# Patient Record
Sex: FEMALE | NOT HISPANIC OR LATINO | URBAN - METROPOLITAN AREA
[De-identification: names, ages, dates, MRNs, and addresses within clinical notes are randomized per-mention and may not be internally consistent; named-entity substitution may affect disease eponyms.]

---

## 2017-05-20 ENCOUNTER — INPATIENT (INPATIENT)
Facility: HOSPITAL | Age: 82
LOS: 14 days | Discharge: NURSING, REHAB OR LONG-TERM CARE | DRG: 871 | End: 2017-06-04
Attending: STUDENT IN AN ORGANIZED HEALTH CARE EDUCATION/TRAINING PROGRAM | Admitting: STUDENT IN AN ORGANIZED HEALTH CARE EDUCATION/TRAINING PROGRAM
Payer: MEDICARE

## 2017-05-20 VITALS
HEIGHT: 60 IN | TEMPERATURE: 98 F | SYSTOLIC BLOOD PRESSURE: 121 MMHG | OXYGEN SATURATION: 97 % | HEART RATE: 66 BPM | DIASTOLIC BLOOD PRESSURE: 57 MMHG | RESPIRATION RATE: 18 BRPM | WEIGHT: 111.99 LBS

## 2017-05-20 DIAGNOSIS — E78.00 PURE HYPERCHOLESTEROLEMIA, UNSPECIFIED: ICD-10-CM

## 2017-05-20 DIAGNOSIS — I10 ESSENTIAL (PRIMARY) HYPERTENSION: ICD-10-CM

## 2017-05-20 DIAGNOSIS — Z95.0 PRESENCE OF CARDIAC PACEMAKER: ICD-10-CM

## 2017-05-20 DIAGNOSIS — K85.90 ACUTE PANCREATITIS WITHOUT NECROSIS OR INFECTION, UNSPECIFIED: ICD-10-CM

## 2017-05-20 LAB
ALBUMIN SERPL ELPH-MCNC: 2.9 G/DL — LOW (ref 3.3–5.2)
ALBUMIN SERPL ELPH-MCNC: 3.8 G/DL — SIGNIFICANT CHANGE UP (ref 3.3–5.2)
ALP SERPL-CCNC: 144 U/L — HIGH (ref 40–120)
ALP SERPL-CCNC: 156 U/L — HIGH (ref 40–120)
ALT FLD-CCNC: 36 U/L — HIGH
ALT FLD-CCNC: 36 U/L — HIGH
ANION GAP SERPL CALC-SCNC: 15 MMOL/L — SIGNIFICANT CHANGE UP (ref 5–17)
ANION GAP SERPL CALC-SCNC: 17 MMOL/L — SIGNIFICANT CHANGE UP (ref 5–17)
APPEARANCE UR: ABNORMAL
APTT BLD: 35.5 SEC — SIGNIFICANT CHANGE UP (ref 27.5–37.4)
AST SERPL-CCNC: 110 U/L — HIGH
AST SERPL-CCNC: 113 U/L — HIGH
BACTERIA # UR AUTO: ABNORMAL
BASE EXCESS BLDV CALC-SCNC: -3 MMOL/L — SIGNIFICANT CHANGE UP (ref -3–3)
BASOPHILS # BLD AUTO: 0 K/UL — SIGNIFICANT CHANGE UP (ref 0–0.2)
BASOPHILS NFR BLD AUTO: 0.2 % — SIGNIFICANT CHANGE UP (ref 0–2)
BILIRUB DIRECT SERPL-MCNC: 3.3 MG/DL — HIGH (ref 0–0.3)
BILIRUB INDIRECT FLD-MCNC: 0.4 MG/DL — SIGNIFICANT CHANGE UP (ref 0.2–1)
BILIRUB SERPL-MCNC: 2.8 MG/DL — HIGH (ref 0.4–2)
BILIRUB SERPL-MCNC: 3.7 MG/DL — HIGH (ref 0.4–2)
BILIRUB UR-MCNC: NEGATIVE — SIGNIFICANT CHANGE UP
BUN SERPL-MCNC: 38 MG/DL — HIGH (ref 8–20)
BUN SERPL-MCNC: 41 MG/DL — HIGH (ref 8–20)
CALCIUM SERPL-MCNC: 7.7 MG/DL — LOW (ref 8.6–10.2)
CALCIUM SERPL-MCNC: 9.7 MG/DL — SIGNIFICANT CHANGE UP (ref 8.6–10.2)
CHLORIDE SERPL-SCNC: 100 MMOL/L — SIGNIFICANT CHANGE UP (ref 98–107)
CHLORIDE SERPL-SCNC: 102 MMOL/L — SIGNIFICANT CHANGE UP (ref 98–107)
CK SERPL-CCNC: 27 U/L — SIGNIFICANT CHANGE UP (ref 25–170)
CO2 SERPL-SCNC: 23 MMOL/L — SIGNIFICANT CHANGE UP (ref 22–29)
CO2 SERPL-SCNC: 27 MMOL/L — SIGNIFICANT CHANGE UP (ref 22–29)
COLOR SPEC: ABNORMAL
CREAT SERPL-MCNC: 1.09 MG/DL — SIGNIFICANT CHANGE UP (ref 0.5–1.3)
CREAT SERPL-MCNC: 1.47 MG/DL — HIGH (ref 0.5–1.3)
DIFF PNL FLD: ABNORMAL
EOSINOPHIL # BLD AUTO: 0.2 K/UL — SIGNIFICANT CHANGE UP (ref 0–0.5)
EOSINOPHIL NFR BLD AUTO: 1.2 % — SIGNIFICANT CHANGE UP (ref 0–6)
EPI CELLS # UR: SIGNIFICANT CHANGE UP
GAS PNL BLDA: SIGNIFICANT CHANGE UP
GAS PNL BLDV: SIGNIFICANT CHANGE UP
GLUCOSE SERPL-MCNC: 155 MG/DL — HIGH (ref 70–115)
GLUCOSE SERPL-MCNC: 72 MG/DL — SIGNIFICANT CHANGE UP (ref 70–115)
GLUCOSE UR QL: NEGATIVE MG/DL — SIGNIFICANT CHANGE UP
HCO3 BLDV-SCNC: 21 MMOL/L — SIGNIFICANT CHANGE UP (ref 20–26)
HCT VFR BLD CALC: 28 % — LOW (ref 37–47)
HCT VFR BLD CALC: 35.7 % — LOW (ref 37–47)
HGB BLD-MCNC: 11.9 G/DL — LOW (ref 12–16)
HGB BLD-MCNC: 9.4 G/DL — LOW (ref 12–16)
INR BLD: 1.41 RATIO — HIGH (ref 0.88–1.16)
KETONES UR-MCNC: NEGATIVE — SIGNIFICANT CHANGE UP
LACTATE BLDV-MCNC: 2.1 MMOL/L — HIGH (ref 0.7–2)
LACTATE BLDV-MCNC: 5.9 MMOL/L — CRITICAL HIGH (ref 0.7–2)
LEUKOCYTE ESTERASE UR-ACNC: ABNORMAL
LIDOCAIN IGE QN: >3000 U/L — HIGH (ref 22–51)
LYMPHOCYTES # BLD AUTO: 1.9 K/UL — SIGNIFICANT CHANGE UP (ref 1–4.8)
LYMPHOCYTES # BLD AUTO: 11.7 % — LOW (ref 20–55)
MAGNESIUM SERPL-MCNC: 1.6 MG/DL — SIGNIFICANT CHANGE UP (ref 1.6–2.6)
MCHC RBC-ENTMCNC: 33.1 PG — HIGH (ref 27–31)
MCHC RBC-ENTMCNC: 33.2 PG — HIGH (ref 27–31)
MCHC RBC-ENTMCNC: 33.3 G/DL — SIGNIFICANT CHANGE UP (ref 32–36)
MCHC RBC-ENTMCNC: 33.6 G/DL — SIGNIFICANT CHANGE UP (ref 32–36)
MCV RBC AUTO: 98.9 FL — SIGNIFICANT CHANGE UP (ref 81–99)
MCV RBC AUTO: 99.2 FL — HIGH (ref 81–99)
MONOCYTES # BLD AUTO: 0.4 K/UL — SIGNIFICANT CHANGE UP (ref 0–0.8)
MONOCYTES NFR BLD AUTO: 2.8 % — LOW (ref 3–10)
NEUTROPHILS # BLD AUTO: 13.7 K/UL — HIGH (ref 1.8–8)
NEUTROPHILS NFR BLD AUTO: 83.6 % — HIGH (ref 37–73)
NITRITE UR-MCNC: NEGATIVE — SIGNIFICANT CHANGE UP
PCO2 BLDV: 46 MMHG — SIGNIFICANT CHANGE UP (ref 35–50)
PH BLDV: 7.31 — LOW (ref 7.35–7.45)
PH UR: 7 — SIGNIFICANT CHANGE UP (ref 5–8)
PHOSPHATE SERPL-MCNC: 2.4 MG/DL — SIGNIFICANT CHANGE UP (ref 2.4–4.7)
PLATELET # BLD AUTO: 288 K/UL — SIGNIFICANT CHANGE UP (ref 150–400)
PLATELET # BLD AUTO: 437 K/UL — HIGH (ref 150–400)
PO2 BLDV: 36 MMHG — SIGNIFICANT CHANGE UP (ref 25–45)
POTASSIUM SERPL-MCNC: 4 MMOL/L — SIGNIFICANT CHANGE UP (ref 3.5–5.3)
POTASSIUM SERPL-MCNC: 4.6 MMOL/L — SIGNIFICANT CHANGE UP (ref 3.5–5.3)
POTASSIUM SERPL-SCNC: 4 MMOL/L — SIGNIFICANT CHANGE UP (ref 3.5–5.3)
POTASSIUM SERPL-SCNC: 4.6 MMOL/L — SIGNIFICANT CHANGE UP (ref 3.5–5.3)
PROT SERPL-MCNC: 5.4 G/DL — LOW (ref 6.6–8.7)
PROT SERPL-MCNC: 7.1 G/DL — SIGNIFICANT CHANGE UP (ref 6.6–8.7)
PROT UR-MCNC: 30 MG/DL
PROTHROM AB SERPL-ACNC: 15.6 SEC — HIGH (ref 9.8–12.7)
RBC # BLD: 2.83 M/UL — LOW (ref 4.4–5.2)
RBC # BLD: 3.6 M/UL — LOW (ref 4.4–5.2)
RBC # FLD: 16.3 % — HIGH (ref 11–15.6)
RBC # FLD: 16.5 % — HIGH (ref 11–15.6)
SAO2 % BLDV: 64 % — LOW (ref 67–88)
SODIUM SERPL-SCNC: 140 MMOL/L — SIGNIFICANT CHANGE UP (ref 135–145)
SODIUM SERPL-SCNC: 144 MMOL/L — SIGNIFICANT CHANGE UP (ref 135–145)
SP GR SPEC: 1 — LOW (ref 1.01–1.02)
TROPONIN T SERPL-MCNC: 0.03 NG/ML — SIGNIFICANT CHANGE UP (ref 0–0.06)
UROBILINOGEN FLD QL: 12 MG/DL
WBC # BLD: 16.3 K/UL — HIGH (ref 4.8–10.8)
WBC # BLD: 39.5 K/UL — HIGH (ref 4.8–10.8)
WBC # FLD AUTO: 16.3 K/UL — HIGH (ref 4.8–10.8)
WBC # FLD AUTO: 39.5 K/UL — HIGH (ref 4.8–10.8)
WBC UR QL: ABNORMAL

## 2017-05-20 PROCEDURE — 36556 INSERT NON-TUNNEL CV CATH: CPT

## 2017-05-20 PROCEDURE — 71010: CPT | Mod: 26

## 2017-05-20 PROCEDURE — 74177 CT ABD & PELVIS W/CONTRAST: CPT | Mod: 26

## 2017-05-20 PROCEDURE — 76937 US GUIDE VASCULAR ACCESS: CPT | Mod: 26

## 2017-05-20 PROCEDURE — 99291 CRITICAL CARE FIRST HOUR: CPT

## 2017-05-20 PROCEDURE — 70450 CT HEAD/BRAIN W/O DYE: CPT | Mod: 26

## 2017-05-20 PROCEDURE — 93010 ELECTROCARDIOGRAM REPORT: CPT

## 2017-05-20 PROCEDURE — 36620 INSERTION CATHETER ARTERY: CPT

## 2017-05-20 PROCEDURE — 72125 CT NECK SPINE W/O DYE: CPT | Mod: 26

## 2017-05-20 PROCEDURE — 76705 ECHO EXAM OF ABDOMEN: CPT | Mod: 26

## 2017-05-20 RX ORDER — MAGNESIUM SULFATE 500 MG/ML
2 VIAL (ML) INJECTION ONCE
Qty: 0 | Refills: 0 | Status: COMPLETED | OUTPATIENT
Start: 2017-05-20 | End: 2017-05-20

## 2017-05-20 RX ORDER — SODIUM CHLORIDE 9 MG/ML
1000 INJECTION, SOLUTION INTRAVENOUS ONCE
Qty: 0 | Refills: 0 | Status: COMPLETED | OUTPATIENT
Start: 2017-05-20 | End: 2017-05-20

## 2017-05-20 RX ORDER — SODIUM CHLORIDE 9 MG/ML
3 INJECTION INTRAMUSCULAR; INTRAVENOUS; SUBCUTANEOUS ONCE
Qty: 0 | Refills: 0 | Status: COMPLETED | OUTPATIENT
Start: 2017-05-20 | End: 2017-05-20

## 2017-05-20 RX ORDER — MORPHINE SULFATE 50 MG/1
2 CAPSULE, EXTENDED RELEASE ORAL ONCE
Qty: 0 | Refills: 0 | Status: DISCONTINUED | OUTPATIENT
Start: 2017-05-20 | End: 2017-05-20

## 2017-05-20 RX ORDER — ERTAPENEM SODIUM 1 G/1
500 INJECTION, POWDER, LYOPHILIZED, FOR SOLUTION INTRAMUSCULAR; INTRAVENOUS ONCE
Qty: 0 | Refills: 0 | Status: COMPLETED | OUTPATIENT
Start: 2017-05-20 | End: 2017-05-20

## 2017-05-20 RX ORDER — NOREPINEPHRINE BITARTRATE/D5W 8 MG/250ML
0.05 PLASTIC BAG, INJECTION (ML) INTRAVENOUS
Qty: 8 | Refills: 0 | Status: DISCONTINUED | OUTPATIENT
Start: 2017-05-20 | End: 2017-05-21

## 2017-05-20 RX ORDER — SODIUM CHLORIDE 9 MG/ML
250 INJECTION INTRAMUSCULAR; INTRAVENOUS; SUBCUTANEOUS ONCE
Qty: 0 | Refills: 0 | Status: COMPLETED | OUTPATIENT
Start: 2017-05-20 | End: 2017-05-20

## 2017-05-20 RX ORDER — HYDROMORPHONE HYDROCHLORIDE 2 MG/ML
0.5 INJECTION INTRAMUSCULAR; INTRAVENOUS; SUBCUTANEOUS EVERY 4 HOURS
Qty: 0 | Refills: 0 | Status: DISCONTINUED | OUTPATIENT
Start: 2017-05-20 | End: 2017-05-25

## 2017-05-20 RX ORDER — ACETAMINOPHEN 500 MG
1000 TABLET ORAL EVERY 8 HOURS
Qty: 0 | Refills: 0 | Status: COMPLETED | OUTPATIENT
Start: 2017-05-20 | End: 2017-05-20

## 2017-05-20 RX ORDER — SODIUM CHLORIDE 9 MG/ML
500 INJECTION, SOLUTION INTRAVENOUS ONCE
Qty: 0 | Refills: 0 | Status: COMPLETED | OUTPATIENT
Start: 2017-05-20 | End: 2017-05-20

## 2017-05-20 RX ORDER — VASOPRESSIN 20 [USP'U]/ML
0.04 INJECTION INTRAVENOUS
Qty: 100 | Refills: 0 | Status: DISCONTINUED | OUTPATIENT
Start: 2017-05-20 | End: 2017-05-21

## 2017-05-20 RX ORDER — ERTAPENEM SODIUM 1 G/1
INJECTION, POWDER, LYOPHILIZED, FOR SOLUTION INTRAMUSCULAR; INTRAVENOUS
Qty: 0 | Refills: 0 | Status: DISCONTINUED | OUTPATIENT
Start: 2017-05-20 | End: 2017-06-03

## 2017-05-20 RX ORDER — ERTAPENEM SODIUM 1 G/1
500 INJECTION, POWDER, LYOPHILIZED, FOR SOLUTION INTRAMUSCULAR; INTRAVENOUS EVERY 24 HOURS
Qty: 0 | Refills: 0 | Status: DISCONTINUED | OUTPATIENT
Start: 2017-05-21 | End: 2017-06-03

## 2017-05-20 RX ORDER — ONDANSETRON 8 MG/1
4 TABLET, FILM COATED ORAL ONCE
Qty: 0 | Refills: 0 | Status: COMPLETED | OUTPATIENT
Start: 2017-05-20 | End: 2017-05-20

## 2017-05-20 RX ORDER — CIPROFLOXACIN LACTATE 400MG/40ML
400 VIAL (ML) INTRAVENOUS ONCE
Qty: 0 | Refills: 0 | Status: DISCONTINUED | OUTPATIENT
Start: 2017-05-20 | End: 2017-05-20

## 2017-05-20 RX ORDER — ERTAPENEM SODIUM 1 G/1
INJECTION, POWDER, LYOPHILIZED, FOR SOLUTION INTRAMUSCULAR; INTRAVENOUS
Qty: 0 | Refills: 0 | Status: DISCONTINUED | OUTPATIENT
Start: 2017-05-20 | End: 2017-05-20

## 2017-05-20 RX ORDER — SODIUM CHLORIDE 9 MG/ML
1000 INJECTION, SOLUTION INTRAVENOUS
Qty: 0 | Refills: 0 | Status: DISCONTINUED | OUTPATIENT
Start: 2017-05-20 | End: 2017-05-22

## 2017-05-20 RX ORDER — HEPARIN SODIUM 5000 [USP'U]/ML
5000 INJECTION INTRAVENOUS; SUBCUTANEOUS EVERY 8 HOURS
Qty: 0 | Refills: 0 | Status: DISCONTINUED | OUTPATIENT
Start: 2017-05-20 | End: 2017-06-04

## 2017-05-20 RX ORDER — METRONIDAZOLE 500 MG
500 TABLET ORAL ONCE
Qty: 0 | Refills: 0 | Status: DISCONTINUED | OUTPATIENT
Start: 2017-05-20 | End: 2017-05-20

## 2017-05-20 RX ADMIN — SODIUM CHLORIDE 3000 MILLILITER(S): 9 INJECTION, SOLUTION INTRAVENOUS at 19:28

## 2017-05-20 RX ADMIN — SODIUM CHLORIDE 250 MILLILITER(S): 9 INJECTION INTRAMUSCULAR; INTRAVENOUS; SUBCUTANEOUS at 07:14

## 2017-05-20 RX ADMIN — SODIUM CHLORIDE 3 MILLILITER(S): 9 INJECTION INTRAMUSCULAR; INTRAVENOUS; SUBCUTANEOUS at 07:04

## 2017-05-20 RX ADMIN — Medication 50 GRAM(S): at 20:20

## 2017-05-20 RX ADMIN — Medication 4.76 MICROGRAM(S)/KG/MIN: at 17:17

## 2017-05-20 RX ADMIN — HEPARIN SODIUM 5000 UNIT(S): 5000 INJECTION INTRAVENOUS; SUBCUTANEOUS at 14:58

## 2017-05-20 RX ADMIN — SODIUM CHLORIDE 1000 MILLILITER(S): 9 INJECTION, SOLUTION INTRAVENOUS at 11:00

## 2017-05-20 RX ADMIN — Medication 400 MILLIGRAM(S): at 21:00

## 2017-05-20 RX ADMIN — HEPARIN SODIUM 5000 UNIT(S): 5000 INJECTION INTRAVENOUS; SUBCUTANEOUS at 22:09

## 2017-05-20 RX ADMIN — SODIUM CHLORIDE 75 MILLILITER(S): 9 INJECTION, SOLUTION INTRAVENOUS at 14:49

## 2017-05-20 RX ADMIN — MORPHINE SULFATE 2 MILLIGRAM(S): 50 CAPSULE, EXTENDED RELEASE ORAL at 07:14

## 2017-05-20 RX ADMIN — ERTAPENEM SODIUM 100 MILLIGRAM(S): 1 INJECTION, POWDER, LYOPHILIZED, FOR SOLUTION INTRAMUSCULAR; INTRAVENOUS at 18:52

## 2017-05-20 RX ADMIN — SODIUM CHLORIDE 3000 MILLILITER(S): 9 INJECTION, SOLUTION INTRAVENOUS at 20:35

## 2017-05-20 RX ADMIN — ONDANSETRON 4 MILLIGRAM(S): 8 TABLET, FILM COATED ORAL at 07:05

## 2017-05-20 RX ADMIN — Medication 50 GRAM(S): at 17:17

## 2017-05-20 RX ADMIN — Medication 100 MILLIGRAM(S): at 22:08

## 2017-05-20 RX ADMIN — SODIUM CHLORIDE 500 MILLILITER(S): 9 INJECTION, SOLUTION INTRAVENOUS at 17:13

## 2017-05-20 RX ADMIN — MORPHINE SULFATE 2 MILLIGRAM(S): 50 CAPSULE, EXTENDED RELEASE ORAL at 07:29

## 2017-05-20 RX ADMIN — Medication 1000 MILLIGRAM(S): at 21:30

## 2017-05-20 RX ADMIN — VASOPRESSIN 2.4 UNIT(S)/MIN: 20 INJECTION INTRAVENOUS at 21:40

## 2017-05-20 NOTE — ED PROVIDER NOTE - MEDICAL DECISION MAKING DETAILS
abdominal pain after fall unclear when started will tx for poss infection and w/u and surgery called for consult

## 2017-05-20 NOTE — ED PROVIDER NOTE - PROGRESS NOTE DETAILS
charge nurse Magdalena birmingham aware to assign RN to pt Lipase over 3000 surgery called and aware and rn aware o put pt on monitor charge nurse Magdalena birmingham aware to assign RN to pt surgery in ed at 745 and examined pt will follow and reeval post tests Lipase over 3000 surgery called and aware and rn aware on put pt on monitor and give contrast Icu Yifan Figueroa called and case discussedna [facility] see ct and surg eval and if they don't take pt she will eval for icu Dr Garcia in ed and will admit to sicu and direct further care Dr Garcia in ed and will admit to Sicu and direct further care

## 2017-05-20 NOTE — H&P ADULT - HISTORY OF PRESENT ILLNESS
Pt presents to [facility] ER s/p fall at home.  She was unable to get up and utilized her life alert. Daughter came to the patient's aid and brought her to [facility]. She admits to a history of abdominal pain but cannot recall when it began. Offers no other complaints.

## 2017-05-20 NOTE — H&P ADULT - ATTENDING COMMENTS
admit to sicu.   needs central venous access , and arterial line  will need vasopressor support  start Invanz  needs blood cx, urine cx. and will need bilary decompression

## 2017-05-20 NOTE — ED PROVIDER NOTE - CARE PLAN
Principal Discharge DX:	Abdominal pain Principal Discharge DX:	Pancreatitis  Secondary Diagnosis:	Abdominal pain

## 2017-05-20 NOTE — CONSULT NOTE ADULT - ASSESSMENT
89 year old female s/p mechanical fall from standing while using walker, no LOC, no head trauma, complaining of back pain, no external signs of trauma, no evidence of acute abdomen

## 2017-05-20 NOTE — PROCEDURE NOTE - NSPROCDETAILS_GEN_ALL_CORE
sterile dressing applied/lumen(s) aspirated and flushed/sterile technique, catheter placed/guidewire recovered

## 2017-05-20 NOTE — ED ADULT TRIAGE NOTE - CHIEF COMPLAINT QUOTE
pt biba after falling on grass in yard this AM, found sitting by daughter. pt denies injury, denies hitting head. on ASA 81mg. c/o abd pains at present time.

## 2017-05-20 NOTE — CONSULT NOTE ADULT - PROBLEM SELECTOR RECOMMENDATION 9
-no acute surgical intervention indicated at this time  -surgery team will follow up labs, imaging, and any further workup  -call ACS with questions or concerns  -full note to follow - no trauma injuries identified on exam or imaging. subsequent ACS issues noted, biliary - pancreatitis found will admit to sicu

## 2017-05-20 NOTE — CONSULT NOTE ADULT - SUBJECTIVE AND OBJECTIVE BOX
HPI: Patient is a 89 year old female, found down by her daughter at 515am, after pt went outside for her morning walk, pt used her life alert system. Pt found sitting up, in grass. Pt states she remembers the entire event, and did not hit her head. Pt states her walker malfunctioned and she fell onto the grass. Pt complaining of back pain and chills. Pt denies abdominal pain, nausea, vomiting, fever, chest pain or shortness of breath.    PMH:  CVA (cerebral infarction)    High cholesterol    Hypertension    Pacemaker    PSH:  Pacemaker insertion    SH: denies toxic habits, lives with daughter, uses walker    Meds:  Vascepa 2g BID  Synthroid 125mcg daily  Toprol XL 50mg BID  Fenofibrate 200mg daily  Vitamin D  sertraline 50mg daily  bumetanide 1mg daily  dexilant 60mg daily  Mag ox 500mg daily    Allergies: penicillin    Primary survey intact      Vital Signs Last 24 Hrs  T(C): 36.8, Max: 36.8 (05-20 @ 06:20)  T(F): 98.2, Max: 98.2 (05-20 @ 06:20)  HR: 66 (66 - 66)  BP: 121/57 (121/57 - 121/57)  BP(mean): --  RR: 18 (18 - 18)  SpO2: 97% (97% - 97%)    PE  Gen: NAD, shivering   HEENT: EOMI, head atruamatic  Neck: no cervical tenderness  Chest: no chest wall tenderness, no crepitus  Pulm: CTAB  CV: RRR  Abd: soft, nontender, nondistended  Back: no step off, no spinal tenderness  : no blood in perineum  Ext: moving all extremities  Vasc: 2+ peripheral pulses  Neuro: GCS 15, nonfocal      I&O's Detail      LABS:      RADIOLOGY & ADDITIONAL STUDIES:

## 2017-05-20 NOTE — H&P ADULT - PROBLEM SELECTOR PLAN 1
Admit to ACS service, SICU  Triglycerides and RUQ US pending  Scott for strict UO  Baseline ABG and EKG ordered  Fluid bolus ordered, will repeat lactate 4 hours after result

## 2017-05-20 NOTE — H&P ADULT - NSHPPHYSICALEXAM_GEN_ALL_CORE
General: NAD, resting comfortably  HEENT: PERRL, EOMI, no evidence of trauma  Neck: No JVD, trachea midline, FROM without pain  Chest: NTTP, CTA b/l  Abd: Soft, tender to deep palpation in the upper abdomen, non-distended, no skin changes  Extremities: No point tenderness, FROM without pain  Neuro: without gross deficit

## 2017-05-20 NOTE — ED ADULT NURSE REASSESSMENT NOTE - NS ED NURSE REASSESS COMMENT FT1
pt much more awake and alert speaking with family iv infusing well no redness or swelling present. ridley cath patent and draining a small amount of dark marcelo urine. pt denies any pain at this time.

## 2017-05-20 NOTE — PATIENT PROFILE ADULT. - ABILITY TO HEAR (WITH HEARING AID OR HEARING APPLIANCE IF NORMALLY USED):
Mildly to Moderately Impaired: difficulty hearing in some environments or speaker may need to increase volume or speak distinctly/has hearing aids in left ear

## 2017-05-20 NOTE — ED ADULT NURSE NOTE - OBJECTIVE STATEMENT
found outside this am on ground, sitting, patient complaining of back abdominal pain, nausea denies vomiting unknown LOC or hitting head

## 2017-05-20 NOTE — H&P ADULT - ASSESSMENT
89F with acute on chronic pancreatitis, likely biliary in nature 89F with acute on pancreatitis, likely biliary in nature

## 2017-05-20 NOTE — ED PROVIDER NOTE - CRITICAL CARE PROVIDED
direct patient care (not related to procedure)/documentation/consultation with other physicians/interpretation of diagnostic studies

## 2017-05-20 NOTE — H&P ADULT - NSHPLABSRESULTS_GEN_ALL_CORE
11.9   16.3  )-----------( 437      ( 20 May 2017 07:30 )             35.7   05-20    144  |  102  |  41.0<H>  ----------------------------<  155<H>  4.6   |  27.0  |  1.09    Ca    9.7      20 May 2017 07:30    TPro  7.1  /  Alb  3.8  /  TBili  2.8<H>  /  DBili  x   /  AST  113<H>  /  ALT  36<H>  /  AlkPhos  156<H>  05-20    Lactate: 5.9    CT Abd/pel: Inflammation of the tail of the pancreatitis, changes consistent with chronic pancreatitis. +cholelithiasis

## 2017-05-20 NOTE — ED PROVIDER NOTE - CONSTITUTIONAL, MLM
normal... ILL and anxiuos appearing, well nourished, awake, alert, oriented to person,and place very anxious.

## 2017-05-20 NOTE — ED PROVIDER NOTE - OBJECTIVE STATEMENT
89 y/o female with a h/o cva htn and parkinsions states she and her daughter state she got up at about 430-5am she is usually very unstedy and went out in her back yard and fell and pushed a life alert and daughter came and found her sitting up on grass and pt c/o abdominal pain pt says she cant remember when it started whether it was before or after she fell and denies pain in hips or head or neck or chest 89 y/o female with a h/o cva htn and parkinsions states she and her daughter state she got up at about 430-5am she is usually very unsteady and went out in her back yard and fell and pushed a life alert and daughter came and found her sitting up on grass and pt c/o abdominal pain pt says she cant remember when it started whether it was before or after she fell and denies pain in hips or head or neck or chest 89 y/o female with a h/o cva htn and parkinson's states she and her daughter state she got up at about 430-5am she is usually very unsteady and went out in her back yard and fell and pushed a life alert and daughter came and found her sitting up on grass and pt c/o abdominal pain pt says she cant remember when it started whether it was before or after she fell and denies pain in hips or head or neck or chest

## 2017-05-21 DIAGNOSIS — K85.10 BILIARY ACUTE PANCREATITIS WITHOUT NECROSIS OR INFECTION: ICD-10-CM

## 2017-05-21 DIAGNOSIS — K80.31 CALCULUS OF BILE DUCT WITH CHOLANGITIS, UNSPECIFIED, WITH OBSTRUCTION: ICD-10-CM

## 2017-05-21 DIAGNOSIS — N17.9 ACUTE KIDNEY FAILURE, UNSPECIFIED: ICD-10-CM

## 2017-05-21 DIAGNOSIS — A41.9 SEPSIS, UNSPECIFIED ORGANISM: ICD-10-CM

## 2017-05-21 LAB
-  AMIKACIN: SIGNIFICANT CHANGE UP
-  AMIKACIN: SIGNIFICANT CHANGE UP
-  AMPICILLIN/SULBACTAM: SIGNIFICANT CHANGE UP
-  AMPICILLIN/SULBACTAM: SIGNIFICANT CHANGE UP
-  AMPICILLIN: SIGNIFICANT CHANGE UP
-  AMPICILLIN: SIGNIFICANT CHANGE UP
-  AZTREONAM: SIGNIFICANT CHANGE UP
-  AZTREONAM: SIGNIFICANT CHANGE UP
-  CEFAZOLIN: SIGNIFICANT CHANGE UP
-  CEFAZOLIN: SIGNIFICANT CHANGE UP
-  CEFEPIME: SIGNIFICANT CHANGE UP
-  CEFEPIME: SIGNIFICANT CHANGE UP
-  CEFOXITIN: SIGNIFICANT CHANGE UP
-  CEFOXITIN: SIGNIFICANT CHANGE UP
-  CEFTAZIDIME: SIGNIFICANT CHANGE UP
-  CEFTAZIDIME: SIGNIFICANT CHANGE UP
-  CEFTRIAXONE: SIGNIFICANT CHANGE UP
-  CEFTRIAXONE: SIGNIFICANT CHANGE UP
-  CIPROFLOXACIN: SIGNIFICANT CHANGE UP
-  CIPROFLOXACIN: SIGNIFICANT CHANGE UP
-  ERTAPENEM: SIGNIFICANT CHANGE UP
-  ERTAPENEM: SIGNIFICANT CHANGE UP
-  GENTAMICIN: SIGNIFICANT CHANGE UP
-  GENTAMICIN: SIGNIFICANT CHANGE UP
-  IMIPENEM: SIGNIFICANT CHANGE UP
-  IMIPENEM: SIGNIFICANT CHANGE UP
-  LEVOFLOXACIN: SIGNIFICANT CHANGE UP
-  LEVOFLOXACIN: SIGNIFICANT CHANGE UP
-  MEROPENEM: SIGNIFICANT CHANGE UP
-  MEROPENEM: SIGNIFICANT CHANGE UP
-  PIPERACILLIN/TAZOBACTAM: SIGNIFICANT CHANGE UP
-  PIPERACILLIN/TAZOBACTAM: SIGNIFICANT CHANGE UP
-  TOBRAMYCIN: SIGNIFICANT CHANGE UP
-  TOBRAMYCIN: SIGNIFICANT CHANGE UP
-  TRIMETHOPRIM/SULFAMETHOXAZOLE: SIGNIFICANT CHANGE UP
-  TRIMETHOPRIM/SULFAMETHOXAZOLE: SIGNIFICANT CHANGE UP
ALBUMIN SERPL ELPH-MCNC: 3.1 G/DL — LOW (ref 3.3–5.2)
ALP SERPL-CCNC: 160 U/L — HIGH (ref 40–120)
ALT FLD-CCNC: 63 U/L — HIGH
ANION GAP SERPL CALC-SCNC: 19 MMOL/L — HIGH (ref 5–17)
ANION GAP SERPL CALC-SCNC: 21 MMOL/L — HIGH (ref 5–17)
ANISOCYTOSIS BLD QL: SIGNIFICANT CHANGE UP
AST SERPL-CCNC: 155 U/L — HIGH
BASE EXCESS BLDV CALC-SCNC: -3.8 MMOL/L — LOW (ref -3–3)
BASOPHILS # BLD AUTO: 0 K/UL — SIGNIFICANT CHANGE UP (ref 0–0.2)
BASOPHILS NFR BLD AUTO: 0.1 % — SIGNIFICANT CHANGE UP (ref 0–2)
BILIRUB DIRECT SERPL-MCNC: 4.7 MG/DL — HIGH (ref 0–0.3)
BILIRUB INDIRECT FLD-MCNC: 0.4 MG/DL — SIGNIFICANT CHANGE UP (ref 0.2–1)
BILIRUB SERPL-MCNC: 5.1 MG/DL — HIGH (ref 0.4–2)
BLOOD GAS COMMENTS, VENOUS: SIGNIFICANT CHANGE UP
BUN SERPL-MCNC: 35 MG/DL — HIGH (ref 8–20)
BUN SERPL-MCNC: 36 MG/DL — HIGH (ref 8–20)
CALCIUM SERPL-MCNC: 7.1 MG/DL — LOW (ref 8.6–10.2)
CALCIUM SERPL-MCNC: 7.4 MG/DL — LOW (ref 8.6–10.2)
CHLORIDE SERPL-SCNC: 97 MMOL/L — LOW (ref 98–107)
CHLORIDE SERPL-SCNC: 98 MMOL/L — SIGNIFICANT CHANGE UP (ref 98–107)
CO2 SERPL-SCNC: 21 MMOL/L — LOW (ref 22–29)
CO2 SERPL-SCNC: 24 MMOL/L — SIGNIFICANT CHANGE UP (ref 22–29)
CREAT SERPL-MCNC: 1.3 MG/DL — SIGNIFICANT CHANGE UP (ref 0.5–1.3)
CREAT SERPL-MCNC: 1.34 MG/DL — HIGH (ref 0.5–1.3)
CULTURE RESULTS: SIGNIFICANT CHANGE UP
CULTURE RESULTS: SIGNIFICANT CHANGE UP
EOSINOPHIL # BLD AUTO: 0 K/UL — SIGNIFICANT CHANGE UP (ref 0–0.5)
EOSINOPHIL NFR BLD AUTO: 1 % — SIGNIFICANT CHANGE UP (ref 0–5)
GAS PNL BLDA: SIGNIFICANT CHANGE UP
GAS PNL BLDA: SIGNIFICANT CHANGE UP
GAS PNL BLDV: SIGNIFICANT CHANGE UP
GLUCOSE SERPL-MCNC: 109 MG/DL — SIGNIFICANT CHANGE UP (ref 70–115)
GLUCOSE SERPL-MCNC: 128 MG/DL — HIGH (ref 70–115)
HCO3 BLDV-SCNC: 21 MMOL/L — SIGNIFICANT CHANGE UP (ref 20–26)
HCT VFR BLD CALC: 27.5 % — LOW (ref 37–47)
HCT VFR BLD CALC: 29 % — LOW (ref 37–47)
HGB BLD-MCNC: 9.4 G/DL — LOW (ref 12–16)
HGB BLD-MCNC: 9.7 G/DL — LOW (ref 12–16)
HOROWITZ INDEX BLDV+IHG-RTO: SIGNIFICANT CHANGE UP
LIDOCAIN IGE QN: 543 U/L — HIGH (ref 22–51)
LYMPHOCYTES # BLD AUTO: 0.9 K/UL — LOW (ref 1–4.8)
LYMPHOCYTES # BLD AUTO: 1.9 % — LOW (ref 20–55)
MACROCYTES BLD QL: SIGNIFICANT CHANGE UP
MAGNESIUM SERPL-MCNC: 3.6 MG/DL — HIGH (ref 1.6–2.6)
MAGNESIUM SERPL-MCNC: 3.7 MG/DL — HIGH (ref 1.6–2.6)
MCHC RBC-ENTMCNC: 32.9 PG — HIGH (ref 27–31)
MCHC RBC-ENTMCNC: 33.3 PG — HIGH (ref 27–31)
MCHC RBC-ENTMCNC: 33.4 G/DL — SIGNIFICANT CHANGE UP (ref 32–36)
MCHC RBC-ENTMCNC: 34.2 G/DL — SIGNIFICANT CHANGE UP (ref 32–36)
MCV RBC AUTO: 97.5 FL — SIGNIFICANT CHANGE UP (ref 81–99)
MCV RBC AUTO: 98.3 FL — SIGNIFICANT CHANGE UP (ref 81–99)
METHOD TYPE: SIGNIFICANT CHANGE UP
METHOD TYPE: SIGNIFICANT CHANGE UP
MONOCYTES # BLD AUTO: 3.1 K/UL — HIGH (ref 0–0.8)
MONOCYTES NFR BLD AUTO: 3 % — SIGNIFICANT CHANGE UP (ref 3–10)
MONOCYTES NFR BLD AUTO: 6.6 % — SIGNIFICANT CHANGE UP (ref 3–10)
NEUTROPHILS # BLD AUTO: 41.9 K/UL — HIGH (ref 1.8–8)
NEUTROPHILS NFR BLD AUTO: 89 % — HIGH (ref 37–73)
NEUTROPHILS NFR BLD AUTO: 89.9 % — HIGH (ref 37–73)
NEUTS BAND # BLD: 7 % — SIGNIFICANT CHANGE UP (ref 0–8)
ORGANISM # SPEC MICROSCOPIC CNT: SIGNIFICANT CHANGE UP
PCO2 BLDV: 52 MMHG — HIGH (ref 35–50)
PH BLDV: 7.26 — LOW (ref 7.35–7.45)
PHOSPHATE SERPL-MCNC: 5.2 MG/DL — HIGH (ref 2.4–4.7)
PLAT MORPH BLD: NORMAL — SIGNIFICANT CHANGE UP
PLATELET # BLD AUTO: 310 K/UL — SIGNIFICANT CHANGE UP (ref 150–400)
PLATELET # BLD AUTO: 349 K/UL — SIGNIFICANT CHANGE UP (ref 150–400)
PO2 BLDV: 38 MMHG — SIGNIFICANT CHANGE UP (ref 25–45)
POTASSIUM SERPL-MCNC: 3.7 MMOL/L — SIGNIFICANT CHANGE UP (ref 3.5–5.3)
POTASSIUM SERPL-MCNC: 3.9 MMOL/L — SIGNIFICANT CHANGE UP (ref 3.5–5.3)
POTASSIUM SERPL-SCNC: 3.7 MMOL/L — SIGNIFICANT CHANGE UP (ref 3.5–5.3)
POTASSIUM SERPL-SCNC: 3.9 MMOL/L — SIGNIFICANT CHANGE UP (ref 3.5–5.3)
PROT SERPL-MCNC: 5.9 G/DL — LOW (ref 6.6–8.7)
RBC # BLD: 2.82 M/UL — LOW (ref 4.4–5.2)
RBC # BLD: 2.95 M/UL — LOW (ref 4.4–5.2)
RBC # FLD: 16.7 % — HIGH (ref 11–15.6)
RBC # FLD: 17 % — HIGH (ref 11–15.6)
RBC BLD AUTO: ABNORMAL
SAO2 % BLDV: 62 % — LOW (ref 67–88)
SODIUM SERPL-SCNC: 140 MMOL/L — SIGNIFICANT CHANGE UP (ref 135–145)
SODIUM SERPL-SCNC: 140 MMOL/L — SIGNIFICANT CHANGE UP (ref 135–145)
SPECIMEN SOURCE: SIGNIFICANT CHANGE UP
SPECIMEN SOURCE: SIGNIFICANT CHANGE UP
TOXIC GRANULES BLD QL SMEAR: PRESENT — SIGNIFICANT CHANGE UP
WBC # BLD: 46.6 K/UL — CRITICAL HIGH (ref 4.8–10.8)
WBC # BLD: 46.7 K/UL — CRITICAL HIGH (ref 4.8–10.8)
WBC # FLD AUTO: 46.6 K/UL — CRITICAL HIGH (ref 4.8–10.8)
WBC # FLD AUTO: 46.7 K/UL — CRITICAL HIGH (ref 4.8–10.8)

## 2017-05-21 PROCEDURE — 99233 SBSQ HOSP IP/OBS HIGH 50: CPT

## 2017-05-21 PROCEDURE — 71010: CPT | Mod: 26

## 2017-05-21 RX ORDER — DEXTROSE 50 % IN WATER 50 %
25 SYRINGE (ML) INTRAVENOUS ONCE
Qty: 0 | Refills: 0 | Status: COMPLETED | OUTPATIENT
Start: 2017-05-21 | End: 2017-05-21

## 2017-05-21 RX ADMIN — HEPARIN SODIUM 5000 UNIT(S): 5000 INJECTION INTRAVENOUS; SUBCUTANEOUS at 22:30

## 2017-05-21 RX ADMIN — SODIUM CHLORIDE 100 MILLILITER(S): 9 INJECTION, SOLUTION INTRAVENOUS at 23:41

## 2017-05-21 RX ADMIN — HEPARIN SODIUM 5000 UNIT(S): 5000 INJECTION INTRAVENOUS; SUBCUTANEOUS at 15:10

## 2017-05-21 RX ADMIN — SODIUM CHLORIDE 100 MILLILITER(S): 9 INJECTION, SOLUTION INTRAVENOUS at 20:30

## 2017-05-21 RX ADMIN — ERTAPENEM SODIUM 100 MILLIGRAM(S): 1 INJECTION, POWDER, LYOPHILIZED, FOR SOLUTION INTRAMUSCULAR; INTRAVENOUS at 17:48

## 2017-05-21 RX ADMIN — Medication 50 MILLIGRAM(S): at 12:28

## 2017-05-21 RX ADMIN — Medication 50 MILLIGRAM(S): at 17:48

## 2017-05-21 RX ADMIN — SODIUM CHLORIDE 100 MILLILITER(S): 9 INJECTION, SOLUTION INTRAVENOUS at 02:00

## 2017-05-21 RX ADMIN — Medication 50 MILLIGRAM(S): at 23:41

## 2017-05-21 RX ADMIN — Medication 25 MILLILITER(S): at 01:00

## 2017-05-21 RX ADMIN — Medication 50 MILLIGRAM(S): at 06:07

## 2017-05-21 RX ADMIN — HEPARIN SODIUM 5000 UNIT(S): 5000 INJECTION INTRAVENOUS; SUBCUTANEOUS at 06:08

## 2017-05-22 DIAGNOSIS — R53.81 OTHER MALAISE: ICD-10-CM

## 2017-05-22 DIAGNOSIS — R78.81 BACTEREMIA: ICD-10-CM

## 2017-05-22 DIAGNOSIS — K85.10 BILIARY ACUTE PANCREATITIS WITHOUT NECROSIS OR INFECTION: ICD-10-CM

## 2017-05-22 DIAGNOSIS — Z51.5 ENCOUNTER FOR PALLIATIVE CARE: ICD-10-CM

## 2017-05-22 PROCEDURE — 99223 1ST HOSP IP/OBS HIGH 75: CPT

## 2017-05-22 PROCEDURE — 71010: CPT | Mod: 26

## 2017-05-22 RX ORDER — FUROSEMIDE 40 MG
20 TABLET ORAL ONCE
Qty: 0 | Refills: 0 | Status: COMPLETED | OUTPATIENT
Start: 2017-05-22 | End: 2017-05-22

## 2017-05-22 RX ADMIN — Medication 50 MILLIGRAM(S): at 22:15

## 2017-05-22 RX ADMIN — HEPARIN SODIUM 5000 UNIT(S): 5000 INJECTION INTRAVENOUS; SUBCUTANEOUS at 13:11

## 2017-05-22 RX ADMIN — Medication 50 MILLIGRAM(S): at 11:36

## 2017-05-22 RX ADMIN — SODIUM CHLORIDE 100 MILLILITER(S): 9 INJECTION, SOLUTION INTRAVENOUS at 11:36

## 2017-05-22 RX ADMIN — Medication 50 MILLIGRAM(S): at 05:17

## 2017-05-22 RX ADMIN — HEPARIN SODIUM 5000 UNIT(S): 5000 INJECTION INTRAVENOUS; SUBCUTANEOUS at 05:17

## 2017-05-22 RX ADMIN — Medication 50 MILLIGRAM(S): at 17:19

## 2017-05-22 RX ADMIN — Medication 20 MILLIGRAM(S): at 19:16

## 2017-05-22 RX ADMIN — HEPARIN SODIUM 5000 UNIT(S): 5000 INJECTION INTRAVENOUS; SUBCUTANEOUS at 22:14

## 2017-05-22 RX ADMIN — ERTAPENEM SODIUM 100 MILLIGRAM(S): 1 INJECTION, POWDER, LYOPHILIZED, FOR SOLUTION INTRAMUSCULAR; INTRAVENOUS at 17:20

## 2017-05-22 NOTE — CONSULT NOTE ADULT - SUBJECTIVE AND OBJECTIVE BOX
HPI:    PERTINENT PMH REVIEWED: Yes No    PAST MEDICAL & SURGICAL HISTORY:  Pacemaker  High cholesterol  CVA (cerebral infarction)  Hypertension  No significant past surgical history      SOCIAL HISTORY:                                     Admitted from:  home  SNF  [facility]     Surrogate/HCP/Guardian: Phone#:    FAMILY HISTORY:      Baseline ADLs (prior to admission):  Independent/ Dependent      Allergies    penicillins (Unknown)    Intolerances        Present Symptoms:     Dyspnea: 0 1 2 3   Nausea/Vomiting: Yes No  Anxiety:  Yes No  Depression: Yes No  Fatigue: Yes No  Loss of appetite: Yes No    Pain:             Character-            Duration-            Effect-            Factors-            Frequency-            Location-            Severity-    Review of Systems: Reviewed                     Negative:                     Positive:  Unable to obtain due to poor mentation   All others negative    MEDICATIONS  (STANDING):  multiple electrolytes Injection Type 1 1000milliLiter(s) IV Continuous <Continuous>  heparin  Injectable 5000Unit(s) SubCutaneous every 8 hours  ertapenem  IVPB  IV Intermittent   ertapenem  IVPB 500milliGRAM(s) IV Intermittent every 24 hours  methylPREDNISolone sodium succinate Injectable 50milliGRAM(s) IV Push every 6 hours    MEDICATIONS  (PRN):  HYDROmorphone  Injectable 0.5milliGRAM(s) IV Push every 4 hours PRN Severe Pain (7 - 10)    PHYSICAL EXAM:    Vital Signs Last 24 Hrs  T(C): 36.3, Max: 36.7 (05-21 @ 23:05)  T(F): 97.4, Max: 98 (05-21 @ 23:05)  HR: 104 (69 - 104)  BP: 140/80 (102/58 - 140/80)  BP(mean): 83 (83 - 83)  RR: 26 (18 - 32)  SpO2: 93% (91% - 96%)    General: alert  oriented x ____ lethargic agitated                  cachexia  nonverbal  coma    Karnofsky:  %    HEENT: normal  dry mouth  ET tube/trach    Lungs: comfortable tachypnea/labored breathing  excessive secretions    CV: normal  tachycardia    GI: normal  distended  tender  no BS               PEG/NG/OG tube  constipation  last BM:     : normal  incontinent  oliguria/anuria  ridley    MSK: normal  weakness  edema             ambulatory  bedbound/wheelchair bound    Skin: normal  pressure ulcers- Stage_____  no rash    LABS:                        9.7    36.7  )-----------( 292      ( 22 May 2017 07:33 )             28.5     05-22    143  |  103  |  42.0<H>  ----------------------------<  147<H>  3.8   |  28.0  |  1.09    Ca    7.8<L>      22 May 2017 07:33  Phos  5.2     05-21  Mg     3.7     05-21    TPro  6.1<L>  /  Alb  2.9<L>  /  TBili  3.4<H>  /  DBili  2.7<H>  /  AST  67<H>  /  ALT  54<H>  /  AlkPhos  190<H>  05-22    PT/INR - ( 20 May 2017 19:35 )   PT: 15.6 sec;   INR: 1.41 ratio         PTT - ( 20 May 2017 19:35 )  PTT:35.5 sec    I&O's Summary  I & Os for 24h ending 22 May 2017 07:00  =============================================  IN: 2408 ml / OUT: 1390 ml / NET: 1018 ml    I & Os for current day (as of 22 May 2017 11:55)  =============================================  IN: 400 ml / OUT: 0 ml / NET: 400 ml      RADIOLOGY & ADDITIONAL STUDIES:    ADVANCE DIRECTIVES:   DNR YES NO  Completed on:                     MOLST  YES NO   Completed on:  Living Will  YES NO   Completed on: HPI: 89F with PMH as listed admitted 5/20 with fall, abdominal pain found to have gallstone pancreatitis, cholangitis, on IV antibiotics, doing better.     PERTINENT PMH REVIEWED: Yes     PAST MEDICAL & SURGICAL HISTORY:  Pacemaker  High cholesterol  CVA (cerebral infarction)  Hypertension  No significant past surgical history    SOCIAL HISTORY:  nonsmoker                                   Admitted from:  home      Surrogate - Katiana     FAMILY HISTORY:    Baseline ADLs (prior to admission):  Independent - but had been declining prior to admission for about 1 week requiring more assist, and also within the past couple of weeks, had a few falls.     Allergies    penicillins (Unknown)    Present Symptoms:     Dyspnea: 0   Nausea/Vomiting: No  Anxiety:  No  Depression: No  Fatigue: Yes   Loss of appetite: No    Pain: none            Character-            Duration-            Effect-            Factors-            Frequency-            Location-            Severity-    Review of Systems: Reviewed                     Negative:                     Positive:  Unable to obtain due to poor mentation   All others negative    MEDICATIONS  (STANDING):  multiple electrolytes Injection Type 1 1000milliLiter(s) IV Continuous <Continuous>  heparin  Injectable 5000Unit(s) SubCutaneous every 8 hours  ertapenem  IVPB  IV Intermittent   ertapenem  IVPB 500milliGRAM(s) IV Intermittent every 24 hours  methylPREDNISolone sodium succinate Injectable 50milliGRAM(s) IV Push every 6 hours    MEDICATIONS  (PRN):  HYDROmorphone  Injectable 0.5milliGRAM(s) IV Push every 4 hours PRN Severe Pain (7 - 10)    PHYSICAL EXAM:    Vital Signs Last 24 Hrs  T(C): 36.3, Max: 36.7 (05-21 @ 23:05)  T(F): 97.4, Max: 98 (05-21 @ 23:05)  HR: 104 (69 - 104)  BP: 140/80 (102/58 - 140/80)  BP(mean): 83 (83 - 83)  RR: 26 (18 - 32)  SpO2: 93% (91% - 96%)    General: alert and oriented     Karnofsky: 30%    HEENT: normal     Lungs: comfortable    CV: normal     GI: abdomen soft     : normal    MSK: weakness    Skin: no rash    LABS:                        9.7    36.7  )-----------( 292      ( 22 May 2017 07:33 )             28.5     05-22    143  |  103  |  42.0<H>  ----------------------------<  147<H>  3.8   |  28.0  |  1.09    Ca    7.8<L>      22 May 2017 07:33  Phos  5.2     05-21  Mg     3.7     05-21    TPro  6.1<L>  /  Alb  2.9<L>  /  TBili  3.4<H>  /  DBili  2.7<H>  /  AST  67<H>  /  ALT  54<H>  /  AlkPhos  190<H>  05-22    PT/INR - ( 20 May 2017 19:35 )   PT: 15.6 sec;   INR: 1.41 ratio       PTT - ( 20 May 2017 19:35 )  PTT:35.5 sec    I&O's Summary  I & Os for 24h ending 22 May 2017 07:00  =============================================  IN: 2408 ml / OUT: 1390 ml / NET: 1018 ml    I & Os for current day (as of 22 May 2017 11:55)  =============================================  IN: 400 ml / OUT: 0 ml / NET: 400 ml    RADIOLOGY & ADDITIONAL STUDIES:    CXR:   Heart: Cardiomegaly  Mediastinum:  Unremarkable  Lungs/Airways: Mildly increased left base pleural effusion/airspace disease, mild pulmonary edema, stable.  Bones/Soft tissues: Unremarkable    RUQ US: Distended gallbladder with gallstones and sludge with pericholecystic fluid as on the CT. No biliary tree dilatation. Unreliable yet negative sonographic Lewis sign.    CT A/P: Likely acute on chronic interstitial pancreatitis. Cholelithiasis and mild gallbladder wall thickening. Choledocholithiasis, mild biliary ductal dilatation.    ADVANCE DIRECTIVES: DNR/I

## 2017-05-22 NOTE — PROGRESS NOTE ADULT - SUBJECTIVE AND OBJECTIVE BOX
INTERVAL HPI/OVERNIGHT EVENTS/SUBJECTIVE:  Patient transferred to floor from ICU. DNR/DNI, no surgical intervention per patient and family. Palliative care consultation pending. Some improvement in exam as well as laboratory indices. Remains NPO on IVF. ABx.     Vital Signs Last 24 Hrs  T(C): 36.3, Max: 36.7 ( @ 23:05)  T(F): 97.4, Max: 98 ( @ 23:05)  HR: 104 (69 - 104)  BP: 140/80 (102/58 - 140/80)  BP(mean): 83 (83 - 83)  ABP: 130/60 (122/44 - 130/60)  ABP(mean): 87 (73 - 87)  RR: 26 (18 - 32)  SpO2: 93% (91% - 96%)      I&O's Detail    I & Os for current day (as of 22 May 2017 09:32)  =============================================  IN:    multiple electrolytes Injection Type 1: 2300 ml    Solution: 100 ml    norepinephrine Infusion: 8 ml    Total IN: 2408 ml  ---------------------------------------------  OUT:    Indwelling Catheter - Urethral: 1390 ml    Total OUT: 1390 ml  ---------------------------------------------  Total NET: 1018 ml        ABG - ( 21 May 2017 05:46 )  pH: 7.31  /  pCO2: 47    /  pO2: 73    / HCO3: 22    / Base Excess: -2.6  /  SaO2: 93                  MEDICATIONS  (STANDING):  multiple electrolytes Injection Type 1 1000milliLiter(s) IV Continuous <Continuous>  heparin  Injectable 5000Unit(s) SubCutaneous every 8 hours  ertapenem  IVPB  IV Intermittent   ertapenem  IVPB 500milliGRAM(s) IV Intermittent every 24 hours  methylPREDNISolone sodium succinate Injectable 50milliGRAM(s) IV Push every 6 hours    MEDICATIONS  (PRN):  HYDROmorphone  Injectable 0.5milliGRAM(s) IV Push every 4 hours PRN Severe Pain (7 - 10)      NUTRITION/IVF: NPO/ LR @ 100    MOONEY:   YES    PHYSICAL EXAM:    Gen: elderly frail female in no apparent distress    Neurological: A&O x 3. no focal deficits    Pulmonary: on Nasal Cannula. Mild resp distress noted (tachypnea)    Cardiovascular: NSR    Gastrointestinal: Epigastric TTP, soft, no guarding or rebound.     Genitourinary: Mooney    Extremities: no c/c/e    Skin: intact        LABS:  CBC Full  -  ( 22 May 2017 07:33 )  WBC Count : 36.7 K/uL  Hemoglobin : 9.7 g/dL  Hematocrit : 28.5 %  Platelet Count - Automated : 292 K/uL  Mean Cell Volume : 97.3 fl  Mean Cell Hemoglobin : 33.1 pg  Mean Cell Hemoglobin Concentration : 34.0 g/dL  Auto Neutrophil # : x  Auto Lymphocyte # : x  Auto Monocyte # : x  Auto Eosinophil # : x  Auto Basophil # : x  Auto Neutrophil % : x  Auto Lymphocyte % : x  Auto Monocyte % : x  Auto Eosinophil % : x  Auto Basophil % : x    -    143  |  103  |  42.0<H>  ----------------------------<  147<H>  3.8   |  28.0  |  1.09    Ca    7.8<L>      22 May 2017 07:33  Phos  5.2     05-21  Mg     3.7     05-21    TPro  6.1<L>  /  Alb  2.9<L>  /  TBili  3.4<H>  /  DBili  2.7<H>  /  AST  67<H>  /  ALT  54<H>  /  AlkPhos  190<H>  05-22    PT/INR - ( 20 May 2017 19:35 )   PT: 15.6 sec;   INR: 1.41 ratio         PTT - ( 20 May 2017 19:35 )  PTT:35.5 sec  Urinalysis Basic - ( 20 May 2017 11:12 )    Color: Annie / Appearance: Slightly Turbid / S.005 / pH: x  Gluc: x / Ketone: Negative  / Bili: Negative / Urobili: 12 mg/dL   Blood: x / Protein: 30 mg/dL / Nitrite: Negative   Leuk Esterase: Small / RBC: x / WBC 11-25   Sq Epi: x / Non Sq Epi: Occasional / Bacteria: TNTC      RECENT CULTURES:   .Urine Catheterized XXXX XXXX   >100,000 CFU/ml Gram Negative Rods Identification and susceptibility to  follow.     .Blood Blood Escherichia coli XXXX   Growth in aerobic and anaerobic bottles: Escherichia coli  Aerobic Bottle: 10:14 Hours to positivity  Anaerobic Bottle: 9:34 Hours to positivity  .    TYPE: (C=Critical, N=Notification, A=Abnormal) C  TESTS:  _ Gram stain  DATE/TIME CALLED: _  .Blood Blood Escherichia coli XXXX   Growth in aerobic and anaerobic bottles: Escherichia coli  Aerobic Bottle: 9:34 Hours to positivity  Anaerobic Bottle: 9:44 Hours to positivity  .  TYPE: (C=Critical, N=Notification, A=Abnormal) C  TESTS:  _ Gram stain  DATE/TIME CALLED: _         LIVER FUNCTIONS - ( 22 May 2017 07:33 )  Alb: 2.9 g/dL / Pro: 6.1 g/dL / ALK PHOS: 190 U/L / ALT: 54 U/L / AST: 67 U/L / GGT: x               CAPILLARY BLOOD GLUCOSE  141 (22 May 2017 05:22)  161 (21 May 2017 23:47)  122 (21 May 2017 17:00)  106 (21 May 2017 12:00)      RADIOLOGY & ADDITIONAL STUDIES:    ASSESSMENT/PLAN:  89yFemale presenting with: gallstone pancreatitis, cholangitis. bacteremia    - deescalate abx coverage based on sensititivies  - palliative care consult  -pain control  -continue NPO status  -monitor resp status  -DVT proph  - OOB today  -f/u GI  -serial exams and laboratory testing

## 2017-05-22 NOTE — CONSULT NOTE ADULT - PROBLEM SELECTOR RECOMMENDATION 4
-met with patient and her family, two daughters, son in law, and granddaughter. They understand that she is doing a bit better now, but that there is a high likelihood that she can decompensate again without definitive treatment (cholecystectomy). Likely to look into short term rehab setting to complete antibiotics and then back home with services (she lives with her daughter and son in law).

## 2017-05-23 LAB
ALBUMIN SERPL ELPH-MCNC: 2.9 G/DL — LOW (ref 3.3–5.2)
ALP SERPL-CCNC: 229 U/L — HIGH (ref 40–120)
ALT FLD-CCNC: 46 U/L — HIGH
ANION GAP SERPL CALC-SCNC: 14 MMOL/L — SIGNIFICANT CHANGE UP (ref 5–17)
ANISOCYTOSIS BLD QL: SLIGHT — SIGNIFICANT CHANGE UP
AST SERPL-CCNC: 36 U/L — HIGH
BILIRUB SERPL-MCNC: 2.2 MG/DL — HIGH (ref 0.4–2)
BUN SERPL-MCNC: 41 MG/DL — HIGH (ref 8–20)
CALCIUM SERPL-MCNC: 8.8 MG/DL — SIGNIFICANT CHANGE UP (ref 8.6–10.2)
CHLORIDE SERPL-SCNC: 102 MMOL/L — SIGNIFICANT CHANGE UP (ref 98–107)
CO2 SERPL-SCNC: 29 MMOL/L — SIGNIFICANT CHANGE UP (ref 22–29)
CREAT SERPL-MCNC: 0.89 MG/DL — SIGNIFICANT CHANGE UP (ref 0.5–1.3)
GLUCOSE SERPL-MCNC: 120 MG/DL — HIGH (ref 70–115)
HCT VFR BLD CALC: 30.6 % — LOW (ref 37–47)
HGB BLD-MCNC: 10.5 G/DL — LOW (ref 12–16)
LIDOCAIN IGE QN: 73 U/L — HIGH (ref 22–51)
LYMPHOCYTES # BLD AUTO: 0.7 K/UL — LOW (ref 1–4.8)
LYMPHOCYTES # BLD AUTO: 3 % — LOW (ref 20–55)
MACROCYTES BLD QL: SLIGHT — SIGNIFICANT CHANGE UP
MAGNESIUM SERPL-MCNC: 2.7 MG/DL — HIGH (ref 1.6–2.6)
MCHC RBC-ENTMCNC: 32.9 PG — HIGH (ref 27–31)
MCHC RBC-ENTMCNC: 34.3 G/DL — SIGNIFICANT CHANGE UP (ref 32–36)
MCV RBC AUTO: 95.9 FL — SIGNIFICANT CHANGE UP (ref 81–99)
MONOCYTES # BLD AUTO: 0.8 K/UL — SIGNIFICANT CHANGE UP (ref 0–0.8)
MONOCYTES NFR BLD AUTO: 3 % — SIGNIFICANT CHANGE UP (ref 3–10)
NEUTROPHILS # BLD AUTO: 23.4 K/UL — HIGH (ref 1.8–8)
NEUTROPHILS NFR BLD AUTO: 90 % — HIGH (ref 37–73)
NEUTS BAND # BLD: 4 % — SIGNIFICANT CHANGE UP (ref 0–8)
PHOSPHATE SERPL-MCNC: 3.7 MG/DL — SIGNIFICANT CHANGE UP (ref 2.4–4.7)
PLAT MORPH BLD: NORMAL — SIGNIFICANT CHANGE UP
PLATELET # BLD AUTO: 323 K/UL — SIGNIFICANT CHANGE UP (ref 150–400)
POIKILOCYTOSIS BLD QL AUTO: SLIGHT — SIGNIFICANT CHANGE UP
POTASSIUM SERPL-MCNC: 3.4 MMOL/L — LOW (ref 3.5–5.3)
POTASSIUM SERPL-SCNC: 3.4 MMOL/L — LOW (ref 3.5–5.3)
PROT SERPL-MCNC: 6.3 G/DL — LOW (ref 6.6–8.7)
RBC # BLD: 3.19 M/UL — LOW (ref 4.4–5.2)
RBC # FLD: 17.6 % — HIGH (ref 11–15.6)
RBC BLD AUTO: ABNORMAL
SODIUM SERPL-SCNC: 145 MMOL/L — SIGNIFICANT CHANGE UP (ref 135–145)
WBC # BLD: 24.9 K/UL — HIGH (ref 4.8–10.8)
WBC # FLD AUTO: 24.9 K/UL — HIGH (ref 4.8–10.8)

## 2017-05-23 PROCEDURE — 71010: CPT | Mod: 26

## 2017-05-23 PROCEDURE — 93010 ELECTROCARDIOGRAM REPORT: CPT

## 2017-05-23 RX ORDER — SERTRALINE 25 MG/1
50 TABLET, FILM COATED ORAL DAILY
Qty: 0 | Refills: 0 | Status: DISCONTINUED | OUTPATIENT
Start: 2017-05-23 | End: 2017-06-04

## 2017-05-23 RX ORDER — METOPROLOL TARTRATE 50 MG
50 TABLET ORAL
Qty: 0 | Refills: 0 | Status: DISCONTINUED | OUTPATIENT
Start: 2017-05-23 | End: 2017-06-04

## 2017-05-23 RX ORDER — POTASSIUM CHLORIDE 20 MEQ
40 PACKET (EA) ORAL ONCE
Qty: 0 | Refills: 0 | Status: DISCONTINUED | OUTPATIENT
Start: 2017-05-23 | End: 2017-05-23

## 2017-05-23 RX ORDER — IPRATROPIUM/ALBUTEROL SULFATE 18-103MCG
3 AEROSOL WITH ADAPTER (GRAM) INHALATION EVERY 6 HOURS
Qty: 0 | Refills: 0 | Status: DISCONTINUED | OUTPATIENT
Start: 2017-05-23 | End: 2017-06-04

## 2017-05-23 RX ORDER — POTASSIUM CHLORIDE 20 MEQ
40 PACKET (EA) ORAL ONCE
Qty: 0 | Refills: 0 | Status: COMPLETED | OUTPATIENT
Start: 2017-05-23 | End: 2017-05-23

## 2017-05-23 RX ORDER — ALLOPURINOL 300 MG
100 TABLET ORAL DAILY
Qty: 0 | Refills: 0 | Status: DISCONTINUED | OUTPATIENT
Start: 2017-05-23 | End: 2017-06-04

## 2017-05-23 RX ORDER — ALBUTEROL 90 UG/1
1 AEROSOL, METERED ORAL EVERY 4 HOURS
Qty: 0 | Refills: 0 | Status: DISCONTINUED | OUTPATIENT
Start: 2017-05-23 | End: 2017-06-04

## 2017-05-23 RX ORDER — FUROSEMIDE 40 MG
20 TABLET ORAL ONCE
Qty: 0 | Refills: 0 | Status: COMPLETED | OUTPATIENT
Start: 2017-05-23 | End: 2017-05-23

## 2017-05-23 RX ORDER — MAGNESIUM SULFATE 500 MG/ML
2 VIAL (ML) INJECTION ONCE
Qty: 0 | Refills: 0 | Status: DISCONTINUED | OUTPATIENT
Start: 2017-05-23 | End: 2017-05-23

## 2017-05-23 RX ADMIN — Medication 50 MILLIGRAM(S): at 05:39

## 2017-05-23 RX ADMIN — HEPARIN SODIUM 5000 UNIT(S): 5000 INJECTION INTRAVENOUS; SUBCUTANEOUS at 05:42

## 2017-05-23 RX ADMIN — Medication 3 MILLILITER(S): at 21:11

## 2017-05-23 RX ADMIN — Medication 40 MILLIEQUIVALENT(S): at 09:55

## 2017-05-23 RX ADMIN — Medication 20 MILLIGRAM(S): at 15:37

## 2017-05-23 RX ADMIN — Medication 24 MILLIGRAM(S): at 21:39

## 2017-05-23 RX ADMIN — Medication 3 MILLILITER(S): at 16:13

## 2017-05-23 RX ADMIN — HEPARIN SODIUM 5000 UNIT(S): 5000 INJECTION INTRAVENOUS; SUBCUTANEOUS at 21:39

## 2017-05-23 RX ADMIN — ERTAPENEM SODIUM 100 MILLIGRAM(S): 1 INJECTION, POWDER, LYOPHILIZED, FOR SOLUTION INTRAMUSCULAR; INTRAVENOUS at 16:49

## 2017-05-23 RX ADMIN — SERTRALINE 50 MILLIGRAM(S): 25 TABLET, FILM COATED ORAL at 16:49

## 2017-05-23 RX ADMIN — Medication 100 MILLIGRAM(S): at 16:49

## 2017-05-23 RX ADMIN — Medication 50 MILLIGRAM(S): at 11:43

## 2017-05-23 RX ADMIN — Medication 50 MILLIGRAM(S): at 12:15

## 2017-05-23 RX ADMIN — HEPARIN SODIUM 5000 UNIT(S): 5000 INJECTION INTRAVENOUS; SUBCUTANEOUS at 16:50

## 2017-05-23 NOTE — PROGRESS NOTE ADULT - SUBJECTIVE AND OBJECTIVE BOX
SUBJECTIVE:  Patient transferred to floor from ICU. DNR/DNI, no surgical intervention per patient and family. [facility] Patient has mild RUQ TTP.    MEDICATIONS  (STANDING):  heparin  Injectable 5000Unit(s) SubCutaneous every 8 hours  ertapenem  IVPB  IV Intermittent   ertapenem  IVPB 500milliGRAM(s) IV Intermittent every 24 hours  methylPREDNISolone sodium succinate Injectable 50milliGRAM(s) IV Push every 6 hours  potassium chloride   Solution 40milliEquivalent(s) Oral once    MEDICATIONS  (PRN):  HYDROmorphone  Injectable 0.5milliGRAM(s) IV Push every 4 hours PRN Severe Pain (7 - 10)      Vital Signs Last 24 Hrs  T(C): 36.4, Max: 36.7 (05-22 @ 17:15)  T(F): 97.5, Max: 98 (05-22 @ 17:15)  HR: 105 (90 - 105)  BP: 140/70 (120/72 - 154/64)  BP(mean): --  RR: 18 (18 - 26)  SpO2: 95% (93% - 95%)    PE  Gen: NAD, alert and oriented x3  Pulm: nonlabored respirations  Abd: soft, TTP RUQ, no rebound, no guarding  Neuro: GCS15      I&O's Detail    I & Os for current day (as of 23 May 2017 09:22)  =============================================  IN:    multiple electrolytes Injection Type 1multiple electrolytes Injection Type 1: 550 ml    Oral Fluid: 340 ml    Total IN: 890 ml  ---------------------------------------------  OUT:    Indwelling Catheter - Urethral: 2100 ml    Total OUT: 2100 ml  ---------------------------------------------  Total NET: -1210 ml      LABS:                        10.5   24.9  )-----------( 323      ( 23 May 2017 07:22 )             30.6     05-23    145  |  102  |  41.0<H>  ----------------------------<  120<H>  3.4<L>   |  29.0  |  0.89    Ca    8.8      23 May 2017 07:22  Phos  3.7     05-23  Mg     2.7     05-23    TPro  6.3<L>  /  Alb  2.9<L>  /  TBili  2.2<H>  /  DBili  x   /  AST  36<H>  /  ALT  46<H>  /  AlkPhos  229<H>  05-23          RADIOLOGY & ADDITIONAL STUDIES:

## 2017-05-24 LAB
ALBUMIN SERPL ELPH-MCNC: 2.9 G/DL — LOW (ref 3.3–5.2)
ALP SERPL-CCNC: 266 U/L — HIGH (ref 40–120)
ALT FLD-CCNC: 41 U/L — HIGH
ANION GAP SERPL CALC-SCNC: 12 MMOL/L — SIGNIFICANT CHANGE UP (ref 5–17)
ANISOCYTOSIS BLD QL: SLIGHT — SIGNIFICANT CHANGE UP
AST SERPL-CCNC: 28 U/L — SIGNIFICANT CHANGE UP
BILIRUB SERPL-MCNC: 1.5 MG/DL — SIGNIFICANT CHANGE UP (ref 0.4–2)
BUN SERPL-MCNC: 55 MG/DL — HIGH (ref 8–20)
CALCIUM SERPL-MCNC: 8.7 MG/DL — SIGNIFICANT CHANGE UP (ref 8.6–10.2)
CHLORIDE SERPL-SCNC: 104 MMOL/L — SIGNIFICANT CHANGE UP (ref 98–107)
CO2 SERPL-SCNC: 31 MMOL/L — HIGH (ref 22–29)
CREAT SERPL-MCNC: 0.9 MG/DL — SIGNIFICANT CHANGE UP (ref 0.5–1.3)
GLUCOSE SERPL-MCNC: 126 MG/DL — HIGH (ref 70–115)
HCT VFR BLD CALC: 29.8 % — LOW (ref 37–47)
HGB BLD-MCNC: 9.9 G/DL — LOW (ref 12–16)
LYMPHOCYTES # BLD AUTO: 7 % — LOW (ref 20–55)
MACROCYTES BLD QL: SLIGHT — SIGNIFICANT CHANGE UP
MCHC RBC-ENTMCNC: 32.2 PG — HIGH (ref 27–31)
MCHC RBC-ENTMCNC: 33.2 G/DL — SIGNIFICANT CHANGE UP (ref 32–36)
MCV RBC AUTO: 97.1 FL — SIGNIFICANT CHANGE UP (ref 81–99)
MONOCYTES NFR BLD AUTO: 1 % — LOW (ref 3–10)
NEUTROPHILS NFR BLD AUTO: 92 % — HIGH (ref 37–73)
OVALOCYTES BLD QL SMEAR: SLIGHT — SIGNIFICANT CHANGE UP
PLAT MORPH BLD: NORMAL — SIGNIFICANT CHANGE UP
PLATELET # BLD AUTO: 320 K/UL — SIGNIFICANT CHANGE UP (ref 150–400)
POIKILOCYTOSIS BLD QL AUTO: SLIGHT — SIGNIFICANT CHANGE UP
POTASSIUM SERPL-MCNC: 4 MMOL/L — SIGNIFICANT CHANGE UP (ref 3.5–5.3)
POTASSIUM SERPL-SCNC: 4 MMOL/L — SIGNIFICANT CHANGE UP (ref 3.5–5.3)
PROT SERPL-MCNC: 6 G/DL — LOW (ref 6.6–8.7)
RBC # BLD: 3.07 M/UL — LOW (ref 4.4–5.2)
RBC # FLD: 16.7 % — HIGH (ref 11–15.6)
RBC BLD AUTO: ABNORMAL
SODIUM SERPL-SCNC: 147 MMOL/L — HIGH (ref 135–145)
WBC # BLD: 15.5 K/UL — HIGH (ref 4.8–10.8)
WBC # FLD AUTO: 15.5 K/UL — HIGH (ref 4.8–10.8)

## 2017-05-24 RX ADMIN — Medication 4 MILLIGRAM(S): at 05:36

## 2017-05-24 RX ADMIN — HEPARIN SODIUM 5000 UNIT(S): 5000 INJECTION INTRAVENOUS; SUBCUTANEOUS at 18:24

## 2017-05-24 RX ADMIN — Medication 3 MILLILITER(S): at 15:29

## 2017-05-24 RX ADMIN — ERTAPENEM SODIUM 100 MILLIGRAM(S): 1 INJECTION, POWDER, LYOPHILIZED, FOR SOLUTION INTRAMUSCULAR; INTRAVENOUS at 18:24

## 2017-05-24 RX ADMIN — HEPARIN SODIUM 5000 UNIT(S): 5000 INJECTION INTRAVENOUS; SUBCUTANEOUS at 05:37

## 2017-05-24 RX ADMIN — Medication 4 MILLIGRAM(S): at 12:19

## 2017-05-24 RX ADMIN — Medication 50 MILLIGRAM(S): at 18:25

## 2017-05-24 RX ADMIN — Medication 3 MILLILITER(S): at 20:10

## 2017-05-24 RX ADMIN — SERTRALINE 50 MILLIGRAM(S): 25 TABLET, FILM COATED ORAL at 12:18

## 2017-05-24 RX ADMIN — Medication 100 MILLIGRAM(S): at 12:20

## 2017-05-24 RX ADMIN — HEPARIN SODIUM 5000 UNIT(S): 5000 INJECTION INTRAVENOUS; SUBCUTANEOUS at 21:19

## 2017-05-24 RX ADMIN — Medication 50 MILLIGRAM(S): at 05:36

## 2017-05-24 RX ADMIN — Medication 3 MILLILITER(S): at 10:06

## 2017-05-24 RX ADMIN — Medication 4 MILLIGRAM(S): at 18:25

## 2017-05-24 RX ADMIN — Medication 8 MILLIGRAM(S): at 21:19

## 2017-05-24 NOTE — PROGRESS NOTE ADULT - SUBJECTIVE AND OBJECTIVE BOX
SUBJECTIVE: Patient seen and evaluated this am with attending, doing well, WBC count trending down. DNR/DNI, no surgical intervention per patient and family. Patient seen by palliative care. Tolerating regular diet. Scott removed, passed TOV.     MEDICATIONS  (STANDING):  heparin  Injectable 5000Unit(s) SubCutaneous every 8 hours  ertapenem  IVPB  IV Intermittent   ertapenem  IVPB 500milliGRAM(s) IV Intermittent every 24 hours  methylPREDNISolone sodium succinate Injectable 50milliGRAM(s) IV Push every 6 hours  potassium chloride   Solution 40milliEquivalent(s) Oral once    MEDICATIONS  (PRN):  HYDROmorphone  Injectable 0.5milliGRAM(s) IV Push every 4 hours PRN Severe Pain (7 - 10)      Vital Signs Last 24 Hrs  T(C): 36.6, Max: 36.6 (05-23 @ 20:09)  T(F): 97.9, Max: 97.9 (05-24 @ 08:36)  HR: 74 (72 - 84)  BP: 124/62 (124/62 - 136/60)  BP(mean): --  RR: 16 (16 - 17)  SpO2: 94% (94% - 95%)    PE  Gen: NAD, alert and oriented x3  Pulm: nonlabored respirations, CTA bilaterally.  Cardiac - Abnormal rhythm, regular rate    Abd: soft, TTP RUQ, no rebound, no guarding  Neuro: GCS15                            9.9    15.5  )-----------( 320      ( 24 May 2017 07:04 )             29.8   05-24    147<H>  |  104  |  55.0<H>  ----------------------------<  126<H>  4.0   |  31.0<H>  |  0.90    Ca    8.7      24 May 2017 07:04  Phos  3.7     05-23  Mg     2.7     05-23    TPro  6.0<L>  /  Alb  2.9<L>  /  TBili  1.5  /  DBili  x   /  AST  28  /  ALT  41<H>  /  AlkPhos  266<H>  05-24

## 2017-05-25 DIAGNOSIS — R06.2 WHEEZING: ICD-10-CM

## 2017-05-25 LAB
ALBUMIN SERPL ELPH-MCNC: 3 G/DL — LOW (ref 3.3–5.2)
ALP SERPL-CCNC: 296 U/L — HIGH (ref 40–120)
ALT FLD-CCNC: 39 U/L — HIGH
ANION GAP SERPL CALC-SCNC: 11 MMOL/L — SIGNIFICANT CHANGE UP (ref 5–17)
ANION GAP SERPL CALC-SCNC: 14 MMOL/L — SIGNIFICANT CHANGE UP (ref 5–17)
AST SERPL-CCNC: 30 U/L — SIGNIFICANT CHANGE UP
BASOPHILS # BLD AUTO: 0 K/UL — SIGNIFICANT CHANGE UP (ref 0–0.2)
BASOPHILS NFR BLD AUTO: 0.2 % — SIGNIFICANT CHANGE UP (ref 0–2)
BILIRUB SERPL-MCNC: 1.7 MG/DL — SIGNIFICANT CHANGE UP (ref 0.4–2)
BUN SERPL-MCNC: 55 MG/DL — HIGH (ref 8–20)
BUN SERPL-MCNC: 58 MG/DL — HIGH (ref 8–20)
CALCIUM SERPL-MCNC: 9.2 MG/DL — SIGNIFICANT CHANGE UP (ref 8.6–10.2)
CALCIUM SERPL-MCNC: 9.7 MG/DL — SIGNIFICANT CHANGE UP (ref 8.6–10.2)
CHLORIDE SERPL-SCNC: 101 MMOL/L — SIGNIFICANT CHANGE UP (ref 98–107)
CHLORIDE SERPL-SCNC: 98 MMOL/L — SIGNIFICANT CHANGE UP (ref 98–107)
CO2 SERPL-SCNC: 32 MMOL/L — HIGH (ref 22–29)
CO2 SERPL-SCNC: 32 MMOL/L — HIGH (ref 22–29)
CREAT SERPL-MCNC: 0.78 MG/DL — SIGNIFICANT CHANGE UP (ref 0.5–1.3)
CREAT SERPL-MCNC: 0.79 MG/DL — SIGNIFICANT CHANGE UP (ref 0.5–1.3)
GLUCOSE SERPL-MCNC: 108 MG/DL — SIGNIFICANT CHANGE UP (ref 70–115)
GLUCOSE SERPL-MCNC: 115 MG/DL — SIGNIFICANT CHANGE UP (ref 70–115)
HCT VFR BLD CALC: 33.8 % — LOW (ref 37–47)
HCT VFR BLD CALC: 34 % — LOW (ref 37–47)
HGB BLD-MCNC: 11.2 G/DL — LOW (ref 12–16)
HGB BLD-MCNC: 11.3 G/DL — LOW (ref 12–16)
LYMPHOCYTES # BLD AUTO: 1.1 K/UL — SIGNIFICANT CHANGE UP (ref 1–4.8)
LYMPHOCYTES # BLD AUTO: 5.9 % — LOW (ref 20–55)
MAGNESIUM SERPL-MCNC: 2 MG/DL — SIGNIFICANT CHANGE UP (ref 1.6–2.6)
MCHC RBC-ENTMCNC: 32.6 PG — HIGH (ref 27–31)
MCHC RBC-ENTMCNC: 33 PG — HIGH (ref 27–31)
MCHC RBC-ENTMCNC: 33.1 G/DL — SIGNIFICANT CHANGE UP (ref 32–36)
MCHC RBC-ENTMCNC: 33.2 G/DL — SIGNIFICANT CHANGE UP (ref 32–36)
MCV RBC AUTO: 98.3 FL — SIGNIFICANT CHANGE UP (ref 81–99)
MCV RBC AUTO: 99.4 FL — HIGH (ref 81–99)
MONOCYTES # BLD AUTO: 1.1 K/UL — HIGH (ref 0–0.8)
MONOCYTES NFR BLD AUTO: 5.5 % — SIGNIFICANT CHANGE UP (ref 3–10)
NEUTROPHILS # BLD AUTO: 16.3 K/UL — HIGH (ref 1.8–8)
NEUTROPHILS NFR BLD AUTO: 84.5 % — HIGH (ref 37–73)
PHOSPHATE SERPL-MCNC: 3.4 MG/DL — SIGNIFICANT CHANGE UP (ref 2.4–4.7)
PLATELET # BLD AUTO: 379 K/UL — SIGNIFICANT CHANGE UP (ref 150–400)
PLATELET # BLD AUTO: 386 K/UL — SIGNIFICANT CHANGE UP (ref 150–400)
POTASSIUM SERPL-MCNC: 4.2 MMOL/L — SIGNIFICANT CHANGE UP (ref 3.5–5.3)
POTASSIUM SERPL-MCNC: 4.5 MMOL/L — SIGNIFICANT CHANGE UP (ref 3.5–5.3)
POTASSIUM SERPL-SCNC: 4.2 MMOL/L — SIGNIFICANT CHANGE UP (ref 3.5–5.3)
POTASSIUM SERPL-SCNC: 4.5 MMOL/L — SIGNIFICANT CHANGE UP (ref 3.5–5.3)
PROT SERPL-MCNC: 6.2 G/DL — LOW (ref 6.6–8.7)
RBC # BLD: 3.42 M/UL — LOW (ref 4.4–5.2)
RBC # BLD: 3.44 M/UL — LOW (ref 4.4–5.2)
RBC # FLD: 16.9 % — HIGH (ref 11–15.6)
RBC # FLD: 17 % — HIGH (ref 11–15.6)
SODIUM SERPL-SCNC: 144 MMOL/L — SIGNIFICANT CHANGE UP (ref 135–145)
SODIUM SERPL-SCNC: 144 MMOL/L — SIGNIFICANT CHANGE UP (ref 135–145)
WBC # BLD: 19.3 K/UL — HIGH (ref 4.8–10.8)
WBC # BLD: 19.3 K/UL — HIGH (ref 4.8–10.8)
WBC # FLD AUTO: 19.3 K/UL — HIGH (ref 4.8–10.8)
WBC # FLD AUTO: 19.3 K/UL — HIGH (ref 4.8–10.8)

## 2017-05-25 PROCEDURE — 71010: CPT | Mod: 26

## 2017-05-25 RX ORDER — IPRATROPIUM/ALBUTEROL SULFATE 18-103MCG
3 AEROSOL WITH ADAPTER (GRAM) INHALATION EVERY 4 HOURS
Qty: 0 | Refills: 0 | Status: DISCONTINUED | OUTPATIENT
Start: 2017-05-25 | End: 2017-06-04

## 2017-05-25 RX ORDER — FUROSEMIDE 40 MG
20 TABLET ORAL ONCE
Qty: 0 | Refills: 0 | Status: COMPLETED | OUTPATIENT
Start: 2017-05-25 | End: 2017-05-25

## 2017-05-25 RX ORDER — ACETAMINOPHEN 500 MG
500 TABLET ORAL ONCE
Qty: 0 | Refills: 0 | Status: COMPLETED | OUTPATIENT
Start: 2017-05-25 | End: 2017-05-25

## 2017-05-25 RX ADMIN — Medication 200 MILLIGRAM(S): at 16:06

## 2017-05-25 RX ADMIN — Medication 50 MILLIGRAM(S): at 17:43

## 2017-05-25 RX ADMIN — HEPARIN SODIUM 5000 UNIT(S): 5000 INJECTION INTRAVENOUS; SUBCUTANEOUS at 05:42

## 2017-05-25 RX ADMIN — Medication 50 MILLIGRAM(S): at 05:42

## 2017-05-25 RX ADMIN — Medication 4 MILLIGRAM(S): at 12:30

## 2017-05-25 RX ADMIN — Medication 100 MILLIGRAM(S): at 12:29

## 2017-05-25 RX ADMIN — HEPARIN SODIUM 5000 UNIT(S): 5000 INJECTION INTRAVENOUS; SUBCUTANEOUS at 12:29

## 2017-05-25 RX ADMIN — Medication 20 MILLIGRAM(S): at 09:18

## 2017-05-25 RX ADMIN — Medication 3 MILLILITER(S): at 15:28

## 2017-05-25 RX ADMIN — Medication 3 MILLILITER(S): at 21:43

## 2017-05-25 RX ADMIN — SERTRALINE 50 MILLIGRAM(S): 25 TABLET, FILM COATED ORAL at 12:28

## 2017-05-25 RX ADMIN — Medication 4 MILLIGRAM(S): at 21:17

## 2017-05-25 RX ADMIN — Medication 3 MILLILITER(S): at 08:48

## 2017-05-25 RX ADMIN — Medication 4 MILLIGRAM(S): at 06:22

## 2017-05-25 RX ADMIN — Medication 500 MILLIGRAM(S): at 17:43

## 2017-05-25 RX ADMIN — ERTAPENEM SODIUM 100 MILLIGRAM(S): 1 INJECTION, POWDER, LYOPHILIZED, FOR SOLUTION INTRAMUSCULAR; INTRAVENOUS at 17:43

## 2017-05-25 RX ADMIN — Medication 4 MILLIGRAM(S): at 17:43

## 2017-05-25 RX ADMIN — HEPARIN SODIUM 5000 UNIT(S): 5000 INJECTION INTRAVENOUS; SUBCUTANEOUS at 21:17

## 2017-05-25 NOTE — PROGRESS NOTE ADULT - SUBJECTIVE AND OBJECTIVE BOX
SUBJECTIVE/24 hour events:  Patient is a 89yFemale with resolving acute bilary pancreatitis and sepsis, remains on invanz, mild increase in wbc. Patient some increased tachypnea with expiratory wheezing, chest xray showed  cephalization, a dose of lasix of given . Nebs continued. Continued on steroid taper via medrol dose dina.      Vital Signs Last 24 Hrs  T(C): 36.6, Max: 36.6 (05-24 @ 21:15)  T(F): 97.9, Max: 97.9 (05-24 @ 21:15)  HR: 76 (76 - 93)  BP: 148/70 (142/56 - 156/80)  BP(mean): --  RR: 28 (18 - 36)  SpO2: 96% (95% - 96%)  Drug Dosing Weight  Height (cm): 152.4 (20 May 2017 06:20)  Weight (kg): 50.8 (20 May 2017 06:20)  BMI (kg/m2): 21.9 (20 May 2017 06:20)  BSA (m2): 1.46 (20 May 2017 06:20)  I&O's Detail  I & Os for 24h ending 25 May 2017 07:00  =============================================  IN:    Oral Fluid: 540 ml    Total IN: 540 ml  ---------------------------------------------  OUT:    Voided: 200 ml    Total OUT: 200 ml  ---------------------------------------------  Total NET: 340 ml    I & Os for current day (as of 25 May 2017 11:18)  =============================================  IN:    Oral Fluid: 120 ml    Total IN: 120 ml  ---------------------------------------------  OUT:    Voided: 100 ml    Total OUT: 100 ml  ---------------------------------------------  Total NET: 20 ml    Allergies    penicillins (Unknown)    Intolerances                              11.2   19.3  )-----------( 386      ( 25 May 2017 07:47 )             33.8   05-25    144  |  101  |  55.0<H>  ----------------------------<  108  4.5   |  32.0<H>  |  0.79    Ca    9.2      25 May 2017 07:47  Phos  3.4     05-25  Mg     2.0     05-25    TPro  6.0<L>  /  Alb  2.9<L>  /  TBili  1.5  /  DBili  x   /  AST  28  /  ALT  41<H>  /  AlkPhos  266<H>  05-24      ROS:    PHYSICAL EXAM:  Constitutional: in good spirits     Respiratory: mild crackles at bases with mild expiratory wheezes, supplemental O2 as needed    Cardiovascular: VS WNL    Gastrointestinal: abdomen soft, non-tender, non-distended, tolerating a regular diet with ensures     Genitourinary: passed tov, increasing ratio with bun and creatinine, encourage hydration     Extremities: moves all extremities at baseline     Neurological: at baseline mental status , pleasant , no pain issues    Skin: warm, dry and no rashes          MEDICATIONS  (STANDING):  heparin  Injectable 5000Unit(s) SubCutaneous every 8 hours  ertapenem  IVPB  IV Intermittent   ertapenem  IVPB 500milliGRAM(s) IV Intermittent every 24 hours  metoprolol 50milliGRAM(s) Oral two times a day  allopurinol 100milliGRAM(s) Oral daily  sertraline 50milliGRAM(s) Oral daily  ALBUTerol/ipratropium for Nebulization 3milliLiter(s) Nebulizer every 6 hours  ALBUTerol    90 MICROgram(s) HFA Inhaler 1Puff(s) Inhalation every 4 hours  methylPREDNISolone 4milliGRAM(s) Oral before breakfast  methylPREDNISolone 4milliGRAM(s) Oral after lunch  methylPREDNISolone 4milliGRAM(s) Oral after dinner  methylPREDNISolone milliGRAM(s) Oral   methylPREDNISolone 4milliGRAM(s) Oral at bedtime    MEDICATIONS  (PRN):      RADIOLOGY STUDIES:    CULTURES:

## 2017-05-25 NOTE — PROGRESS NOTE ADULT - PROBLEM SELECTOR PLAN 2
monitor respiratory status, continue nebulizer treatments, pulmonary toliet, incentive spirometer if able to participate,

## 2017-05-26 LAB
ANION GAP SERPL CALC-SCNC: 11 MMOL/L — SIGNIFICANT CHANGE UP (ref 5–17)
ANISOCYTOSIS BLD QL: SLIGHT — SIGNIFICANT CHANGE UP
BUN SERPL-MCNC: 52 MG/DL — HIGH (ref 8–20)
CALCIUM SERPL-MCNC: 9 MG/DL — SIGNIFICANT CHANGE UP (ref 8.6–10.2)
CHLORIDE SERPL-SCNC: 101 MMOL/L — SIGNIFICANT CHANGE UP (ref 98–107)
CO2 SERPL-SCNC: 34 MMOL/L — HIGH (ref 22–29)
CREAT SERPL-MCNC: 0.7 MG/DL — SIGNIFICANT CHANGE UP (ref 0.5–1.3)
GLUCOSE SERPL-MCNC: 87 MG/DL — SIGNIFICANT CHANGE UP (ref 70–115)
HCT VFR BLD CALC: 33.4 % — LOW (ref 37–47)
HGB BLD-MCNC: 10.8 G/DL — LOW (ref 12–16)
LYMPHOCYTES # BLD AUTO: 11 % — LOW (ref 20–55)
MACROCYTES BLD QL: SLIGHT — SIGNIFICANT CHANGE UP
MAGNESIUM SERPL-MCNC: 1.8 MG/DL — SIGNIFICANT CHANGE UP (ref 1.6–2.6)
MCHC RBC-ENTMCNC: 32 PG — HIGH (ref 27–31)
MCHC RBC-ENTMCNC: 32.3 G/DL — SIGNIFICANT CHANGE UP (ref 32–36)
MCV RBC AUTO: 98.8 FL — SIGNIFICANT CHANGE UP (ref 81–99)
MONOCYTES NFR BLD AUTO: 6 % — SIGNIFICANT CHANGE UP (ref 3–10)
MYELOCYTES NFR BLD: 2 % — HIGH (ref 0–0)
NEUTROPHILS NFR BLD AUTO: 81 % — HIGH (ref 37–73)
OVALOCYTES BLD QL SMEAR: SLIGHT — SIGNIFICANT CHANGE UP
PHOSPHATE SERPL-MCNC: 3.7 MG/DL — SIGNIFICANT CHANGE UP (ref 2.4–4.7)
PLAT MORPH BLD: NORMAL — SIGNIFICANT CHANGE UP
PLATELET # BLD AUTO: 344 K/UL — SIGNIFICANT CHANGE UP (ref 150–400)
POIKILOCYTOSIS BLD QL AUTO: SLIGHT — SIGNIFICANT CHANGE UP
POTASSIUM SERPL-MCNC: 4.1 MMOL/L — SIGNIFICANT CHANGE UP (ref 3.5–5.3)
POTASSIUM SERPL-SCNC: 4.1 MMOL/L — SIGNIFICANT CHANGE UP (ref 3.5–5.3)
RBC # BLD: 3.38 M/UL — LOW (ref 4.4–5.2)
RBC # FLD: 16.7 % — HIGH (ref 11–15.6)
RBC BLD AUTO: ABNORMAL
SODIUM SERPL-SCNC: 146 MMOL/L — HIGH (ref 135–145)
TOXIC GRANULES BLD QL SMEAR: PRESENT — SIGNIFICANT CHANGE UP
WBC # BLD: 18.5 K/UL — HIGH (ref 4.8–10.8)
WBC # FLD AUTO: 18.5 K/UL — HIGH (ref 4.8–10.8)

## 2017-05-26 RX ORDER — MAGNESIUM SULFATE 500 MG/ML
2 VIAL (ML) INJECTION ONCE
Qty: 0 | Refills: 0 | Status: COMPLETED | OUTPATIENT
Start: 2017-05-26 | End: 2017-05-26

## 2017-05-26 RX ADMIN — Medication 4 MILLIGRAM(S): at 06:21

## 2017-05-26 RX ADMIN — Medication 3 MILLILITER(S): at 08:15

## 2017-05-26 RX ADMIN — HEPARIN SODIUM 5000 UNIT(S): 5000 INJECTION INTRAVENOUS; SUBCUTANEOUS at 05:16

## 2017-05-26 RX ADMIN — Medication 3 MILLILITER(S): at 03:20

## 2017-05-26 RX ADMIN — HEPARIN SODIUM 5000 UNIT(S): 5000 INJECTION INTRAVENOUS; SUBCUTANEOUS at 15:49

## 2017-05-26 RX ADMIN — SERTRALINE 50 MILLIGRAM(S): 25 TABLET, FILM COATED ORAL at 12:13

## 2017-05-26 RX ADMIN — ERTAPENEM SODIUM 100 MILLIGRAM(S): 1 INJECTION, POWDER, LYOPHILIZED, FOR SOLUTION INTRAMUSCULAR; INTRAVENOUS at 18:44

## 2017-05-26 RX ADMIN — Medication 50 MILLIGRAM(S): at 05:16

## 2017-05-26 RX ADMIN — HEPARIN SODIUM 5000 UNIT(S): 5000 INJECTION INTRAVENOUS; SUBCUTANEOUS at 21:10

## 2017-05-26 RX ADMIN — Medication 3 MILLILITER(S): at 14:53

## 2017-05-26 RX ADMIN — Medication 4 MILLIGRAM(S): at 21:10

## 2017-05-26 RX ADMIN — Medication 3 MILLILITER(S): at 21:09

## 2017-05-26 RX ADMIN — Medication 50 GRAM(S): at 12:13

## 2017-05-26 RX ADMIN — Medication 4 MILLIGRAM(S): at 12:14

## 2017-05-26 RX ADMIN — Medication 100 MILLIGRAM(S): at 12:13

## 2017-05-26 RX ADMIN — Medication 50 MILLIGRAM(S): at 18:33

## 2017-05-26 NOTE — CHART NOTE - NSCHARTNOTEFT_GEN_A_CORE
Upon Nutritional Assessment by the Registered Dietitian your patient was determined to meet criteria / has evidence of the following diagnosis/diagnoses:          [ ]  Mild Protein Calorie Malnutrition        [ x ]  Moderate Protein Calorie Malnutrition        [ ] Severe Protein Calorie Malnutrition        [ ] Unspecified Protein Calorie Malnutrition        [ ] Underweight / BMI <19        [ ] Morbid Obesity / BMI > 40      Findings as based on:  •  Comprehensive nutrition assessment and consultation  •  Calorie counts (nutrient intake analysis)  •  Food acceptance and intake status from observations by staff  •  Follow up  •  Patient education  •  Intervention secondary to interdisciplinary rounds  •   concerns      Treatment:    The following diet has been recommended:  Continue Ensure Enlive Supplementation. Low Fat diet recommended in attempt to alleviate pain 2/2 acute pancreatitis per chart.       PROVIDER Section:     By signing this assessment you are acknowledging and agree with the diagnosis/diagnoses assigned by the Registered Dietitian    Comments:

## 2017-05-26 NOTE — DIETITIAN INITIAL EVALUATION ADULT. - NS AS NUTRI INTERV MEALS SNACK
Increased PO intake and nutritional supplementation encouraged. Low fat diet recommended due to stomach pain 2/2 pancreatitis per chart. Food preferences noted, Food menu filled/General/healthful diet

## 2017-05-26 NOTE — DIETITIAN INITIAL EVALUATION ADULT. - NUTRITIONGOAL OUTCOME1
Pt will consume >75% of meals and will consume 2-3 cans of nutritional supplementation Pt will gain wt and consume >75% of meals and 2-3 cans of nutritional supplementation

## 2017-05-26 NOTE — CHART NOTE - NSCHARTNOTEFT_GEN_A_CORE
Patient seen and examined.  Tolerating diet  leukocytosis trending down.  On Ertapenem  labs consistent with passing of stone.  Discussed with family that we shall continue to finish at least 10 days of antibiotics and since her labs suggest passing of stone will asses off antibiotic upon completion of course.

## 2017-05-26 NOTE — DIETITIAN INITIAL EVALUATION ADULT. - OTHER INFO
Pt seen with daughter at bedside. Pt has consistent poor appetite as stated by pt and daughter. Stated poor PO intake and observed breakfast tray with <50% consumed. Pt appetite PTA poor due to pain in stomach however, pt would have minimal PO intake prior to the time pain was present as well due to quick satiety. Pt denies n/v/d/c. Encouraged to drink nutritional supplementation. Food menu filled out by daughter.

## 2017-05-27 LAB
ANION GAP SERPL CALC-SCNC: 14 MMOL/L — SIGNIFICANT CHANGE UP (ref 5–17)
ANISOCYTOSIS BLD QL: SLIGHT — SIGNIFICANT CHANGE UP
BUN SERPL-MCNC: 44 MG/DL — HIGH (ref 8–20)
CALCIUM SERPL-MCNC: 9.1 MG/DL — SIGNIFICANT CHANGE UP (ref 8.6–10.2)
CHLORIDE SERPL-SCNC: 99 MMOL/L — SIGNIFICANT CHANGE UP (ref 98–107)
CO2 SERPL-SCNC: 33 MMOL/L — HIGH (ref 22–29)
CREAT SERPL-MCNC: 0.7 MG/DL — SIGNIFICANT CHANGE UP (ref 0.5–1.3)
CULTURE RESULTS: SIGNIFICANT CHANGE UP
CULTURE RESULTS: SIGNIFICANT CHANGE UP
GLUCOSE SERPL-MCNC: 90 MG/DL — SIGNIFICANT CHANGE UP (ref 70–115)
HCT VFR BLD CALC: 31.8 % — LOW (ref 37–47)
HGB BLD-MCNC: 10.4 G/DL — LOW (ref 12–16)
HYPOCHROMIA BLD QL: SLIGHT — SIGNIFICANT CHANGE UP
LYMPHOCYTES # BLD AUTO: 9 % — LOW (ref 20–55)
MACROCYTES BLD QL: SLIGHT — SIGNIFICANT CHANGE UP
MAGNESIUM SERPL-MCNC: 2 MG/DL — SIGNIFICANT CHANGE UP (ref 1.6–2.6)
MCHC RBC-ENTMCNC: 32.4 PG — HIGH (ref 27–31)
MCHC RBC-ENTMCNC: 32.7 G/DL — SIGNIFICANT CHANGE UP (ref 32–36)
MCV RBC AUTO: 99.1 FL — HIGH (ref 81–99)
METAMYELOCYTES # FLD: 2 % — HIGH (ref 0–0)
MICROCYTES BLD QL: SLIGHT — SIGNIFICANT CHANGE UP
MONOCYTES NFR BLD AUTO: 5 % — SIGNIFICANT CHANGE UP (ref 3–10)
MYELOCYTES NFR BLD: 1 % — HIGH (ref 0–0)
NEUTROPHILS NFR BLD AUTO: 79 % — HIGH (ref 37–73)
NEUTS BAND # BLD: 2 % — SIGNIFICANT CHANGE UP (ref 0–8)
OVALOCYTES BLD QL SMEAR: SLIGHT — SIGNIFICANT CHANGE UP
PLAT MORPH BLD: NORMAL — SIGNIFICANT CHANGE UP
PLATELET # BLD AUTO: 378 K/UL — SIGNIFICANT CHANGE UP (ref 150–400)
POIKILOCYTOSIS BLD QL AUTO: SLIGHT — SIGNIFICANT CHANGE UP
POTASSIUM SERPL-MCNC: 4.5 MMOL/L — SIGNIFICANT CHANGE UP (ref 3.5–5.3)
POTASSIUM SERPL-SCNC: 4.5 MMOL/L — SIGNIFICANT CHANGE UP (ref 3.5–5.3)
RBC # BLD: 3.21 M/UL — LOW (ref 4.4–5.2)
RBC # FLD: 17 % — HIGH (ref 11–15.6)
RBC BLD AUTO: ABNORMAL
SODIUM SERPL-SCNC: 146 MMOL/L — HIGH (ref 135–145)
SPECIMEN SOURCE: SIGNIFICANT CHANGE UP
SPECIMEN SOURCE: SIGNIFICANT CHANGE UP
TARGETS BLD QL SMEAR: SLIGHT — SIGNIFICANT CHANGE UP
VARIANT LYMPHS # BLD: 2 % — SIGNIFICANT CHANGE UP (ref 0–6)
WBC # BLD: 18.1 K/UL — HIGH (ref 4.8–10.8)
WBC # FLD AUTO: 18.1 K/UL — HIGH (ref 4.8–10.8)

## 2017-05-27 RX ADMIN — Medication 3 MILLILITER(S): at 03:19

## 2017-05-27 RX ADMIN — Medication 50 MILLIGRAM(S): at 05:12

## 2017-05-27 RX ADMIN — HEPARIN SODIUM 5000 UNIT(S): 5000 INJECTION INTRAVENOUS; SUBCUTANEOUS at 21:26

## 2017-05-27 RX ADMIN — HEPARIN SODIUM 5000 UNIT(S): 5000 INJECTION INTRAVENOUS; SUBCUTANEOUS at 14:22

## 2017-05-27 RX ADMIN — Medication 3 MILLILITER(S): at 08:18

## 2017-05-27 RX ADMIN — ERTAPENEM SODIUM 100 MILLIGRAM(S): 1 INJECTION, POWDER, LYOPHILIZED, FOR SOLUTION INTRAMUSCULAR; INTRAVENOUS at 18:16

## 2017-05-27 RX ADMIN — Medication 4 MILLIGRAM(S): at 05:12

## 2017-05-27 RX ADMIN — Medication 3 MILLILITER(S): at 20:11

## 2017-05-27 RX ADMIN — Medication 50 MILLIGRAM(S): at 18:16

## 2017-05-27 RX ADMIN — Medication 3 MILLILITER(S): at 15:22

## 2017-05-27 RX ADMIN — HEPARIN SODIUM 5000 UNIT(S): 5000 INJECTION INTRAVENOUS; SUBCUTANEOUS at 05:12

## 2017-05-27 RX ADMIN — Medication 4 MILLIGRAM(S): at 21:26

## 2017-05-27 RX ADMIN — SERTRALINE 50 MILLIGRAM(S): 25 TABLET, FILM COATED ORAL at 12:03

## 2017-05-27 RX ADMIN — Medication 100 MILLIGRAM(S): at 12:03

## 2017-05-27 NOTE — PROGRESS NOTE ADULT - SUBJECTIVE AND OBJECTIVE BOX
HPI/OVERNIGHT EVENTS: Patient seen and examined at bedside. She reports feeling well at this time, she denies any current complaints or problems overnight. She states she has no abdominal pain. Denies nausea/vomiting. States she does not have an appetite but has been tolerating her ensures. Having bowel function and voiding. She denies difficulty breathing. Denies fever/chills/CP.    MEDICATIONS  (STANDING):  heparin  Injectable 5000Unit(s) SubCutaneous every 8 hours  ertapenem  IVPB  IV Intermittent   ertapenem  IVPB 500milliGRAM(s) IV Intermittent every 24 hours  metoprolol 50milliGRAM(s) Oral two times a day  allopurinol 100milliGRAM(s) Oral daily  sertraline 50milliGRAM(s) Oral daily  ALBUTerol/ipratropium for Nebulization 3milliLiter(s) Nebulizer every 6 hours  ALBUTerol    90 MICROgram(s) HFA Inhaler 1Puff(s) Inhalation every 4 hours  methylPREDNISolone 4milliGRAM(s) Oral before breakfast  methylPREDNISolone milliGRAM(s) Oral   methylPREDNISolone 4milliGRAM(s) Oral at bedtime    MEDICATIONS  (PRN):  ALBUTerol/ipratropium for Nebulization 3milliLiter(s) Nebulizer every 4 hours PRN additional wheezing      Vital Signs Last 24 Hrs  T(C): 36.4, Max: 36.7 (05-26 @ 12:10)  T(F): 97.6, Max: 98 (05-26 @ 12:10)  HR: 84 (73 - 84)  BP: 131/46 (120/58 - 143/66)  BP(mean): --  RR: 18 (18 - 20)  SpO2: 96% (92% - 97%)    Constitutional: patient resting comfortably in bed, easily woken from sleep, in no acute distress  HEENT: nasal cannula in place, EOMI / PERRL b/l, no active drainage or redness  Neck: No JVD, full ROM without pain  Respiratory: mild crackles at lung bases, no accessory muscle use, no conversational dyspnea, breathing is unlabored  Cardiovascular: Regular rate & rhythm, +S1, S2  Gastrointestinal: Abdomen soft and non-tender, non-distended, no rebound tenderness / guarding  Neurological: A&Ox3, no gross sensory / motor / coordination deficits  Psychiatric: Normal mood, normal affect  Skin: Warm & dry, no rashes      I&O's Detail    I & Os for current day (as of 27 May 2017 10:00)  =============================================  IN:    Oral Fluid: 1440 ml    Solution: 50 ml    Solution: 50 ml    Total IN: 1540 ml  ---------------------------------------------  OUT:    Voided: 300 ml    Total OUT: 300 ml  ---------------------------------------------  Total NET: 1240 ml      LABS:                        10.4   18.1  )-----------( 378      ( 27 May 2017 06:13 )             31.8     05-27    146<H>  |  99  |  44.0<H>  ----------------------------<  90  4.5   |  33.0<H>  |  0.70    Ca    9.1      27 May 2017 06:13  Phos  3.7     05-26  Mg     2.0     05-27    TPro  6.2<L>  /  Alb  3.0<L>  /  TBili  1.7  /  DBili  x   /  AST  30  /  ALT  39<H>  /  AlkPhos  296<H>  05-25

## 2017-05-28 LAB
ANION GAP SERPL CALC-SCNC: 9 MMOL/L — SIGNIFICANT CHANGE UP (ref 5–17)
ANISOCYTOSIS BLD QL: SLIGHT — SIGNIFICANT CHANGE UP
BUN SERPL-MCNC: 41 MG/DL — HIGH (ref 8–20)
CALCIUM SERPL-MCNC: 8.8 MG/DL — SIGNIFICANT CHANGE UP (ref 8.6–10.2)
CHLORIDE SERPL-SCNC: 103 MMOL/L — SIGNIFICANT CHANGE UP (ref 98–107)
CO2 SERPL-SCNC: 34 MMOL/L — HIGH (ref 22–29)
CREAT SERPL-MCNC: 0.71 MG/DL — SIGNIFICANT CHANGE UP (ref 0.5–1.3)
EOSINOPHIL NFR BLD AUTO: 1 % — SIGNIFICANT CHANGE UP (ref 0–5)
GLUCOSE SERPL-MCNC: 120 MG/DL — HIGH (ref 70–115)
HCT VFR BLD CALC: 30.7 % — LOW (ref 37–47)
HGB BLD-MCNC: 10 G/DL — LOW (ref 12–16)
HYPOCHROMIA BLD QL: SLIGHT — SIGNIFICANT CHANGE UP
LYMPHOCYTES # BLD AUTO: 6 % — LOW (ref 20–55)
MACROCYTES BLD QL: SLIGHT — SIGNIFICANT CHANGE UP
MAGNESIUM SERPL-MCNC: 1.8 MG/DL — SIGNIFICANT CHANGE UP (ref 1.6–2.6)
MCHC RBC-ENTMCNC: 32.5 PG — HIGH (ref 27–31)
MCHC RBC-ENTMCNC: 32.6 G/DL — SIGNIFICANT CHANGE UP (ref 32–36)
MCV RBC AUTO: 99.7 FL — HIGH (ref 81–99)
MONOCYTES NFR BLD AUTO: 17 % — HIGH (ref 3–10)
NEUTROPHILS NFR BLD AUTO: 76 % — HIGH (ref 37–73)
OVALOCYTES BLD QL SMEAR: SLIGHT — SIGNIFICANT CHANGE UP
PHOSPHATE SERPL-MCNC: 2.7 MG/DL — SIGNIFICANT CHANGE UP (ref 2.4–4.7)
PLAT MORPH BLD: NORMAL — SIGNIFICANT CHANGE UP
PLATELET # BLD AUTO: 398 K/UL — SIGNIFICANT CHANGE UP (ref 150–400)
POIKILOCYTOSIS BLD QL AUTO: SLIGHT — SIGNIFICANT CHANGE UP
POTASSIUM SERPL-MCNC: 4.5 MMOL/L — SIGNIFICANT CHANGE UP (ref 3.5–5.3)
POTASSIUM SERPL-SCNC: 4.5 MMOL/L — SIGNIFICANT CHANGE UP (ref 3.5–5.3)
RBC # BLD: 3.08 M/UL — LOW (ref 4.4–5.2)
RBC # FLD: 17.2 % — HIGH (ref 11–15.6)
RBC BLD AUTO: ABNORMAL
SODIUM SERPL-SCNC: 146 MMOL/L — HIGH (ref 135–145)
WBC # BLD: 17.8 K/UL — HIGH (ref 4.8–10.8)
WBC # FLD AUTO: 17.8 K/UL — HIGH (ref 4.8–10.8)

## 2017-05-28 RX ORDER — FUROSEMIDE 40 MG
20 TABLET ORAL EVERY OTHER DAY
Qty: 0 | Refills: 0 | Status: DISCONTINUED | OUTPATIENT
Start: 2017-05-28 | End: 2017-06-04

## 2017-05-28 RX ADMIN — HEPARIN SODIUM 5000 UNIT(S): 5000 INJECTION INTRAVENOUS; SUBCUTANEOUS at 05:22

## 2017-05-28 RX ADMIN — Medication 3 MILLILITER(S): at 08:48

## 2017-05-28 RX ADMIN — HEPARIN SODIUM 5000 UNIT(S): 5000 INJECTION INTRAVENOUS; SUBCUTANEOUS at 21:17

## 2017-05-28 RX ADMIN — Medication 3 MILLILITER(S): at 20:13

## 2017-05-28 RX ADMIN — Medication 4 MILLIGRAM(S): at 06:18

## 2017-05-28 RX ADMIN — Medication 50 MILLIGRAM(S): at 05:22

## 2017-05-28 RX ADMIN — ERTAPENEM SODIUM 100 MILLIGRAM(S): 1 INJECTION, POWDER, LYOPHILIZED, FOR SOLUTION INTRAMUSCULAR; INTRAVENOUS at 17:38

## 2017-05-28 RX ADMIN — HEPARIN SODIUM 5000 UNIT(S): 5000 INJECTION INTRAVENOUS; SUBCUTANEOUS at 14:09

## 2017-05-28 RX ADMIN — Medication 100 MILLIGRAM(S): at 12:19

## 2017-05-28 RX ADMIN — Medication 3 MILLILITER(S): at 15:26

## 2017-05-28 RX ADMIN — Medication 20 MILLIGRAM(S): at 12:19

## 2017-05-28 RX ADMIN — Medication 50 MILLIGRAM(S): at 17:38

## 2017-05-28 RX ADMIN — SERTRALINE 50 MILLIGRAM(S): 25 TABLET, FILM COATED ORAL at 12:19

## 2017-05-28 NOTE — PROGRESS NOTE ADULT - SUBJECTIVE AND OBJECTIVE BOX
HPI/OVERNIGHT EVENTS: Patient seen and examined at bedside this morning, late chart entry. She reported feeling well this morning, no acute complaints. She denies any abdominal pain, nausea, or vomiting. Still stating that she has no appetite, but what she is eating she is tolerating without difficulty. Still awaiting physical therapy to assist patient with ambulation. Denies fever/chills/CP/SOB    MEDICATIONS  (STANDING):  heparin  Injectable 5000Unit(s) SubCutaneous every 8 hours  ertapenem  IVPB  IV Intermittent   ertapenem  IVPB 500milliGRAM(s) IV Intermittent every 24 hours  metoprolol 50milliGRAM(s) Oral two times a day  allopurinol 100milliGRAM(s) Oral daily  sertraline 50milliGRAM(s) Oral daily  ALBUTerol/ipratropium for Nebulization 3milliLiter(s) Nebulizer every 6 hours  ALBUTerol    90 MICROgram(s) HFA Inhaler 1Puff(s) Inhalation every 4 hours  furosemide    Tablet 20milliGRAM(s) Oral every other day    MEDICATIONS  (PRN):  ALBUTerol/ipratropium for Nebulization 3milliLiter(s) Nebulizer every 4 hours PRN additional wheezing      Vital Signs Last 24 Hrs  T(C): 36.8, Max: 36.8 (05-28 @ 12:22)  T(F): 98.2, Max: 98.2 (05-28 @ 12:22)  HR: 81 (64 - 90)  BP: 120/60 (120/60 - 142/50)  BP(mean): --  RR: 18 (18 - 18)  SpO2: 93% (89% - 95%)    Constitutional: patient resting comfortably in bed, in no acute distress  HEENT: EOMI / PERRL b/l, no active drainage or redness  Neck: No JVD  Respiratory: Breath Sounds CTA b/l, no accessory muscle use, no conversational dyspnea  Cardiovascular: Regular rate & rhythm, +S1, S2  Gastrointestinal: Abdomen soft, non-tender, non-distended, no rebound tenderness / guarding  Extremities: No peripheral edema of lower extremities b/l,  Vascular: 2+ peripheral pulses throughout  Neurological: GCS: 15 (4/5/6). A&O x 3; no gross sensory / motor / coordination deficits  Psychiatric: Normal mood, normal affect  Musculoskeletal: No joint pain, swelling or deformity; no limitation of movement  Skin: Warm & dry, no rashes      I&O's Detail    I & Os for current day (as of 28 May 2017 14:04)  =============================================  IN:    Total IN: 0 ml  ---------------------------------------------  OUT:    Voided: 450 ml    Total OUT: 450 ml  ---------------------------------------------  Total NET: -450 ml      LABS:                        10.0   17.8  )-----------( 398      ( 28 May 2017 05:56 )             30.7     05-28    146<H>  |  103  |  41.0<H>  ----------------------------<  120<H>  4.5   |  34.0<H>  |  0.71    Ca    8.8      28 May 2017 05:56  Phos  2.7     05-28  Mg     1.8     05-28 HPI/OVERNIGHT EVENTS: Patient seen and examined at bedside this morning, late chart entry. She reported feeling well this morning, no acute complaints. She denies any abdominal pain, nausea, or vomiting. Still stating that she has no appetite, but what she is eating she is tolerating without difficulty. Still awaiting physical therapy to assist patient with ambulation. Rcvd call from RN that patient refusing nasal cannula, O2 sats remain in low 90s on room air. Denies fever/chills/CP/SOB    MEDICATIONS  (STANDING):  heparin  Injectable 5000Unit(s) SubCutaneous every 8 hours  ertapenem  IVPB  IV Intermittent   ertapenem  IVPB 500milliGRAM(s) IV Intermittent every 24 hours  metoprolol 50milliGRAM(s) Oral two times a day  allopurinol 100milliGRAM(s) Oral daily  sertraline 50milliGRAM(s) Oral daily  ALBUTerol/ipratropium for Nebulization 3milliLiter(s) Nebulizer every 6 hours  ALBUTerol    90 MICROgram(s) HFA Inhaler 1Puff(s) Inhalation every 4 hours  furosemide    Tablet 20milliGRAM(s) Oral every other day    MEDICATIONS  (PRN):  ALBUTerol/ipratropium for Nebulization 3milliLiter(s) Nebulizer every 4 hours PRN additional wheezing      Vital Signs Last 24 Hrs  T(C): 36.8, Max: 36.8 (05-28 @ 12:22)  T(F): 98.2, Max: 98.2 (05-28 @ 12:22)  HR: 81 (64 - 90)  BP: 120/60 (120/60 - 142/50)  BP(mean): --  RR: 18 (18 - 18)  SpO2: 93% (89% - 95%)    Constitutional: patient resting comfortably in bed, in no acute distress  HEENT: EOMI / PERRL b/l, no active drainage or redness  Neck: No JVD  Respiratory: mild crackled @ lung bases, no accessory muscle use, no conversational dyspnea  Cardiovascular: Regular rate & rhythm, +S1, S2  Gastrointestinal: Abdomen soft, non-tender to palpation non-distended, no rebound tenderness / guarding  Extremities: No peripheral edema of lower extremities b/l  Vascular: 2+ peripheral pulses throughout  Neurological: GCS: 15 (4/5/6). A&O x 3; no gross sensory / motor / coordination deficits  Psychiatric: Normal mood, normal affect  Skin: Warm & dry, no rashes      I&O's Detail    I & Os for current day (as of 28 May 2017 14:04)  =============================================  IN:    Total IN: 0 ml  ---------------------------------------------  OUT:    Voided: 450 ml    Total OUT: 450 ml  ---------------------------------------------  Total NET: -450 ml      LABS:                        10.0   17.8  )-----------( 398      ( 28 May 2017 05:56 )             30.7     05-28    146<H>  |  103  |  41.0<H>  ----------------------------<  120<H>  4.5   |  34.0<H>  |  0.71    Ca    8.8      28 May 2017 05:56  Phos  2.7     05-28  Mg     1.8     05-28

## 2017-05-29 LAB
ALBUMIN SERPL ELPH-MCNC: 2.6 G/DL — LOW (ref 3.3–5.2)
ALP SERPL-CCNC: 267 U/L — HIGH (ref 40–120)
ALT FLD-CCNC: 28 U/L — SIGNIFICANT CHANGE UP
AMYLASE P1 CFR SERPL: 62 U/L — SIGNIFICANT CHANGE UP (ref 36–128)
ANION GAP SERPL CALC-SCNC: 10 MMOL/L — SIGNIFICANT CHANGE UP (ref 5–17)
ANISOCYTOSIS BLD QL: SLIGHT — SIGNIFICANT CHANGE UP
AST SERPL-CCNC: 27 U/L — SIGNIFICANT CHANGE UP
BILIRUB SERPL-MCNC: 1.2 MG/DL — SIGNIFICANT CHANGE UP (ref 0.4–2)
BUN SERPL-MCNC: 39 MG/DL — HIGH (ref 8–20)
CALCIUM SERPL-MCNC: 8.8 MG/DL — SIGNIFICANT CHANGE UP (ref 8.6–10.2)
CHLORIDE SERPL-SCNC: 100 MMOL/L — SIGNIFICANT CHANGE UP (ref 98–107)
CO2 SERPL-SCNC: 30 MMOL/L — HIGH (ref 22–29)
CREAT SERPL-MCNC: 0.67 MG/DL — SIGNIFICANT CHANGE UP (ref 0.5–1.3)
EOSINOPHIL NFR BLD AUTO: 4 % — SIGNIFICANT CHANGE UP (ref 0–5)
GLUCOSE SERPL-MCNC: 86 MG/DL — SIGNIFICANT CHANGE UP (ref 70–115)
HCT VFR BLD CALC: 32.5 % — LOW (ref 37–47)
HGB BLD-MCNC: 10.6 G/DL — LOW (ref 12–16)
HYPOCHROMIA BLD QL: SLIGHT — SIGNIFICANT CHANGE UP
LIDOCAIN IGE QN: 105 U/L — HIGH (ref 22–51)
LYMPHOCYTES # BLD AUTO: 5 % — LOW (ref 20–55)
MACROCYTES BLD QL: SLIGHT — SIGNIFICANT CHANGE UP
MAGNESIUM SERPL-MCNC: 1.7 MG/DL — SIGNIFICANT CHANGE UP (ref 1.6–2.6)
MCHC RBC-ENTMCNC: 32.2 PG — HIGH (ref 27–31)
MCHC RBC-ENTMCNC: 32.6 G/DL — SIGNIFICANT CHANGE UP (ref 32–36)
MCV RBC AUTO: 98.8 FL — SIGNIFICANT CHANGE UP (ref 81–99)
MICROCYTES BLD QL: SLIGHT — SIGNIFICANT CHANGE UP
MONOCYTES NFR BLD AUTO: 8 % — SIGNIFICANT CHANGE UP (ref 3–10)
MYELOCYTES NFR BLD: 1 % — HIGH (ref 0–0)
NEUTROPHILS NFR BLD AUTO: 78 % — HIGH (ref 37–73)
NEUTS BAND # BLD: 3 % — SIGNIFICANT CHANGE UP (ref 0–8)
OVALOCYTES BLD QL SMEAR: SLIGHT — SIGNIFICANT CHANGE UP
PHOSPHATE SERPL-MCNC: 2.7 MG/DL — SIGNIFICANT CHANGE UP (ref 2.4–4.7)
PLAT MORPH BLD: NORMAL — SIGNIFICANT CHANGE UP
PLATELET # BLD AUTO: 419 K/UL — HIGH (ref 150–400)
POIKILOCYTOSIS BLD QL AUTO: SLIGHT — SIGNIFICANT CHANGE UP
POTASSIUM SERPL-MCNC: 4.1 MMOL/L — SIGNIFICANT CHANGE UP (ref 3.5–5.3)
POTASSIUM SERPL-SCNC: 4.1 MMOL/L — SIGNIFICANT CHANGE UP (ref 3.5–5.3)
PROT SERPL-MCNC: 5.6 G/DL — LOW (ref 6.6–8.7)
RBC # BLD: 3.29 M/UL — LOW (ref 4.4–5.2)
RBC # FLD: 17.2 % — HIGH (ref 11–15.6)
RBC BLD AUTO: ABNORMAL
SODIUM SERPL-SCNC: 140 MMOL/L — SIGNIFICANT CHANGE UP (ref 135–145)
TARGETS BLD QL SMEAR: SLIGHT — SIGNIFICANT CHANGE UP
VARIANT LYMPHS # BLD: 1 % — SIGNIFICANT CHANGE UP (ref 0–6)
WBC # BLD: 17.9 K/UL — HIGH (ref 4.8–10.8)
WBC # FLD AUTO: 17.9 K/UL — HIGH (ref 4.8–10.8)

## 2017-05-29 RX ADMIN — Medication 3 MILLILITER(S): at 20:11

## 2017-05-29 RX ADMIN — Medication 3 MILLILITER(S): at 03:20

## 2017-05-29 RX ADMIN — Medication 50 MILLIGRAM(S): at 05:21

## 2017-05-29 RX ADMIN — HEPARIN SODIUM 5000 UNIT(S): 5000 INJECTION INTRAVENOUS; SUBCUTANEOUS at 05:21

## 2017-05-29 RX ADMIN — HEPARIN SODIUM 5000 UNIT(S): 5000 INJECTION INTRAVENOUS; SUBCUTANEOUS at 21:14

## 2017-05-29 RX ADMIN — Medication 3 MILLILITER(S): at 13:43

## 2017-05-29 RX ADMIN — Medication 3 MILLILITER(S): at 08:14

## 2017-05-29 RX ADMIN — Medication 50 MILLIGRAM(S): at 17:03

## 2017-05-29 RX ADMIN — ERTAPENEM SODIUM 100 MILLIGRAM(S): 1 INJECTION, POWDER, LYOPHILIZED, FOR SOLUTION INTRAMUSCULAR; INTRAVENOUS at 17:03

## 2017-05-29 RX ADMIN — Medication 100 MILLIGRAM(S): at 11:11

## 2017-05-29 RX ADMIN — SERTRALINE 50 MILLIGRAM(S): 25 TABLET, FILM COATED ORAL at 11:10

## 2017-05-29 RX ADMIN — HEPARIN SODIUM 5000 UNIT(S): 5000 INJECTION INTRAVENOUS; SUBCUTANEOUS at 13:20

## 2017-05-29 NOTE — PROGRESS NOTE ADULT - SUBJECTIVE AND OBJECTIVE BOX
patient seen and examined in AM  no acute issues overnight  patient reports feeling well  denies abdominal pain, N/V  reports decreased appetite     PE:  V/S: T 97.8, HR: 76, BP: 132/54, R: 18, SaO2: 98%  G/A: NAD  Abdomen: soft, non tender and non distended    CBC: 17.9 >10.6/32.5< 419    A/P: 89 y/o F admitted for severe gallstone pancreatitis - improving;   - pain control as needed  - monitor V/S   - incentive spirometry  - continue IV ABX; trend leukocytosis  - continue diet and supplements  - dvt ppx

## 2017-05-29 NOTE — PHYSICAL THERAPY INITIAL EVALUATION ADULT - ADDITIONAL COMMENTS
Pt lives with daughter in a split level house. There are 2 steps to enter with a HR and 5 steps to bedroom/bathroom with b/l HR. Pt owns a RW and a cane but rarely uses them. She also has a shower chair. She is home alone for part of the day.

## 2017-05-30 LAB
ANION GAP SERPL CALC-SCNC: 10 MMOL/L — SIGNIFICANT CHANGE UP (ref 5–17)
ANISOCYTOSIS BLD QL: SLIGHT — SIGNIFICANT CHANGE UP
BUN SERPL-MCNC: 33 MG/DL — HIGH (ref 8–20)
CALCIUM SERPL-MCNC: 8.6 MG/DL — SIGNIFICANT CHANGE UP (ref 8.6–10.2)
CHLORIDE SERPL-SCNC: 100 MMOL/L — SIGNIFICANT CHANGE UP (ref 98–107)
CO2 SERPL-SCNC: 27 MMOL/L — SIGNIFICANT CHANGE UP (ref 22–29)
CREAT SERPL-MCNC: 0.67 MG/DL — SIGNIFICANT CHANGE UP (ref 0.5–1.3)
EOSINOPHIL NFR BLD AUTO: 1 % — SIGNIFICANT CHANGE UP (ref 0–5)
GLUCOSE SERPL-MCNC: 90 MG/DL — SIGNIFICANT CHANGE UP (ref 70–115)
HCT VFR BLD CALC: 32.4 % — LOW (ref 37–47)
HGB BLD-MCNC: 10.7 G/DL — LOW (ref 12–16)
LYMPHOCYTES # BLD AUTO: 10 % — LOW (ref 20–55)
MACROCYTES BLD QL: SLIGHT — SIGNIFICANT CHANGE UP
MAGNESIUM SERPL-MCNC: 1.8 MG/DL — SIGNIFICANT CHANGE UP (ref 1.6–2.6)
MCHC RBC-ENTMCNC: 32.7 PG — HIGH (ref 27–31)
MCHC RBC-ENTMCNC: 33 G/DL — SIGNIFICANT CHANGE UP (ref 32–36)
MCV RBC AUTO: 99.1 FL — HIGH (ref 81–99)
MICROCYTES BLD QL: SLIGHT — SIGNIFICANT CHANGE UP
MONOCYTES NFR BLD AUTO: 10 % — SIGNIFICANT CHANGE UP (ref 3–10)
MYELOCYTES NFR BLD: 1 % — HIGH (ref 0–0)
NEUTROPHILS NFR BLD AUTO: 40 % — SIGNIFICANT CHANGE UP (ref 37–73)
NEUTS BAND # BLD: 1 % — SIGNIFICANT CHANGE UP (ref 0–8)
PHOSPHATE SERPL-MCNC: 3 MG/DL — SIGNIFICANT CHANGE UP (ref 2.4–4.7)
PLAT MORPH BLD: NORMAL — SIGNIFICANT CHANGE UP
PLATELET # BLD AUTO: 421 K/UL — HIGH (ref 150–400)
POIKILOCYTOSIS BLD QL AUTO: SLIGHT — SIGNIFICANT CHANGE UP
POTASSIUM SERPL-MCNC: 3.9 MMOL/L — SIGNIFICANT CHANGE UP (ref 3.5–5.3)
POTASSIUM SERPL-SCNC: 3.9 MMOL/L — SIGNIFICANT CHANGE UP (ref 3.5–5.3)
RBC # BLD: 3.27 M/UL — LOW (ref 4.4–5.2)
RBC # FLD: 16.8 % — HIGH (ref 11–15.6)
RBC BLD AUTO: ABNORMAL
SODIUM SERPL-SCNC: 137 MMOL/L — SIGNIFICANT CHANGE UP (ref 135–145)
VARIANT LYMPHS # BLD: 1 % — SIGNIFICANT CHANGE UP (ref 0–6)
WBC # BLD: 18.5 K/UL — HIGH (ref 4.8–10.8)
WBC # FLD AUTO: 18.5 K/UL — HIGH (ref 4.8–10.8)

## 2017-05-30 PROCEDURE — 99233 SBSQ HOSP IP/OBS HIGH 50: CPT

## 2017-05-30 RX ORDER — MAGNESIUM SULFATE 500 MG/ML
2 VIAL (ML) INJECTION ONCE
Qty: 0 | Refills: 0 | Status: COMPLETED | OUTPATIENT
Start: 2017-05-30 | End: 2017-05-30

## 2017-05-30 RX ORDER — LEVOTHYROXINE SODIUM 125 MCG
125 TABLET ORAL DAILY
Qty: 0 | Refills: 0 | Status: DISCONTINUED | OUTPATIENT
Start: 2017-05-30 | End: 2017-06-04

## 2017-05-30 RX ADMIN — Medication 50 MILLIGRAM(S): at 05:24

## 2017-05-30 RX ADMIN — HEPARIN SODIUM 5000 UNIT(S): 5000 INJECTION INTRAVENOUS; SUBCUTANEOUS at 11:53

## 2017-05-30 RX ADMIN — HEPARIN SODIUM 5000 UNIT(S): 5000 INJECTION INTRAVENOUS; SUBCUTANEOUS at 05:24

## 2017-05-30 RX ADMIN — Medication 3 MILLILITER(S): at 08:05

## 2017-05-30 RX ADMIN — Medication 125 MICROGRAM(S): at 20:44

## 2017-05-30 RX ADMIN — Medication 3 MILLILITER(S): at 03:25

## 2017-05-30 RX ADMIN — Medication 50 MILLIGRAM(S): at 16:49

## 2017-05-30 RX ADMIN — Medication 100 MILLIGRAM(S): at 11:52

## 2017-05-30 RX ADMIN — HEPARIN SODIUM 5000 UNIT(S): 5000 INJECTION INTRAVENOUS; SUBCUTANEOUS at 21:48

## 2017-05-30 RX ADMIN — SERTRALINE 50 MILLIGRAM(S): 25 TABLET, FILM COATED ORAL at 11:52

## 2017-05-30 RX ADMIN — Medication 3 MILLILITER(S): at 20:22

## 2017-05-30 RX ADMIN — ERTAPENEM SODIUM 100 MILLIGRAM(S): 1 INJECTION, POWDER, LYOPHILIZED, FOR SOLUTION INTRAMUSCULAR; INTRAVENOUS at 16:48

## 2017-05-30 RX ADMIN — Medication 20 MILLIGRAM(S): at 11:52

## 2017-05-30 RX ADMIN — Medication 50 GRAM(S): at 16:48

## 2017-05-30 NOTE — PROGRESS NOTE ADULT - SUBJECTIVE AND OBJECTIVE BOX
SUBJECTIVE/24 hour events:  Patient is a 89yFemale with resolving gallstone pancreatitis, long talk with the family regarding expectations of disposition and rehab, PM&R consult placed. Had some issues with delerium which also we explained is a result of an extended hospital stay and advanced age, will look into TSH levels in am 5/31      Vital Signs Last 24 Hrs  T(C): 37.1, Max: 37.1 (05-30 @ 20:55)  T(F): 98.7, Max: 98.7 (05-30 @ 20:55)  HR: 72 (72 - 87)  BP: 112/58 (110/60 - 144/67)  BP(mean): --  RR: 18 (18 - 20)  SpO2: 97% (96% - 98%)  Drug Dosing Weight  Height (cm): 152.4 (20 May 2017 06:20)  Weight (kg): 50.8 (20 May 2017 06:20)  BMI (kg/m2): 21.9 (20 May 2017 06:20)  BSA (m2): 1.46 (20 May 2017 06:20)  I&O's Detail  I & Os for 24h ending 30 May 2017 07:00  =============================================  IN:    Total IN: 0 ml  ---------------------------------------------  OUT:    Voided: 550 ml    Total OUT: 550 ml  ---------------------------------------------  Total NET: -550 ml    I & Os for current day (as of 30 May 2017 21:12)  =============================================  IN:    Oral Fluid: 240 ml    Total IN: 240 ml  ---------------------------------------------  OUT:    Total OUT: 0 ml  ---------------------------------------------  Total NET: 240 ml    Allergies    penicillins (Unknown)    Intolerances                              10.7   18.5  )-----------( 421      ( 30 May 2017 07:31 )             32.4   05-30    137  |  100  |  33.0<H>  ----------------------------<  90  3.9   |  27.0  |  0.67    Ca    8.6      30 May 2017 07:31  Phos  3.0     05-30  Mg     1.8     05-30    TPro  5.6<L>  /  Alb  2.6<L>  /  TBili  1.2  /  DBili  x   /  AST  27  /  ALT  28  /  AlkPhos  267<H>  05-29      ROS:    PHYSICAL EXAM:  Constitutional: conversational , admits to being confused at times    Respiratory: CTAB, respiratory status much improved from last week     Cardiovascular: HR NSR, VS wnl     Gastrointestinal: abdomen soft, non-tender, non-distended, tolerating diet , appetite waxes/wanes but drinking ensures, on invanz will discuss final end date, wbc continues to be elevated but as discussed at bedside likely more to inflammation than infection because patient is clinically well other than leukocytosis no other signs of infection    Genitourinary: voiding , bun and creatinine improved from last week    Extremities: moves all extremities,      Neurological: increased confusion with seeing people and items that aren't there, patient is directable more likely delerium from extended hospital than infection.     Skin: warm, dry and no rashes          MEDICATIONS  (STANDING):  heparin  Injectable 5000Unit(s) SubCutaneous every 8 hours  ertapenem  IVPB  IV Intermittent   ertapenem  IVPB 500milliGRAM(s) IV Intermittent every 24 hours  metoprolol 50milliGRAM(s) Oral two times a day  allopurinol 100milliGRAM(s) Oral daily  sertraline 50milliGRAM(s) Oral daily  ALBUTerol/ipratropium for Nebulization 3milliLiter(s) Nebulizer every 6 hours  ALBUTerol    90 MICROgram(s) HFA Inhaler 1Puff(s) Inhalation every 4 hours  furosemide    Tablet 20milliGRAM(s) Oral every other day  levothyroxine 125MICROGram(s) Oral daily    MEDICATIONS  (PRN):  ALBUTerol/ipratropium for Nebulization 3milliLiter(s) Nebulizer every 4 hours PRN additional wheezing      RADIOLOGY STUDIES:    CULTURES:

## 2017-05-31 LAB
ANION GAP SERPL CALC-SCNC: 12 MMOL/L — SIGNIFICANT CHANGE UP (ref 5–17)
ANISOCYTOSIS BLD QL: SLIGHT — SIGNIFICANT CHANGE UP
BUN SERPL-MCNC: 32 MG/DL — HIGH (ref 8–20)
CALCIUM SERPL-MCNC: 9 MG/DL — SIGNIFICANT CHANGE UP (ref 8.6–10.2)
CHLORIDE SERPL-SCNC: 101 MMOL/L — SIGNIFICANT CHANGE UP (ref 98–107)
CO2 SERPL-SCNC: 27 MMOL/L — SIGNIFICANT CHANGE UP (ref 22–29)
CREAT SERPL-MCNC: 0.78 MG/DL — SIGNIFICANT CHANGE UP (ref 0.5–1.3)
GLUCOSE SERPL-MCNC: 106 MG/DL — SIGNIFICANT CHANGE UP (ref 70–115)
HCT VFR BLD CALC: 35 % — LOW (ref 37–47)
HGB BLD-MCNC: 11.6 G/DL — LOW (ref 12–16)
LYMPHOCYTES # BLD AUTO: 10 % — LOW (ref 20–55)
MACROCYTES BLD QL: SLIGHT — SIGNIFICANT CHANGE UP
MAGNESIUM SERPL-MCNC: 2.4 MG/DL — SIGNIFICANT CHANGE UP (ref 1.6–2.6)
MCHC RBC-ENTMCNC: 32.9 PG — HIGH (ref 27–31)
MCHC RBC-ENTMCNC: 33.1 G/DL — SIGNIFICANT CHANGE UP (ref 32–36)
MCV RBC AUTO: 99.2 FL — HIGH (ref 81–99)
METAMYELOCYTES # FLD: 1 % — HIGH (ref 0–0)
MICROCYTES BLD QL: SLIGHT — SIGNIFICANT CHANGE UP
MONOCYTES NFR BLD AUTO: 8 % — SIGNIFICANT CHANGE UP (ref 3–10)
MYELOCYTES NFR BLD: 2 % — HIGH (ref 0–0)
NEUTROPHILS NFR BLD AUTO: 77 % — HIGH (ref 37–73)
NEUTS BAND # BLD: 1 % — SIGNIFICANT CHANGE UP (ref 0–8)
NEUTS HYPERSEG # BLD: PRESENT — SIGNIFICANT CHANGE UP
OVALOCYTES BLD QL SMEAR: SLIGHT — SIGNIFICANT CHANGE UP
PLAT MORPH BLD: ABNORMAL
PLATELET # BLD AUTO: 508 K/UL — HIGH (ref 150–400)
POIKILOCYTOSIS BLD QL AUTO: SLIGHT — SIGNIFICANT CHANGE UP
POTASSIUM SERPL-MCNC: 3.7 MMOL/L — SIGNIFICANT CHANGE UP (ref 3.5–5.3)
POTASSIUM SERPL-SCNC: 3.7 MMOL/L — SIGNIFICANT CHANGE UP (ref 3.5–5.3)
RBC # BLD: 3.53 M/UL — LOW (ref 4.4–5.2)
RBC # FLD: 16.9 % — HIGH (ref 11–15.6)
RBC BLD AUTO: ABNORMAL
SODIUM SERPL-SCNC: 140 MMOL/L — SIGNIFICANT CHANGE UP (ref 135–145)
T3 SERPL-MCNC: 37 NG/DL — LOW (ref 80–200)
T4 AB SER-ACNC: 2.8 UG/DL — LOW (ref 4.5–12)
TARGETS BLD QL SMEAR: SLIGHT — SIGNIFICANT CHANGE UP
TOXIC GRANULES BLD QL SMEAR: PRESENT — SIGNIFICANT CHANGE UP
TSH SERPL-MCNC: 38.01 UIU/ML — HIGH (ref 0.27–4.2)
VARIANT LYMPHS # BLD: 1 % — SIGNIFICANT CHANGE UP (ref 0–6)
WBC # BLD: 19.2 K/UL — HIGH (ref 4.8–10.8)
WBC # FLD AUTO: 19.2 K/UL — HIGH (ref 4.8–10.8)

## 2017-05-31 PROCEDURE — 99233 SBSQ HOSP IP/OBS HIGH 50: CPT

## 2017-05-31 PROCEDURE — 99222 1ST HOSP IP/OBS MODERATE 55: CPT | Mod: GC

## 2017-05-31 RX ORDER — OXYCODONE HYDROCHLORIDE 5 MG/1
2.5 TABLET ORAL
Qty: 0 | Refills: 0 | Status: DISCONTINUED | OUTPATIENT
Start: 2017-05-31 | End: 2017-06-04

## 2017-05-31 RX ORDER — HALOPERIDOL DECANOATE 100 MG/ML
2.5 INJECTION INTRAMUSCULAR ONCE
Qty: 0 | Refills: 0 | Status: DISCONTINUED | OUTPATIENT
Start: 2017-05-31 | End: 2017-06-04

## 2017-05-31 RX ORDER — ACETAMINOPHEN 500 MG
500 TABLET ORAL ONCE
Qty: 0 | Refills: 0 | Status: COMPLETED | OUTPATIENT
Start: 2017-05-31 | End: 2017-05-31

## 2017-05-31 RX ORDER — POTASSIUM CHLORIDE 20 MEQ
10 PACKET (EA) ORAL ONCE
Qty: 0 | Refills: 0 | Status: COMPLETED | OUTPATIENT
Start: 2017-05-31 | End: 2017-05-31

## 2017-05-31 RX ADMIN — Medication 100 MILLIGRAM(S): at 13:45

## 2017-05-31 RX ADMIN — HEPARIN SODIUM 5000 UNIT(S): 5000 INJECTION INTRAVENOUS; SUBCUTANEOUS at 20:46

## 2017-05-31 RX ADMIN — Medication 50 MILLIGRAM(S): at 20:45

## 2017-05-31 RX ADMIN — Medication 200 MILLIGRAM(S): at 23:25

## 2017-05-31 RX ADMIN — ERTAPENEM SODIUM 100 MILLIGRAM(S): 1 INJECTION, POWDER, LYOPHILIZED, FOR SOLUTION INTRAMUSCULAR; INTRAVENOUS at 18:13

## 2017-05-31 RX ADMIN — Medication 100 MILLIEQUIVALENT(S): at 20:45

## 2017-05-31 RX ADMIN — Medication 50 MILLIGRAM(S): at 06:37

## 2017-05-31 RX ADMIN — Medication 125 MICROGRAM(S): at 06:37

## 2017-05-31 RX ADMIN — SERTRALINE 50 MILLIGRAM(S): 25 TABLET, FILM COATED ORAL at 13:44

## 2017-05-31 RX ADMIN — Medication 3 MILLILITER(S): at 08:28

## 2017-05-31 RX ADMIN — Medication 3 MILLILITER(S): at 20:43

## 2017-05-31 RX ADMIN — Medication 3 MILLILITER(S): at 15:24

## 2017-05-31 NOTE — CONSULT NOTE ADULT - ATTENDING COMMENTS
Agree with resident. 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 Patient does not meet acute rehab criteria. Recommend [facility].
Thank you for the opportunity to assist with the care of this patient.   [facility] [phone].

## 2017-05-31 NOTE — PROGRESS NOTE ADULT - SUBJECTIVE AND OBJECTIVE BOX
INTERVAL HPI/OVERNIGHT EVENTS: 89 year old female with resolving gallstone pancreatitis. Patient had epistaxis last night, has since resolved. Patient and family continue to request non-operative management. Patient was examined at bedside, and discussion was had with family about keeping the patient comfortable and their decision not to do invasive tests. Patient is experiencing acute delirium, with auditory and tactile hallucinations. Patient offers no complaints at this time. Patient's appetite waxes and wanes, but she continues to drink Ensure. Denies chest pain, sob, headache, nausea, vomiting or diarrhea      SUBJECTIVE:    Pain (0-10):            Pain Control Adequate: [x ] YES [ ] NO  Nausea: [ ] YES [x ] NO            Vomiting: [ ] YES [x ] NO  Diarrhea: [ ] YES [ x] NO         Constipation: [ ] YES [x ] NO     Chest Pain: [ ] YES [ x] NO    SOB:  [ ] YES [x ] NO    MEDICATIONS  (STANDING):  heparin  Injectable 5000Unit(s) SubCutaneous every 8 hours  ertapenem  IVPB  IV Intermittent   ertapenem  IVPB 500milliGRAM(s) IV Intermittent every 24 hours  metoprolol 50milliGRAM(s) Oral two times a day  allopurinol 100milliGRAM(s) Oral daily  sertraline 50milliGRAM(s) Oral daily  ALBUTerol/ipratropium for Nebulization 3milliLiter(s) Nebulizer every 6 hours  ALBUTerol    90 MICROgram(s) HFA Inhaler 1Puff(s) Inhalation every 4 hours  furosemide    Tablet 20milliGRAM(s) Oral every other day  levothyroxine 125MICROGram(s) Oral daily  haloperidol    Injectable 2.5milliGRAM(s) IntraMuscular once    MEDICATIONS  (PRN):  ALBUTerol/ipratropium for Nebulization 3milliLiter(s) Nebulizer every 4 hours PRN additional wheezing      Vital Signs Last 24 Hrs  T(C): 36.7, Max: 37.1 (05-30 @ 20:55)  T(F): 98, Max: 98.7 (05-30 @ 20:55)  HR: 76 (70 - 80)  BP: 128/62 (112/58 - 144/67)  BP(mean): --  RR: 18 (18 - 20)  SpO2: 96% (96% - 98%)    PHYSICAL EXAM:      Constitutional: NAD, frail    Eyes: EOMI    Respiratory: breathing comfortably on nasal cannula    Cardiovascular: RRR    Gastrointestinal: abdomen soft, non-distended, no TTP with deep or superficial palpation    Genitourinary: voiding, BHUMI improved    Extremities: moving all 4 extremities    Neurological: A&O X 2    Psychiatric: auditory and tactile hallucinations        I&O's Detail    I & Os for current day (as of 31 May 2017 13:01)  =============================================  IN:    Oral Fluid: 240 ml    Total IN: 240 ml  ---------------------------------------------  OUT:    Total OUT: 0 ml  ---------------------------------------------  Total NET: 240 ml      LABS:                        11.6   19.2  )-----------( 508      ( 31 May 2017 07:24 )             35.0     05-31    140  |  101  |  32.0<H>  ----------------------------<  106  3.7   |  27.0  |  0.78    Ca    9.0      31 May 2017 07:24  Phos  3.0     05-30  Mg     2.4     05-31            RADIOLOGY & ADDITIONAL STUDIES:

## 2017-05-31 NOTE — PROGRESS NOTE ADULT - ASSESSMENT
89 year old female with resolving gallstone pancreatitis  - optimize pain control  - continue IV abx  - continue Synthroid  - awaiting PM&R consult  - attempt to improve sleep with Melatonin and no vital checks between 10 pm and 6 am  plan as per

## 2017-06-01 RX ADMIN — Medication 125 MICROGRAM(S): at 06:30

## 2017-06-01 RX ADMIN — Medication 3 MILLILITER(S): at 08:16

## 2017-06-01 RX ADMIN — Medication 3 MILLILITER(S): at 14:47

## 2017-06-01 RX ADMIN — Medication 50 MILLIGRAM(S): at 06:30

## 2017-06-01 RX ADMIN — ERTAPENEM SODIUM 100 MILLIGRAM(S): 1 INJECTION, POWDER, LYOPHILIZED, FOR SOLUTION INTRAMUSCULAR; INTRAVENOUS at 20:24

## 2017-06-01 RX ADMIN — Medication 500 MILLIGRAM(S): at 00:26

## 2017-06-01 RX ADMIN — HEPARIN SODIUM 5000 UNIT(S): 5000 INJECTION INTRAVENOUS; SUBCUTANEOUS at 06:30

## 2017-06-01 RX ADMIN — HEPARIN SODIUM 5000 UNIT(S): 5000 INJECTION INTRAVENOUS; SUBCUTANEOUS at 20:25

## 2017-06-02 RX ADMIN — ERTAPENEM SODIUM 100 MILLIGRAM(S): 1 INJECTION, POWDER, LYOPHILIZED, FOR SOLUTION INTRAMUSCULAR; INTRAVENOUS at 18:25

## 2017-06-02 RX ADMIN — Medication 125 MICROGRAM(S): at 06:22

## 2017-06-02 RX ADMIN — Medication 3 MILLILITER(S): at 20:07

## 2017-06-02 RX ADMIN — Medication 50 MILLIGRAM(S): at 18:22

## 2017-06-02 RX ADMIN — Medication 50 MILLIGRAM(S): at 06:22

## 2017-06-02 RX ADMIN — HEPARIN SODIUM 5000 UNIT(S): 5000 INJECTION INTRAVENOUS; SUBCUTANEOUS at 05:27

## 2017-06-02 RX ADMIN — Medication 3 MILLILITER(S): at 07:51

## 2017-06-02 RX ADMIN — HEPARIN SODIUM 5000 UNIT(S): 5000 INJECTION INTRAVENOUS; SUBCUTANEOUS at 14:01

## 2017-06-02 RX ADMIN — Medication 3 MILLILITER(S): at 14:57

## 2017-06-02 RX ADMIN — HEPARIN SODIUM 5000 UNIT(S): 5000 INJECTION INTRAVENOUS; SUBCUTANEOUS at 21:09

## 2017-06-02 RX ADMIN — Medication 100 MILLIGRAM(S): at 14:00

## 2017-06-02 RX ADMIN — SERTRALINE 50 MILLIGRAM(S): 25 TABLET, FILM COATED ORAL at 14:00

## 2017-06-02 NOTE — PROGRESS NOTE ADULT - ASSESSMENT
89 year old female with resolving gallstone pancreatitis.  Resolving.  Last dose abx 6/3  - optimize pain control  - continue IV abx  - continue Synthroid  - To home with hospice 6/4

## 2017-06-02 NOTE — PROGRESS NOTE ADULT - SUBJECTIVE AND OBJECTIVE BOX
INTERVAL HPI/OVERNIGHT EVENTS: 89 year old female with resolving gallstone pancreatitis. No acute events overnight. Patient is experiencing acute delirium, with auditory and tactile hallucinations. Patient offers no complaints at this time. Patient's appetite waxes and wanes, but she continues to drink Ensure. Denies chest pain, sob, headache, nausea, vomiting or diarrhea      SUBJECTIVE:    Pain (0-10):            Pain Control Adequate: [x ] YES [ ] NO  Nausea: [ ] YES [x ] NO            Vomiting: [ ] YES [x ] NO  Diarrhea: [ ] YES [ x] NO         Constipation: [ ] YES [x ] NO     Chest Pain: [ ] YES [ x] NO    SOB:  [ ] YES [x ] NO      MEDICATIONS  (STANDING):  heparin  Injectable 5000Unit(s) SubCutaneous every 8 hours  ertapenem  IVPB  IV Intermittent   ertapenem  IVPB 500milliGRAM(s) IV Intermittent every 24 hours  metoprolol 50milliGRAM(s) Oral two times a day  allopurinol 100milliGRAM(s) Oral daily  sertraline 50milliGRAM(s) Oral daily  ALBUTerol/ipratropium for Nebulization 3milliLiter(s) Nebulizer every 6 hours  ALBUTerol    90 MICROgram(s) HFA Inhaler 1Puff(s) Inhalation every 4 hours  furosemide    Tablet 20milliGRAM(s) Oral every other day  levothyroxine 125MICROGram(s) Oral daily  haloperidol    Injectable 2.5milliGRAM(s) IntraMuscular once    MEDICATIONS  (PRN):  ALBUTerol/ipratropium for Nebulization 3milliLiter(s) Nebulizer every 4 hours PRN additional wheezing  oxyCODONE IR 2.5milliGRAM(s) Oral four times a day PRN Moderate Pain (4 - 6)      Vital Signs Last 24 Hrs  T(C): 36.6, Max: 36.9 (06-01 @ 20:53)  T(F): 97.9, Max: 98.4 (06-01 @ 20:53)  HR: 75 (70 - 86)  BP: 100/60 (94/50 - 110/42)  BP(mean): --  RR: 18 (16 - 18)  SpO2: 97% (97% - 99%)    PHYSICAL EXAM:    Constitutional: NAD, frail appearing    Eyes: EOMI    Respiratory: breathing comfortably on NC    Cardiovascular: RRR    Gastrointestinal: abdomen soft, ND, NT    Extremities: WDWP, nonedematous    Neurological: A&O X 2      I&O's Detail    I & Os for current day (as of 02 Jun 2017 09:03)  =============================================  IN:    Solution: 50 ml    Total IN: 50 ml  ---------------------------------------------  OUT:    Voided: 250 ml    Total OUT: 250 ml  ---------------------------------------------  Total NET: -200 ml      LABS:                RADIOLOGY & ADDITIONAL STUDIES:

## 2017-06-02 NOTE — PROGRESS NOTE ADULT - ATTENDING COMMENTS
will complete course of iv abx on 6/3. planning for [facility] on sunday. plan of care was discussed with the family/patient and they agreed.
Patient seen and examined on ICU rounds at around 930am.  At that time she was off pressors, awake and no toxic appearing.  denying any pain with a benign abdominal exam and no peritonitis.  Patient and family explained the situation and the likelihood of need for biliary decompression.  Patient does not want any invasive procedures and would like to limit her care to medical therapy.  She expressed understanding of the situation as did her daughter (health care proxy)  She also expressed previous and continues wishes to be DO NOT RESUSCITATE.  Transfer to floor wit palliative care consult.
agree with all several long d/w family. Pts sepsis has resolved may still have some pain. Has had some delirium. SaO2 ok, leukocytosis persists, no tenderness on physical exam. Continue to manage with antibiotics alone, likely treating cholangitis based upon presentation. Patient family want NO surgical intervention.

## 2017-06-03 RX ADMIN — Medication 3 MILLILITER(S): at 15:06

## 2017-06-03 RX ADMIN — Medication 50 MILLIGRAM(S): at 17:34

## 2017-06-03 RX ADMIN — SERTRALINE 50 MILLIGRAM(S): 25 TABLET, FILM COATED ORAL at 11:35

## 2017-06-03 RX ADMIN — HEPARIN SODIUM 5000 UNIT(S): 5000 INJECTION INTRAVENOUS; SUBCUTANEOUS at 05:18

## 2017-06-03 RX ADMIN — Medication 125 MICROGRAM(S): at 05:18

## 2017-06-03 RX ADMIN — Medication 100 MILLIGRAM(S): at 11:35

## 2017-06-03 RX ADMIN — HEPARIN SODIUM 5000 UNIT(S): 5000 INJECTION INTRAVENOUS; SUBCUTANEOUS at 13:29

## 2017-06-03 RX ADMIN — HEPARIN SODIUM 5000 UNIT(S): 5000 INJECTION INTRAVENOUS; SUBCUTANEOUS at 21:14

## 2017-06-03 RX ADMIN — Medication 3 MILLILITER(S): at 08:01

## 2017-06-03 RX ADMIN — Medication 50 MILLIGRAM(S): at 05:18

## 2017-06-03 NOTE — PROGRESS NOTE ADULT - SUBJECTIVE AND OBJECTIVE BOX
INTERVAL HPI/OVERNIGHT EVENTS: Patient seen and evaluated at bedside. No acute overnight events. Pain well controlled, NO N/F/V/C    MEDICATIONS  (STANDING):  heparin  Injectable 5000Unit(s) SubCutaneous every 8 hours  ertapenem  IVPB  IV Intermittent   ertapenem  IVPB 500milliGRAM(s) IV Intermittent every 24 hours  metoprolol 50milliGRAM(s) Oral two times a day  allopurinol 100milliGRAM(s) Oral daily  sertraline 50milliGRAM(s) Oral daily  ALBUTerol/ipratropium for Nebulization 3milliLiter(s) Nebulizer every 6 hours  ALBUTerol    90 MICROgram(s) HFA Inhaler 1Puff(s) Inhalation every 4 hours  furosemide    Tablet 20milliGRAM(s) Oral every other day  levothyroxine 125MICROGram(s) Oral daily  haloperidol    Injectable 2.5milliGRAM(s) IntraMuscular once    MEDICATIONS  (PRN):  ALBUTerol/ipratropium for Nebulization 3milliLiter(s) Nebulizer every 4 hours PRN additional wheezing  oxyCODONE IR 2.5milliGRAM(s) Oral four times a day PRN Moderate Pain (4 - 6)      Vital Signs Last 24 Hrs  T(C): 36.5, Max: 36.8 (06-02 @ 16:13)  T(F): 97.7, Max: 98.3 (06-02 @ 16:13)  HR: 68 (68 - 120)  BP: 118/50 (94/40 - 120/56)  BP(mean): --  RR: 15 (15 - 18)  SpO2: 100% (99% - 100%)    Physical Exam:    Neurological:  No sensory/motor deficits    HEENT: PERRLA, EOMI, no drainage or redness    Neck: No bruits; no thyromegaly or nodules,  No JVD    Back: Normal spine flexure, No CVA tenderness, No deformity or limitation of movement    Respiratory: Breath Sounds equal & clear to auscultation, no accessory muscle use    Cardiovascular: Regular rate & rhythm, normal S1, S2; no murmurs, gallops or rubs    Gastrointestinal: Soft, non-tender, normal bowel sounds    Extremities: No peripheral edema, No cyanosis, clubbing     Vascular: Equal and normal pulses: 2+ peripheral pulses throughout    Musculoskeletal: No joint pain, swelling or deformity; no limitation of movement    Skin: No rashes      I&O's Detail  I & Os for 24h ending 03 Jun 2017 07:00  =============================================  IN:    Oral Fluid: 240 ml    Total IN: 240 ml  ---------------------------------------------  OUT:    Voided: 250 ml    Total OUT: 250 ml  ---------------------------------------------  Total NET: -10 ml    I & Os for current day (as of 03 Jun 2017 09:06)  =============================================  IN:    Total IN: 0 ml  ---------------------------------------------  OUT:    Voided: 200 ml    Total OUT: 200 ml  ---------------------------------------------  Total NET: -200 ml      LABS:                RADIOLOGY & ADDITIONAL STUDIES:

## 2017-06-04 VITALS
TEMPERATURE: 98 F | DIASTOLIC BLOOD PRESSURE: 56 MMHG | HEART RATE: 82 BPM | SYSTOLIC BLOOD PRESSURE: 108 MMHG | OXYGEN SATURATION: 100 % | RESPIRATION RATE: 16 BRPM

## 2017-06-04 RX ORDER — METOPROLOL TARTRATE 50 MG
1 TABLET ORAL
Qty: 0 | Refills: 0 | COMMUNITY

## 2017-06-04 RX ORDER — METOPROLOL TARTRATE 50 MG
0 TABLET ORAL
Qty: 0 | Refills: 0 | COMMUNITY

## 2017-06-04 RX ADMIN — Medication 100 MILLIGRAM(S): at 11:43

## 2017-06-04 RX ADMIN — Medication 50 MILLIGRAM(S): at 05:23

## 2017-06-04 RX ADMIN — SERTRALINE 50 MILLIGRAM(S): 25 TABLET, FILM COATED ORAL at 11:43

## 2017-06-04 RX ADMIN — HEPARIN SODIUM 5000 UNIT(S): 5000 INJECTION INTRAVENOUS; SUBCUTANEOUS at 05:23

## 2017-06-04 RX ADMIN — Medication 3 MILLILITER(S): at 08:08

## 2017-06-04 RX ADMIN — Medication 125 MICROGRAM(S): at 05:23

## 2017-06-04 NOTE — PROGRESS NOTE ADULT - PROBLEM SELECTOR PLAN 1
- continue antibiotics  - awaiting PT consult   - prn analgesia  - regular diet as tolerated   - monitor resp status  - DVT prophylaxis  -OOB to chair  -serial exams and laboratory testing  - plan discussed with attending
Patient remains on IV abx, will discuss end date, monitor leukocystosis which we discussed with the family is like more inflammation then infection due to patients clinical status. PM&R to  evaluate for dispo. Had long discussion with the family at bedside with Dr. Shepard regarding expectations of patients dispo and her ability to return to her activities of daily living prior to admission. Once PM&R weighs in we will able to provide a better picture and understanding to the family.
continue invanz, bacteremia, urosepsis resolving, mild increase in WBC , will continue to monitor CBC, monitor abdomen, monitor BF, monitor VS for any s/s of increasing infection. DVT ppx, encourage PO intake
- Continue Invanz  - Monitor WBC count  - Pain control PRN  - Encourage PO intake - especially ensures if appetite is poor  - Encourage incentive nandini and duonebs for pulm toileting  - Suppl O2 PRN  - DVT ppx with heparin SQ  - Physical therapy ordered to assist with ambulation - will f/u their recommendations
- WBC remains at 17.8 today, not significantly changed from yesterday. Continue Invanz, f/u AM CBC  - LFTs / amylase & lipase ordered for AM  - Regular diet - re-emphasize importance of ensure supplementation if diet is poor  - Patient has required intermittent doses of lasix over the past week - ordered 20 lasix PO every other day, will monitor K+  - Duonebs PRN for wheezing  - Encourage incentive spirometer use for pulm toileting  - Will reach out to physical therapy to assist patient in being more mobile  - DVT ppx with subQ heparin
- continue antibiotics  - f/u palliative care reccs  - prn analgesia  - regular diet as tolerated   - monitor resp status  - DVT proph  -OOB to chair  -serial exams and laboratory testing
-plan for discharge to [facility] today  -continue regular diet  -monitor pulmonary status  -dvt ppx

## 2017-06-04 NOTE — DISCHARGE NOTE ADULT - SECONDARY DIAGNOSIS.
Bacteremia due to Escherichia coli Cholangitis due to bile duct calculus with obstruction Septic shock Acute kidney injury

## 2017-06-04 NOTE — DISCHARGE NOTE ADULT - PLAN OF CARE
alleviation of pain and symptoms, prevention of recurrence or complication BATHING:  You may shower and/or sponge bathe.   ACTIVITY: No heavy lifting or straining. Otherwise, you may return to your usual level of physical activity. If you are taking narcotic pain medication (such as Percocet) DO NOT drive a car, operate machinery or make important decisions.  DIET: Return to your usual diet.  NOTIFY YOUR SURGEON IF: You have any bleeding that does not stop, any pus draining from your wound(s), any fever (over 100.4 F) or chills, persistent nausea/vomiting, persistent diarrhea, or if your pain is not controlled on your discharge pain medications.  FOLLOW-UP: Please follow up with your primary care physician and Acute Care Surgery clinic [phone] in 10-14 days regarding your hospitalization. Call for appointment upon discharge.

## 2017-06-04 NOTE — DISCHARGE NOTE ADULT - HOSPITAL COURSE
HPI: Pt presents to [facility] ER s/p fall at home.  She was unable to get up and utilized her life alert. Daughter came to the patient's aid and brought her to [facility]. She admits to a history of abdominal pain but cannot recall when it began. Offers no other complaints.    5/20-21:  Pt admitted with gallstone pancreatitis.  Given total 7L total IVF during the day.  MAP's soft and no longer fluid responsive.  SVO2 64.   Increasing pressor requirements, Vaso started.  Solumedrol given.  Able to ½ Levo dose, UOP improved.    Patient does not want any invasive procedures and would like to limit her care to medical therapy. She expressed understanding of the situation as did her daughter (health care proxy) She also expressed previous and continues wishes to be DO NOT RESUSCITATE. Transfer to floor wit palliative care consult.    5/22: Team met with patient and her family, two daughters, son in law, and granddaughter. They understand that she is doing a bit better now, but that there is a high likelihood that she can decompensate again without definitive treatment (cholecystectomy)    5/23: Patient transferred to floor from ICU. DNR/DNI, no surgical intervention per patient and family. Patient seen by paliiative care. Patient has mild RUQ TTP.    5/24: Pt doing well, WBC count trending down. DNR/DNI, no surgical intervention per patient and family. Patient seen by palliative care. Tolerating regular diet. Scott removed, passed TOV.    5/25: Tolerating diet, leukocytosis trending down, continues on Ertapenem, labs consistent with passing of stone, discussed with family that we shall continue to finish at least 10 days of antibiotics and since her labs suggest passing of stone will asses off antibiotic upon completion of course.    5/26: States she does not have an appetite but has been tolerating her ensures. Having bowel function and voiding    5/27: She denies any abdominal pain, nausea, or vomiting. Still stating that she has no appetite, but what she is eating she is tolerating without difficulty. Still awaiting physical therapy to assist patient with ambulation. Rcvd call from RN that patient refusing nasal cannula, O2 sats remain in low 90s on room air. Denies fever/chills/CP/SOB. HPI: Pt presents to [facility] ER s/p fall at home.  She was unable to get up and utilized her life alert. Daughter came to the patient's aid and brought her to [facility]. She admits to a history of abdominal pain but cannot recall when it began. Offers no other complaints.    5/20-21:  Pt admitted with gallstone pancreatitis.  Given total 7L total IVF during the day.  MAP's soft and no longer fluid responsive.  SVO2 64.   Increasing pressor requirements, Vaso started.  Solumedrol given.  Able to ½ Levo dose, UOP improved.    Patient does not want any invasive procedures and would like to limit her care to medical therapy. She expressed understanding of the situation as did her daughter (health care proxy) She also expressed previous and continues wishes to be DO NOT RESUSCITATE. Transfer to floor wit palliative care consult.    5/22: Team met with patient and her family, two daughters, son in law, and granddaughter. They understand that she is doing a bit better now, but that there is a high likelihood that she can decompensate again without definitive treatment (cholecystectomy)    5/23: Patient transferred to floor from ICU. DNR/DNI, no surgical intervention per patient and family. Patient seen by paliiative care. Patient has mild RUQ TTP.    5/24: Pt doing well, WBC count trending down. DNR/DNI, no surgical intervention per patient and family. Patient seen by palliative care. Tolerating regular diet. Scott removed, passed TOV.    5/25: Tolerating diet, leukocytosis trending down, continues on Ertapenem, labs consistent with passing of stone, discussed with family that we shall continue to finish at least 10 days of antibiotics and since her labs suggest passing of stone will asses off antibiotic upon completion of course.    5/26: States she does not have an appetite but has been tolerating her ensures. Having bowel function and voiding    5/27: She denies any abdominal pain, nausea, or vomiting. Still stating that she has no appetite, but what she is eating she is tolerating without difficulty. Still awaiting physical therapy to assist patient with ambulation. Rcvd call from RN that patient refusing nasal cannula, O2 sats remain in low 90s on room air. Denies fever/chills/CP/SOB.    -Patient continued to be conservatively managed throughout remainder of hospitalization. She remained on Abx. Worked with therapy and was moblilized. She had improvement in her leuckocytosis and was accepted to [facility] with palliative care medicine coordinating evenutal hospice care. At this time patient is stable for discharge to [facility].

## 2017-06-04 NOTE — PROGRESS NOTE ADULT - SUBJECTIVE AND OBJECTIVE BOX
SUBJECTIVE: Patient seen and examined at bedside. No acute events overnight. Pt awake and alert today, no complaints at this time. Plan for discharge to [facility] today discussed with patient. Pt denies chest pain, shortness of breath, fever, chills, nausea or emesis.      MEDICATIONS  (STANDING):  heparin  Injectable 5000Unit(s) SubCutaneous every 8 hours  metoprolol 50milliGRAM(s) Oral two times a day  allopurinol 100milliGRAM(s) Oral daily  sertraline 50milliGRAM(s) Oral daily  ALBUTerol/ipratropium for Nebulization 3milliLiter(s) Nebulizer every 6 hours  ALBUTerol    90 MICROgram(s) HFA Inhaler 1Puff(s) Inhalation every 4 hours  furosemide    Tablet 20milliGRAM(s) Oral every other day  levothyroxine 125MICROGram(s) Oral daily  haloperidol    Injectable 2.5milliGRAM(s) IntraMuscular once    MEDICATIONS  (PRN):  ALBUTerol/ipratropium for Nebulization 3milliLiter(s) Nebulizer every 4 hours PRN additional wheezing  oxyCODONE IR 2.5milliGRAM(s) Oral four times a day PRN Moderate Pain (4 - 6)      Vital Signs Last 24 Hrs  T(C): 36.6, Max: 36.7 (06-03 @ 15:00)  T(F): 97.8, Max: 98.1 (06-03 @ 15:00)  HR: 71 (71 - 110)  BP: 112/38 (94/44 - 112/48)  BP(mean): --  RR: 16 (16 - 17)  SpO2: 100% (97% - 100%)    PE  Gen: NAD  Pulm: CTAB  CV: RRR, normal intensity s1/s2  Abd: soft, nontender, nondistended  Ext: moving all extremities  Vasc: 2+ peripheral pulses  Neuro: GCS 15, nonfocal      I&O's Detail    I & Os for current day (as of 04 Jun 2017 09:14)  =============================================  IN:    Total IN: 0 ml  ---------------------------------------------  OUT:    Voided: 500 ml    Total OUT: 500 ml  ---------------------------------------------  Total NET: -500 ml

## 2017-06-04 NOTE — DISCHARGE NOTE ADULT - PATIENT PORTAL LINK FT
“You can access the Follow[employer] Patient Portal, offered by [facility], by registering with the following website: [email or web address]”

## 2017-06-04 NOTE — DISCHARGE NOTE ADULT - MEDICATION SUMMARY - MEDICATIONS TO TAKE
I will START or STAY ON the medications listed below when I get home from the hospital:    sertraline 50 mg oral tablet  -- 1 tab(s) by mouth once a day  -- Indication: For Debility    allopurinol  -- 100 milligram(s) by mouth once a day  -- Indication: For High cholesterol    fenofibrate  -- 200 megabecquerel(s) by mouth once a day  -- Indication: For High cholesterol    Toprol- mg oral tablet, extended release  --  by mouth once a day  -- Indication: For Hypertension    bumetanide  -- 1 milligram(s) by mouth once a day  -- Indication: For Acute biliary pancreatitis, unspecified complication status    Lovaza 1000 mg oral capsule  -- 2 cap(s) by mouth 2 times a day  -- Indication: For Debility    Dexilant 60 mg oral delayed release capsule  -- 1 cap(s) by mouth once a day  -- Indication: For Gallstone pancreatitis    Synthroid  -- 125 microgram(s) by mouth once a day  -- Indication: For Palliative care encounter

## 2017-06-04 NOTE — DISCHARGE NOTE ADULT - CARE PLAN
Goal:	alleviation of pain and symptoms, prevention of recurrence or complication  Instructions for follow-up, activity and diet:	BATHING:  You may shower and/or sponge bathe.   ACTIVITY: No heavy lifting or straining. Otherwise, you may return to your usual level of physical activity. If you are taking narcotic pain medication (such as Percocet) DO NOT drive a car, operate machinery or make important decisions.  DIET: Return to your usual diet.  NOTIFY YOUR SURGEON IF: You have any bleeding that does not stop, any pus draining from your wound(s), any fever (over 100.4 F) or chills, persistent nausea/vomiting, persistent diarrhea, or if your pain is not controlled on your discharge pain medications.  FOLLOW-UP: Please follow up with your primary care physician and Acute Care Surgery clinic [phone] in 10-14 days regarding your hospitalization. Call for appointment upon discharge. Goal:	alleviation of pain and symptoms, prevention of recurrence or complication  Instructions for follow-up, activity and diet:	BATHING:  You may shower and/or sponge bathe.   ACTIVITY: No heavy lifting or straining. Otherwise, you may return to your usual level of physical activity. If you are taking narcotic pain medication (such as Percocet) DO NOT drive a car, operate machinery or make important decisions.  DIET: Return to your usual diet.  NOTIFY YOUR SURGEON IF: You have any bleeding that does not stop, any pus draining from your wound(s), any fever (over 100.4 F) or chills, persistent nausea/vomiting, persistent diarrhea, or if your pain is not controlled on your discharge pain medications.  FOLLOW-UP: Please follow up with your primary care physician and Acute Care Surgery clinic [phone] in 10-14 days regarding your hospitalization. Call for appointment upon discharge. Principal Discharge DX:	Gallstone pancreatitis  Goal:	alleviation of pain and symptoms, prevention of recurrence or complication  Instructions for follow-up, activity and diet:	BATHING:  You may shower and/or sponge bathe.   ACTIVITY: No heavy lifting or straining. Otherwise, you may return to your usual level of physical activity. If you are taking narcotic pain medication (such as Percocet) DO NOT drive a car, operate machinery or make important decisions.  DIET: Return to your usual diet.  NOTIFY YOUR SURGEON IF: You have any bleeding that does not stop, any pus draining from your wound(s), any fever (over 100.4 F) or chills, persistent nausea/vomiting, persistent diarrhea, or if your pain is not controlled on your discharge pain medications.  FOLLOW-UP: Please follow up with your primary care physician and Acute Care Surgery clinic [phone] in 10-14 days regarding your hospitalization. Call for appointment upon discharge.  Secondary Diagnosis:	Bacteremia due to Escherichia coli  Secondary Diagnosis:	Cholangitis due to bile duct calculus with obstruction  Secondary Diagnosis:	Septic shock  Secondary Diagnosis:	Acute kidney injury

## 2017-06-04 NOTE — PROGRESS NOTE ADULT - PROBLEM SELECTOR PROBLEM 1
Acute biliary pancreatitis, unspecified complication status
Cholangitis due to bile duct calculus with obstruction
Gallstone pancreatitis
Acute biliary pancreatitis, unspecified complication status
Acute biliary pancreatitis, unspecified complication status
Gallstone pancreatitis
Gallstone pancreatitis

## 2017-06-04 NOTE — DISCHARGE NOTE ADULT - CARE PROVIDERS DIRECT ADDRESSES
[email or web address]

## 2017-06-04 NOTE — DISCHARGE NOTE ADULT - CARE PROVIDER_API CALL
Teddy Garcia (DO), Surgical ICU  [address] [place] [address]  Phone: [phone]  Fax: [phone]

## 2017-07-22 ENCOUNTER — EMERGENCY (EMERGENCY)
Facility: HOSPITAL | Age: 82
LOS: 1 days | Discharge: HOME | End: 2017-07-22
Attending: EMERGENCY MEDICINE | Admitting: EMERGENCY MEDICINE
Payer: MEDICARE

## 2017-07-22 VITALS
DIASTOLIC BLOOD PRESSURE: 59 MMHG | WEIGHT: 102.96 LBS | HEART RATE: 74 BPM | RESPIRATION RATE: 18 BRPM | TEMPERATURE: 98 F | SYSTOLIC BLOOD PRESSURE: 112 MMHG | OXYGEN SATURATION: 96 % | HEIGHT: 60 IN

## 2017-07-22 VITALS — HEART RATE: 70 BPM | RESPIRATION RATE: 18 BRPM | OXYGEN SATURATION: 99 %

## 2017-07-22 LAB
ALBUMIN SERPL ELPH-MCNC: 3.8 G/DL — SIGNIFICANT CHANGE UP (ref 3.3–5.2)
ALP SERPL-CCNC: 71 U/L — SIGNIFICANT CHANGE UP (ref 40–120)
ALT FLD-CCNC: 19 U/L — SIGNIFICANT CHANGE UP
ANION GAP SERPL CALC-SCNC: 14 MMOL/L — SIGNIFICANT CHANGE UP (ref 5–17)
APPEARANCE UR: CLEAR — SIGNIFICANT CHANGE UP
AST SERPL-CCNC: 42 U/L — HIGH
BACTERIA # UR AUTO: ABNORMAL
BILIRUB SERPL-MCNC: 1.1 MG/DL — SIGNIFICANT CHANGE UP (ref 0.4–2)
BILIRUB UR-MCNC: NEGATIVE — SIGNIFICANT CHANGE UP
BUN SERPL-MCNC: 51 MG/DL — HIGH (ref 8–20)
CALCIUM SERPL-MCNC: 9.4 MG/DL — SIGNIFICANT CHANGE UP (ref 8.6–10.2)
CHLORIDE SERPL-SCNC: 101 MMOL/L — SIGNIFICANT CHANGE UP (ref 98–107)
CO2 SERPL-SCNC: 28 MMOL/L — SIGNIFICANT CHANGE UP (ref 22–29)
COLOR SPEC: YELLOW — SIGNIFICANT CHANGE UP
CREAT SERPL-MCNC: 1.07 MG/DL — SIGNIFICANT CHANGE UP (ref 0.5–1.3)
DIFF PNL FLD: NEGATIVE — SIGNIFICANT CHANGE UP
EPI CELLS # UR: SIGNIFICANT CHANGE UP
GLUCOSE SERPL-MCNC: 96 MG/DL — SIGNIFICANT CHANGE UP (ref 70–115)
GLUCOSE UR QL: NEGATIVE MG/DL — SIGNIFICANT CHANGE UP
HCT VFR BLD CALC: 32.8 % — LOW (ref 37–47)
HGB BLD-MCNC: 11 G/DL — LOW (ref 12–16)
KETONES UR-MCNC: NEGATIVE — SIGNIFICANT CHANGE UP
LEUKOCYTE ESTERASE UR-ACNC: ABNORMAL
MCHC RBC-ENTMCNC: 32.8 PG — HIGH (ref 27–31)
MCHC RBC-ENTMCNC: 33.5 G/DL — SIGNIFICANT CHANGE UP (ref 32–36)
MCV RBC AUTO: 97.9 FL — SIGNIFICANT CHANGE UP (ref 81–99)
NITRITE UR-MCNC: NEGATIVE — SIGNIFICANT CHANGE UP
PH UR: 7 — SIGNIFICANT CHANGE UP (ref 5–8)
PLATELET # BLD AUTO: 391 K/UL — SIGNIFICANT CHANGE UP (ref 150–400)
POTASSIUM SERPL-MCNC: 3.9 MMOL/L — SIGNIFICANT CHANGE UP (ref 3.5–5.3)
POTASSIUM SERPL-SCNC: 3.9 MMOL/L — SIGNIFICANT CHANGE UP (ref 3.5–5.3)
PROT SERPL-MCNC: 7.4 G/DL — SIGNIFICANT CHANGE UP (ref 6.6–8.7)
PROT UR-MCNC: NEGATIVE MG/DL — SIGNIFICANT CHANGE UP
RBC # BLD: 3.35 M/UL — LOW (ref 4.4–5.2)
RBC # FLD: 17.5 % — HIGH (ref 11–15.6)
RBC CASTS # UR COMP ASSIST: SIGNIFICANT CHANGE UP /HPF (ref 0–4)
SODIUM SERPL-SCNC: 143 MMOL/L — SIGNIFICANT CHANGE UP (ref 135–145)
SP GR SPEC: 1 — LOW (ref 1.01–1.02)
UROBILINOGEN FLD QL: 1 MG/DL
WBC # BLD: 8.6 K/UL — SIGNIFICANT CHANGE UP (ref 4.8–10.8)
WBC # FLD AUTO: 8.6 K/UL — SIGNIFICANT CHANGE UP (ref 4.8–10.8)
WBC UR QL: SIGNIFICANT CHANGE UP

## 2017-07-22 PROCEDURE — 72125 CT NECK SPINE W/O DYE: CPT

## 2017-07-22 PROCEDURE — 73060 X-RAY EXAM OF HUMERUS: CPT | Mod: 26,LT

## 2017-07-22 PROCEDURE — 36415 COLL VENOUS BLD VENIPUNCTURE: CPT

## 2017-07-22 PROCEDURE — 70450 CT HEAD/BRAIN W/O DYE: CPT | Mod: 26

## 2017-07-22 PROCEDURE — 73030 X-RAY EXAM OF SHOULDER: CPT | Mod: 26,LT

## 2017-07-22 PROCEDURE — 70450 CT HEAD/BRAIN W/O DYE: CPT

## 2017-07-22 PROCEDURE — 85027 COMPLETE CBC AUTOMATED: CPT

## 2017-07-22 PROCEDURE — 73030 X-RAY EXAM OF SHOULDER: CPT

## 2017-07-22 PROCEDURE — 73060 X-RAY EXAM OF HUMERUS: CPT

## 2017-07-22 PROCEDURE — 80053 COMPREHEN METABOLIC PANEL: CPT

## 2017-07-22 PROCEDURE — 93010 ELECTROCARDIOGRAM REPORT: CPT

## 2017-07-22 PROCEDURE — 72125 CT NECK SPINE W/O DYE: CPT | Mod: 26

## 2017-07-22 PROCEDURE — 93005 ELECTROCARDIOGRAM TRACING: CPT

## 2017-07-22 PROCEDURE — 81001 URINALYSIS AUTO W/SCOPE: CPT

## 2017-07-22 PROCEDURE — 99284 EMERGENCY DEPT VISIT MOD MDM: CPT | Mod: 25

## 2017-07-22 PROCEDURE — 99284 EMERGENCY DEPT VISIT MOD MDM: CPT

## 2017-07-22 RX ORDER — SODIUM CHLORIDE 9 MG/ML
1000 INJECTION INTRAMUSCULAR; INTRAVENOUS; SUBCUTANEOUS ONCE
Qty: 0 | Refills: 0 | Status: COMPLETED | OUTPATIENT
Start: 2017-07-22 | End: 2017-07-22

## 2017-07-22 RX ORDER — ACETAMINOPHEN 500 MG
975 TABLET ORAL ONCE
Qty: 0 | Refills: 0 | Status: COMPLETED | OUTPATIENT
Start: 2017-07-22 | End: 2017-07-22

## 2017-07-22 RX ADMIN — SODIUM CHLORIDE 1000 MILLILITER(S): 9 INJECTION INTRAMUSCULAR; INTRAVENOUS; SUBCUTANEOUS at 17:12

## 2017-07-22 NOTE — ED ADULT NURSE NOTE - CHIEF COMPLAINT QUOTE
pt biba s/p fall from standing height, from the [facility], denies taking blood thinners, did not hit head, no loc, complains of left shoulder and arm pain

## 2017-07-22 NOTE — ED ADULT NURSE REASSESSMENT NOTE - NS ED NURSE REASSESS COMMENT FT1
pt remains alert and oriented X 3 with family at bedside.  Tolerating water.  Awaiting results.  Pt refused tylenol stating "I don't have any pain."  Respirations even and unlabored.  Comfortable.

## 2017-07-22 NOTE — ED PROVIDER NOTE - OBJECTIVE STATEMENT
89 year old presents to the ED with complaints of a fall. She has Parkinsons and uses a walker. She has fallen in the past. She reports no LOC. Takes ASA. 89 year old presents to the ED with complaints of a fall. She has Parkinsons and uses a walker. She has fallen in the past. She reports no LOC. Pt has afib and pacemaker. She does not take any blood thinner due to prior hx of uncontrolled epistaxis and fall. 89 year old presents to the ED with complaints of a fall. She has Parkinsons and uses a walker. She has fallen in the past. She reports no LOC. Pt has afib and pacemaker. She does not take any blood thinner due to prior hx of uncontrolled epistaxis and falls in past.   Family states she uses a walker and also always seems to be leaning to the left which has been going on for over 3 months. Family state she is currently at the [facility] and get PT there as well.

## 2017-07-22 NOTE — ED ADULT TRIAGE NOTE - CHIEF COMPLAINT QUOTE
pt biba s/p fall from standing height, from the [facility], denies taking blood thinners, did not hit head, no loc, complains of left shoulder and arm pain

## 2017-07-22 NOTE — ED ADULT NURSE NOTE - PMH
CVA (cerebral infarction)    High cholesterol    Hypertension    Pacemaker CVA (cerebral infarction)    High cholesterol    Hypertension    Pacemaker    Parkinsons

## 2017-07-22 NOTE — ED PROVIDER NOTE - NS ED ROS FT
Review of Systems:  	•	CONSTITUTIONAL : no fever or weight change  	•	SKIN : no rash  	•	HEMATOLOGIC : no petechia, no bruising  	•	EYES : no eye pain, no blurred vision  	•	ENT : no change in hearing, no sore throat  	•	RESPIRATORY : no shortness of breath, no cough  	•	CARDIAC : no chest pain, no palpitations  	•	GI : no abd pain, no nausea, no vomiting, no diarrhea, no constipation, no bleeding   	•	MUSCULOSKELETAL : + left shoulder and humerus pain.  	•	NEUROLOGIC : no LOC

## 2017-07-22 NOTE — ED PROVIDER NOTE - CARE PLAN
Principal Discharge DX:	Closed head injury, sequela  Secondary Diagnosis:	Neck sprain, initial encounter  Secondary Diagnosis:	Shoulder injury

## 2017-07-22 NOTE — ED ADULT NURSE NOTE - OBJECTIVE STATEMENT
Assumed pt care at 1150.  Pt reports "I came out of the bathroom and I just went down. I've done this before and I never know why I fall."  She is c/o mild discomfort to left shoulder when she attempts to move it.  no signs of trauma present.  Assisted to bathroom.  Able to bear weight.

## 2017-09-25 ENCOUNTER — INPATIENT (INPATIENT)
Facility: HOSPITAL | Age: 82
LOS: 4 days | DRG: 444 | End: 2017-09-30
Attending: INTERNAL MEDICINE | Admitting: INTERNAL MEDICINE
Payer: MEDICARE

## 2017-09-25 VITALS — HEIGHT: 60 IN | WEIGHT: 117.95 LBS

## 2017-09-25 DIAGNOSIS — K83.1 OBSTRUCTION OF BILE DUCT: ICD-10-CM

## 2017-09-25 DIAGNOSIS — Z29.9 ENCOUNTER FOR PROPHYLACTIC MEASURES, UNSPECIFIED: ICD-10-CM

## 2017-09-25 DIAGNOSIS — I48.91 UNSPECIFIED ATRIAL FIBRILLATION: ICD-10-CM

## 2017-09-25 DIAGNOSIS — F32.9 MAJOR DEPRESSIVE DISORDER, SINGLE EPISODE, UNSPECIFIED: ICD-10-CM

## 2017-09-25 DIAGNOSIS — M10.9 GOUT, UNSPECIFIED: ICD-10-CM

## 2017-09-25 DIAGNOSIS — E03.9 HYPOTHYROIDISM, UNSPECIFIED: ICD-10-CM

## 2017-09-25 DIAGNOSIS — K21.9 GASTRO-ESOPHAGEAL REFLUX DISEASE WITHOUT ESOPHAGITIS: ICD-10-CM

## 2017-09-25 DIAGNOSIS — E78.00 PURE HYPERCHOLESTEROLEMIA, UNSPECIFIED: ICD-10-CM

## 2017-09-25 DIAGNOSIS — I10 ESSENTIAL (PRIMARY) HYPERTENSION: ICD-10-CM

## 2017-09-25 LAB
ALBUMIN SERPL ELPH-MCNC: 3.7 G/DL — SIGNIFICANT CHANGE UP (ref 3.3–5.2)
ALP SERPL-CCNC: 310 U/L — HIGH (ref 40–120)
ALT FLD-CCNC: 21 U/L — SIGNIFICANT CHANGE UP
ANION GAP SERPL CALC-SCNC: 15 MMOL/L — SIGNIFICANT CHANGE UP (ref 5–17)
APAP SERPL-MCNC: <7.5 UG/ML — LOW (ref 10–26)
APPEARANCE UR: CLEAR — SIGNIFICANT CHANGE UP
APTT BLD: 33.6 SEC — SIGNIFICANT CHANGE UP (ref 27.5–37.4)
AST SERPL-CCNC: 31 U/L — SIGNIFICANT CHANGE UP
BACTERIA # UR AUTO: ABNORMAL
BASOPHILS # BLD AUTO: 0 K/UL — SIGNIFICANT CHANGE UP (ref 0–0.2)
BASOPHILS NFR BLD AUTO: 0.4 % — SIGNIFICANT CHANGE UP (ref 0–2)
BILIRUB SERPL-MCNC: 6 MG/DL — HIGH (ref 0.4–2)
BILIRUB UR-MCNC: NEGATIVE — SIGNIFICANT CHANGE UP
BUN SERPL-MCNC: 35 MG/DL — HIGH (ref 8–20)
CALCIUM SERPL-MCNC: 9 MG/DL — SIGNIFICANT CHANGE UP (ref 8.6–10.2)
CHLORIDE SERPL-SCNC: 98 MMOL/L — SIGNIFICANT CHANGE UP (ref 98–107)
CO2 SERPL-SCNC: 29 MMOL/L — SIGNIFICANT CHANGE UP (ref 22–29)
COLOR SPEC: YELLOW — SIGNIFICANT CHANGE UP
CREAT SERPL-MCNC: 0.76 MG/DL — SIGNIFICANT CHANGE UP (ref 0.5–1.3)
DIFF PNL FLD: NEGATIVE — SIGNIFICANT CHANGE UP
EOSINOPHIL # BLD AUTO: 0.2 K/UL — SIGNIFICANT CHANGE UP (ref 0–0.5)
EOSINOPHIL NFR BLD AUTO: 1.4 % — SIGNIFICANT CHANGE UP (ref 0–6)
EPI CELLS # UR: SIGNIFICANT CHANGE UP
GLUCOSE SERPL-MCNC: 90 MG/DL — SIGNIFICANT CHANGE UP (ref 70–115)
GLUCOSE UR QL: NEGATIVE MG/DL — SIGNIFICANT CHANGE UP
HCT VFR BLD CALC: 34 % — LOW (ref 37–47)
HGB BLD-MCNC: 11.4 G/DL — LOW (ref 12–16)
INR BLD: 1.3 RATIO — HIGH (ref 0.88–1.16)
KETONES UR-MCNC: NEGATIVE — SIGNIFICANT CHANGE UP
LEUKOCYTE ESTERASE UR-ACNC: ABNORMAL
LIDOCAIN IGE QN: 64 U/L — HIGH (ref 22–51)
LYMPHOCYTES # BLD AUTO: 19.6 % — LOW (ref 20–55)
LYMPHOCYTES # BLD AUTO: 2.2 K/UL — SIGNIFICANT CHANGE UP (ref 1–4.8)
MCHC RBC-ENTMCNC: 32.1 PG — HIGH (ref 27–31)
MCHC RBC-ENTMCNC: 33.5 G/DL — SIGNIFICANT CHANGE UP (ref 32–36)
MCV RBC AUTO: 95.8 FL — SIGNIFICANT CHANGE UP (ref 81–99)
MONOCYTES # BLD AUTO: 1.4 K/UL — HIGH (ref 0–0.8)
MONOCYTES NFR BLD AUTO: 12.6 % — HIGH (ref 3–10)
NEUTROPHILS # BLD AUTO: 7.2 K/UL — SIGNIFICANT CHANGE UP (ref 1.8–8)
NEUTROPHILS NFR BLD AUTO: 65.3 % — SIGNIFICANT CHANGE UP (ref 37–73)
NITRITE UR-MCNC: POSITIVE
PH UR: 7 — SIGNIFICANT CHANGE UP (ref 5–8)
PLATELET # BLD AUTO: 389 K/UL — SIGNIFICANT CHANGE UP (ref 150–400)
POTASSIUM SERPL-MCNC: 3.7 MMOL/L — SIGNIFICANT CHANGE UP (ref 3.5–5.3)
POTASSIUM SERPL-SCNC: 3.7 MMOL/L — SIGNIFICANT CHANGE UP (ref 3.5–5.3)
PROT SERPL-MCNC: 7.3 G/DL — SIGNIFICANT CHANGE UP (ref 6.6–8.7)
PROT UR-MCNC: NEGATIVE MG/DL — SIGNIFICANT CHANGE UP
PROTHROM AB SERPL-ACNC: 14.4 SEC — HIGH (ref 9.8–12.7)
RBC # BLD: 3.55 M/UL — LOW (ref 4.4–5.2)
RBC # FLD: 16.3 % — HIGH (ref 11–15.6)
RBC CASTS # UR COMP ASSIST: SIGNIFICANT CHANGE UP /HPF (ref 0–4)
SODIUM SERPL-SCNC: 142 MMOL/L — SIGNIFICANT CHANGE UP (ref 135–145)
SP GR SPEC: 1 — LOW (ref 1.01–1.02)
UROBILINOGEN FLD QL: 4 MG/DL
WBC # BLD: 11.1 K/UL — HIGH (ref 4.8–10.8)
WBC # FLD AUTO: 11.1 K/UL — HIGH (ref 4.8–10.8)
WBC UR QL: SIGNIFICANT CHANGE UP

## 2017-09-25 PROCEDURE — 71010: CPT | Mod: 26

## 2017-09-25 PROCEDURE — 99285 EMERGENCY DEPT VISIT HI MDM: CPT

## 2017-09-25 PROCEDURE — 76705 ECHO EXAM OF ABDOMEN: CPT | Mod: 26

## 2017-09-25 PROCEDURE — 93010 ELECTROCARDIOGRAM REPORT: CPT

## 2017-09-25 PROCEDURE — 74177 CT ABD & PELVIS W/CONTRAST: CPT | Mod: 26

## 2017-09-25 PROCEDURE — 99223 1ST HOSP IP/OBS HIGH 75: CPT | Mod: GC

## 2017-09-25 RX ORDER — FENOFIBRATE,MICRONIZED 130 MG
200 CAPSULE ORAL
Qty: 0 | Refills: 0 | COMMUNITY

## 2017-09-25 RX ORDER — ERTAPENEM SODIUM 1 G/1
1000 INJECTION, POWDER, LYOPHILIZED, FOR SOLUTION INTRAMUSCULAR; INTRAVENOUS ONCE
Qty: 0 | Refills: 0 | Status: COMPLETED | OUTPATIENT
Start: 2017-09-25 | End: 2017-09-25

## 2017-09-25 RX ORDER — SODIUM CHLORIDE 9 MG/ML
1000 INJECTION, SOLUTION INTRAVENOUS
Qty: 0 | Refills: 0 | Status: DISCONTINUED | OUTPATIENT
Start: 2017-09-25 | End: 2017-09-28

## 2017-09-25 RX ORDER — ERTAPENEM SODIUM 1 G/1
1000 INJECTION, POWDER, LYOPHILIZED, FOR SOLUTION INTRAMUSCULAR; INTRAVENOUS EVERY 24 HOURS
Qty: 0 | Refills: 0 | Status: DISCONTINUED | OUTPATIENT
Start: 2017-09-26 | End: 2017-09-29

## 2017-09-25 RX ORDER — DEXLANSOPRAZOLE 30 MG/1
1 CAPSULE, DELAYED RELEASE ORAL
Qty: 0 | Refills: 0 | COMMUNITY

## 2017-09-25 RX ORDER — SODIUM CHLORIDE 9 MG/ML
500 INJECTION INTRAMUSCULAR; INTRAVENOUS; SUBCUTANEOUS ONCE
Qty: 0 | Refills: 0 | Status: COMPLETED | OUTPATIENT
Start: 2017-09-25 | End: 2017-09-25

## 2017-09-25 RX ORDER — ERTAPENEM SODIUM 1 G/1
INJECTION, POWDER, LYOPHILIZED, FOR SOLUTION INTRAMUSCULAR; INTRAVENOUS
Qty: 0 | Refills: 0 | Status: DISCONTINUED | OUTPATIENT
Start: 2017-09-25 | End: 2017-09-29

## 2017-09-25 RX ORDER — OMEGA-3 ACID ETHYL ESTERS 1 G
2 CAPSULE ORAL
Qty: 0 | Refills: 0 | COMMUNITY

## 2017-09-25 RX ORDER — ERTAPENEM SODIUM 1 G/1
1000 INJECTION, POWDER, LYOPHILIZED, FOR SOLUTION INTRAMUSCULAR; INTRAVENOUS ONCE
Qty: 0 | Refills: 0 | Status: DISCONTINUED | OUTPATIENT
Start: 2017-09-25 | End: 2017-09-25

## 2017-09-25 RX ORDER — ERTAPENEM SODIUM 1 G/1
INJECTION, POWDER, LYOPHILIZED, FOR SOLUTION INTRAMUSCULAR; INTRAVENOUS
Qty: 0 | Refills: 0 | Status: DISCONTINUED | OUTPATIENT
Start: 2017-09-25 | End: 2017-09-25

## 2017-09-25 RX ADMIN — SODIUM CHLORIDE 1500 MILLILITER(S): 9 INJECTION INTRAMUSCULAR; INTRAVENOUS; SUBCUTANEOUS at 15:06

## 2017-09-25 NOTE — H&P ADULT - ASSESSMENT
89 year old woman past medical history of , gout, depression, CVA hyperlipidemia, HTN, Parkinson, hypothyroidism presented to the ED with her daughter of complaints of yellowing of the skin. Patient was admitted for Jaundice              Jaundice 2/2 to Choledocholithiasis. Afib(not on anticoagulation  gout, depression, CVA hyperlipidemia, HTN, Parkinson, hypothyroidism presented to the ED with her daughter of complaints of yellowing of the skin.            Jaundice 2/2 to Choledocholithiasis.

## 2017-09-25 NOTE — H&P ADULT - PROBLEM SELECTOR PLAN 6
Will order lipid panel  -possibly start patient on statin. - continue home medication  - sertraline 50 mg  once daily. - continue home medication  - Sertaline 125mcg once daily

## 2017-09-25 NOTE — ED PROVIDER NOTE - OBJECTIVE STATEMENT
This patient is a 89 year old woman who presents to the ER with her daughter for jaundice.  Patient has hx of gallstone pancreatitis and was admitted to the ICU 4 months ago but family and patient refused cholecystectomy at that time.  For the past 4 days patient has been experiencing epigastric pain after eating.  Her daughter noticed her to be slightly jaundiced yesterday and it worsened today.  Patient has no complaints.  She denies vomiting, fever, and abdominal pain.

## 2017-09-25 NOTE — H&P ADULT - PMH
CVA (cerebral infarction)    High cholesterol    Hypertension    Pacemaker    Parkinsons Afib    CVA (cerebral infarction)    Gout    High cholesterol    Hypertension    Pacemaker    Parkinsons

## 2017-09-25 NOTE — H&P ADULT - NSHPPHYSICALEXAM_GEN_ALL_CORE
Vital Signs Last 24 Hrs  T(C): 36.6 (25 Sep 2017 19:05), Max: 36.6 (25 Sep 2017 19:05)  T(F): 97.8 (25 Sep 2017 19:05), Max: 97.8 (25 Sep 2017 19:05)  HR: 85 (25 Sep 2017 19:05) (79 - 93)  BP: 153/77 (25 Sep 2017 19:05) (143/76 - 153/77)  BP(mean): --  RR: 16 (25 Sep 2017 19:05) (16 - 18)  SpO2: 95% (25 Sep 2017 19:05) (95% - 96%)      General: Elderly lady laying bed side, in non acute distress.   Neurology: A&Ox3, nonfocal, HCILEL x 4  eye: sclera icterus  ENT:  Mucosa moist, no ulcerations-  Neck:  Supple, no sinuses or palpable masses  Lymphatic:  No palpable cervical, supraclavicular, axillary or inguinal adenopathy  Respiratory: crackles noted bi-laterally base of the lungs.   CV:  irregular rhythm, normal rate,   Abdominal: Soft, NT, ND no palpable mass, positive bowel sounds.   MSK: No edema, + peripheral pulses, FROM all 4 extremity  Incisions: intact, no erythema or drainage  Skin: jaundice, hematoma noted on left forearm Vital Signs Last 24 Hrs  T(C): 36.6 (25 Sep 2017 19:05), Max: 36.6 (25 Sep 2017 19:05)  T(F): 97.8 (25 Sep 2017 19:05), Max: 97.8 (25 Sep 2017 19:05)  HR: 85 (25 Sep 2017 19:05) (79 - 93)  BP: 153/77 (25 Sep 2017 19:05) (143/76 - 153/77)  BP(mean): --  RR: 16 (25 Sep 2017 19:05) (16 - 18)  SpO2: 95% (25 Sep 2017 19:05) (95% - 96%)      General: Elderly lady laying bed side, in non acute distress.   Neurology: A&Ox3, nonfocal, CHILEL x 4  eye: sclera icterus  ENT:  Mucosa moist, no ulcerations-  Neck:  Supple, no sinuses or palpable masses  Lymphatic:  No palpable cervical, supraclavicular, axillary or inguinal adenopathy  Respiratory: crackles noted bi-laterally base of the lungs.   CV:  irregular rhythm, normal rate,   Abdominal: Soft, NT, ND no palpable mass, positive bowel sounds.   MSK: No edema, + peripheral pulses, FROM all 4 extremity  Skin: jaundice, ecchymosis  noted on left forearm Vital Signs Last 24 Hrs  T(C): 36.6 (25 Sep 2017 19:05), Max: 36.6 (25 Sep 2017 19:05)  T(F): 97.8 (25 Sep 2017 19:05), Max: 97.8 (25 Sep 2017 19:05)  HR: 85 (25 Sep 2017 19:05) (79 - 93)  BP: 153/77 (25 Sep 2017 19:05) (143/76 - 153/77)  BP(mean): --  RR: 16 (25 Sep 2017 19:05) (16 - 18)  SpO2: 95% (25 Sep 2017 19:05) (95% - 96%)      General: Elderly lady laying bed side, in non acute distress.   Neurology: A&Ox3, nonfocal, CHILEL x 4  eye: sclera icterus  ENT:  Mucosa moist, no ulcerations-  Neck:  Supple, no sinuses or palpable masses  Lymphatic:  No palpable cervical, supraclavicular, axillary or inguinal adenopathy  Respiratory: crackles noted bi-laterally base of the lungs.   CV:  irregular rhythm, normal rate,   Abdominal: Soft, NT, ND no palpable mass, positive bowel sounds.   MSK: No edema, + peripheral pulses, FROM all 4 extremity. resting tremor noted.   Skin: jaundice, ecchymosis  noted on left forearm

## 2017-09-25 NOTE — H&P ADULT - PROBLEM SELECTOR PLAN 5
- continue home medication  - sertraline 50 mg  once daily. patient current BP is 135/66.  Will continue home medications   - metprolol XL- 100mg once daily. On ct noted patient has Left renal upper lobe pole calculus with a cortical atrophy- Currently patient denies any urinary symptoms

## 2017-09-25 NOTE — ED STATDOCS - PROGRESS NOTE DETAILS
89 y/o F pt with a PMHx of pancreatitis, gastritis, HTN, thyroid issue BIB daughter to the ED by her daughter to the ED c/o jaundice, chills abdominal pain and back pain x3 days. Lives in Nursing home. She was admitted to the hospital for gall stones. Denies chest pain, difficulty breathing, urinary symptoms, n/v/d/c, or any other complaints. Allergic to Penicillin.  Focused eval, protocol orders entered. Pt to be moved to main ED for complete evaluation by another provider. 89 y/o F pt with a PMHx of pancreatitis, gastritis, HTN, thyroid issue BIB daughter to the ED by her daughter to the ED c/o jaundice x 3 days. pt notes that had chills abdominal pain and back pain prior to this no abd pain at this time. Lives in Nursing home. She was admitted to the hospital for gall stones pancreatitis in May. Denies chest pain, difficulty breathing, urinary symptoms, n/v/d/c, or any other complaints. Allergic to Penicillin.  Focused eval, protocol orders entered. Pt to be moved to main ED for complete evaluation by another provider.

## 2017-09-25 NOTE — H&P ADULT - NSHPSOCIALHISTORY_GEN_ALL_CORE
Patient is currently living in assisted living. Patient is currently living in assisted living.  No Smoking, Alcohol or Illicit Drug Use.

## 2017-09-25 NOTE — H&P ADULT - PROBLEM SELECTOR PLAN 2
EKG- confirmed the presence of A-fib. Cardio consult has been placed. Patient has pacemaker in place.  CHADsVAsc- 6.- points were given for Age, sex, history of HTN and CVA  has-bled- 4- points were given for HTN, age, prior history of bleeding and stroke. patient is high risk of bleeding EKG- confirmed the presence of A-fib. Cardio consult has been placed. Patient has pacemaker in place.  Patient- not on anticoagulation due to prior history of of bleeding.  CHADsVAsc- 6.- points were given for Age, sex, history of HTN and CVA  has-bled- 4- points were given for HTN, age, prior history of bleeding and stroke. patient is high risk of bleeding EKG- confirmed the presence of A-fib. Cardio consult has been placed. Patient has pacemaker in place.  Patient- not on anticoagulation due to prior history of of bleeding.  CHADsVAsc- 6.- points were given for Age, sex, history of HTN and CVA  has-bled- 4- points were given for HTN, age, prior history of bleeding and stroke. patient is high risk of bleeding  - patient will undergo ECHO

## 2017-09-25 NOTE — H&P ADULT - PROBLEM SELECTOR PLAN 8
continue home medications  - Zyloprim 100mg daily. - continue home medication  - sertraline 50 mg  once daily. - continue home medication  - levothyroxine 50 mg  once daily. - Will order TSH, T4 serum in the am. Will adjust medication if warranted.   - continue home medication  - levothyroxine 50 mg  once daily.

## 2017-09-25 NOTE — H&P ADULT - NSHPLABSRESULTS_GEN_ALL_CORE
11.4   11.1  )-----------( 389      ( 25 Sep 2017 15:17 )             34.0     25 Sep 2017 15:17    142    |  98     |  35.0   ----------------------------<  90     3.7     |  29.0   |  0.76     Ca    9.0        25 Sep 2017 15:17    TPro  7.3    /  Alb  3.7    /  TBili  6.0    /  DBili  x      /  AST  31     /  ALT  21     /  AlkPhos  310    25 Sep 2017 15:17    LIVER FUNCTIONS - ( 25 Sep 2017 15:17 )  Alb: 3.7 g/dL / Pro: 7.3 g/dL / ALK PHOS: 310 U/L / ALT: 21 U/L / AST: 31 U/L / GGT: x           PT/INR - ( 25 Sep 2017 15:17 )   PT: 14.4 sec;   INR: 1.30 ratio         PTT - ( 25 Sep 2017 15:17 )  PTT:33.6 sec  CAPILLARY BLOOD GLUCOSE            Urinalysis Basic - ( 25 Sep 2017 17:00 )    Color: Yellow / Appearance: Clear / S.005 / pH: x  Gluc: x / Ketone: Negative  / Bili: Negative / Urobili: 4 mg/dL   Blood: x / Protein: Negative mg/dL / Nitrite: Positive   Leuk Esterase: Trace / RBC: 0-2 /HPF / WBC 3-5   Sq Epi: x / Non Sq Epi: x / Bacteria: Few      < from: US Gallbladder (17 @ 16:10) >      IMPRESSION:     Cholelithiasis.    < end of copied text >        < from: CT Abdomen and Pelvis w/ Oral Cont and w/ IV Cont (17 @ 17:58) >    MPRESSION:   Extrahepatic biliary duct obstruction caused by distal common bile duct   stonemeasuring 2-3 mm.  Moderate right effusion with right lower lobe atelectasis. A  Left renal upper lobe pole calculus with a cortical atrophy     < end of copied text >

## 2017-09-25 NOTE — ED ADULT NURSE REASSESSMENT NOTE - NS ED NURSE REASSESS COMMENT FT1
MD Penaloza aware US and CT results are back. Pending MD Penaloza to update family regarding plan of care.

## 2017-09-25 NOTE — ED PROVIDER NOTE - CONSTITUTIONAL, MLM
normal... Well appearing, well nourished, awake, alert, oriented to person, place, time/situation and in no apparent distress. no chest pain, + Lower ext edema.

## 2017-09-25 NOTE — ED ADULT NURSE NOTE - OBJECTIVE STATEMENT
PT came in brought in by her daughter at bedside, PT from [facility] "Daughter son my mother is getting more yellow." Generalized jaundice noted, no scleral jaundice at this time. Plan for CT scan. PT denies ABD pain/N/V at this time

## 2017-09-25 NOTE — ED ADULT TRIAGE NOTE - CHIEF COMPLAINT QUOTE
" she yellow since yesterday " per daughter.  denies any c/o pain or discomfort. no appetite x a few days. pt lives at an assisted living

## 2017-09-25 NOTE — H&P ADULT - ATTENDING COMMENTS
Patient seen and examined in ER for Choledocholithiasis. GI consult is placed for possible ERCP. Patient and family had refused Cholecystectomy in past. Also has Right Pleural Effusion. No respiratory distress. ECHO in am and CTS Eval.   She has chronic T11 fracture. Denies back pain or neurologic symptoms.   She is DNR and DNI.

## 2017-09-25 NOTE — H&P ADULT - HISTORY OF PRESENT ILLNESS
89 year old woman past medical history of , gout, depression, CVA hyperlipidemia, HTN, Parkinson, hypothyroidism presented to the ED with her daughter of complaints of yellowing of the skin. As per patient states this is the first time this has occurred. In the past the patient has had a bout of gallstone pancreatitis, during that admission the option of cholecystectomy was given and the patient refused it. She states the jaundice has been present for 4 days now. Did admit to having abdominal pain in the past couple of days, but currently does not have abdominal pain. Denies any recent travel nor sick contacts. Denies any pruritis  fever, nausea, vomiting, chest pain nor shortness of breathe. 89 year old woman past medical history of , Afib(not on anticoagulation  gout, depression, CVA hyperlipidemia, HTN, Parkinson, hypothyroidism presented to the ED with her daughter of complaints of yellowing of the skin. As per patient states this is the first time this has occurred. In the past the patient has had a bout of gallstone pancreatitis, during that admission the option of cholecystectomy was given and the patient refused it. She states the jaundice has been present for 4 days now. Did admit to having abdominal pain in the past couple of days, but currently does not have abdominal pain. Denies any recent travel nor sick contacts. Denies any pruritis  fever, nausea, vomiting, chest pain nor shortness of breathe, denies 89 year old woman past medical history of , Afib(not on anticoagulation  gout, depression, CVA hyperlipidemia, HTN, Parkinson, hypothyroidism presented to the ED with her daughter of complaints of yellowing of the skin. As per patient states this is the first time this has occurred. In the past the patient has had a bout of gallstone pancreatitis, during that admission the option of cholecystectomy was given and the patient refused it. She states the jaundice has been present for 4 days now. Did admit to having abdominal pain in the past couple of days, but currently does not have abdominal pain. Denies any recent travel nor sick contacts. Denies any pruritis  fever, nausea, vomiting, chest pain nor shortness of breathe, dysuria 89 year old woman past medical history of , Afib(not on anticoagulation)  gout, depression, CVA hyperlipidemia, HTN, Parkinson, hypothyroidism presented to the ED with her daughter of complaints of yellowing of the skin. As per patient states this is the first time this has occurred. In the past the patient has had a bout of gallstone pancreatitis, during that admission the option of cholecystectomy was given and the patient refused it. She states the jaundice has been present for 4 days now. Did admit to having abdominal pain in the past couple of days, but currently does not have abdominal pain. Denies any recent travel nor sick contacts. Denies any pruritis  fever, nausea, vomiting, chest pain nor shortness of breathe, dysuria

## 2017-09-25 NOTE — H&P ADULT - PROBLEM SELECTOR PLAN 3
- continue home medication  - Sertaline 125mcg once daily current WBC is 11.1, absolute neutrophil count 65.1. The current wbc could be 2/2 stress reaction. There is no left shift present.

## 2017-09-25 NOTE — H&P ADULT - PROBLEM SELECTOR PLAN 1
Admit under Dr. Prakash Service. Diet NPO after midnight. Activity: ambulate as tolerated. Vital per routine. Patient is currently stable- in non acute distress- jaundice- denies purities.   - IVF- D5 normal saline 75cc/hr  -Strict I/0  US- impression showed Cholelithiasis.  CT- Extrahepatic biliary duct obstruction caused by distal common bile duct   stonemeasuring 2-3 mm.  - GI consulted- Dr. Bravo- will see the patient in the moring- possible ERCP? Admit under Dr. Prakash Service. Diet NPO after midnight. Activity: ambulate as tolerated. Vital per routine. Patient is currently stable- in non acute distress- jaundice- denies purities.   - IVF- D5 normal saline 75cc/hr  -Strict I/0  - Invanz- 1g daily  US- impression showed Cholelithiasis.  CT- Extrahepatic biliary duct obstruction caused by distal common bile duct   stonemeasuring 2-3 mm.  - GI consulted- Dr. Bravo- will see the patient in the moring- possible ERCP?  - Invanz- 1g daily

## 2017-09-25 NOTE — H&P ADULT - NSHPREVIEWOFSYSTEMS_GEN_ALL_CORE
CONSTITUTIONAL: No fever, chills, weight loss, or fatigue  EYES: No eye pain, visual disturbances, or discharge  ENMT:  No difficulty hearing, tinnitus, vertigo; No sinus or throat pain  NECK: No pain or stiffness  RESPIRATORY: No cough, wheezing, or shortness of breath  CARDIOVASCULAR: No chest pain, palpitations, dizziness, or leg swelling  GASTROINTESTINAL: No abdominal or epigastric pain. No nausea, vomiting, diarrhea. No melena or hematochezia.  GENITOURINARY: No dysuria, no hematuria  NEUROLOGICAL: No changes in strength/sensation   SKIN: No itching, burning, rashes, or lesions   LYMPH NODES: No enlarged glands  ENDOCRINE: No heat or cold intolerance; No hair loss  MUSCULOSKELETAL: No joint pain or swelling; No muscle, back, or extremity pain  PSYCHIATRIC: No depression, anxiety, mood swings  HEME/LYMPH: No easy bruising, or bleeding gums  ALLERGY AND IMMUNOLOGIC: No hives or eczema

## 2017-09-25 NOTE — H&P ADULT - PROBLEM SELECTOR PLAN 4
patient current BP is 135/66.  Will continue home medications   - metprolol XL- 100mg once daily. - continue home medication  - Sertaline 125mcg once daily -Moderate right effusion with right lower lobe atelectasis noted on CT. Consult is placed for CTS- patient is currently in non-distress. denies any chest pain nor shortness of breathe.

## 2017-09-26 DIAGNOSIS — D72.829 ELEVATED WHITE BLOOD CELL COUNT, UNSPECIFIED: ICD-10-CM

## 2017-09-26 DIAGNOSIS — N20.0 CALCULUS OF KIDNEY: ICD-10-CM

## 2017-09-26 DIAGNOSIS — E87.8 OTHER DISORDERS OF ELECTROLYTE AND FLUID BALANCE, NOT ELSEWHERE CLASSIFIED: ICD-10-CM

## 2017-09-26 DIAGNOSIS — Z45.018 ENCOUNTER FOR ADJUSTMENT AND MANAGEMENT OF OTHER PART OF CARDIAC PACEMAKER: Chronic | ICD-10-CM

## 2017-09-26 DIAGNOSIS — J90 PLEURAL EFFUSION, NOT ELSEWHERE CLASSIFIED: ICD-10-CM

## 2017-09-26 DIAGNOSIS — Z79.2 LONG TERM (CURRENT) USE OF ANTIBIOTICS: ICD-10-CM

## 2017-09-26 LAB
ALBUMIN SERPL ELPH-MCNC: 3.1 G/DL — LOW (ref 3.3–5.2)
ALP SERPL-CCNC: 264 U/L — HIGH (ref 40–120)
ALT FLD-CCNC: 18 U/L — SIGNIFICANT CHANGE UP
ANION GAP SERPL CALC-SCNC: 13 MMOL/L — SIGNIFICANT CHANGE UP (ref 5–17)
AST SERPL-CCNC: 26 U/L — SIGNIFICANT CHANGE UP
BILIRUB SERPL-MCNC: 4.2 MG/DL — HIGH (ref 0.4–2)
BUN SERPL-MCNC: 28 MG/DL — HIGH (ref 8–20)
CALCIUM SERPL-MCNC: 8.3 MG/DL — LOW (ref 8.6–10.2)
CHLORIDE SERPL-SCNC: 104 MMOL/L — SIGNIFICANT CHANGE UP (ref 98–107)
CO2 SERPL-SCNC: 27 MMOL/L — SIGNIFICANT CHANGE UP (ref 22–29)
CREAT SERPL-MCNC: 0.71 MG/DL — SIGNIFICANT CHANGE UP (ref 0.5–1.3)
GLUCOSE SERPL-MCNC: 128 MG/DL — HIGH (ref 70–115)
HCT VFR BLD CALC: 32.3 % — LOW (ref 37–47)
HGB BLD-MCNC: 10.4 G/DL — LOW (ref 12–16)
MAGNESIUM SERPL-MCNC: 1.4 MG/DL — LOW (ref 1.6–2.6)
MCHC RBC-ENTMCNC: 31.7 PG — HIGH (ref 27–31)
MCHC RBC-ENTMCNC: 32.2 G/DL — SIGNIFICANT CHANGE UP (ref 32–36)
MCV RBC AUTO: 98.5 FL — SIGNIFICANT CHANGE UP (ref 81–99)
PHOSPHATE SERPL-MCNC: 3 MG/DL — SIGNIFICANT CHANGE UP (ref 2.4–4.7)
PLATELET # BLD AUTO: 356 K/UL — SIGNIFICANT CHANGE UP (ref 150–400)
POTASSIUM SERPL-MCNC: 2.9 MMOL/L — CRITICAL LOW (ref 3.5–5.3)
POTASSIUM SERPL-SCNC: 2.9 MMOL/L — CRITICAL LOW (ref 3.5–5.3)
PROCALCITONIN SERPL-MCNC: 0.36 NG/ML — HIGH (ref 0–0.04)
PROT SERPL-MCNC: 6.3 G/DL — LOW (ref 6.6–8.7)
RBC # BLD: 3.28 M/UL — LOW (ref 4.4–5.2)
RBC # FLD: 17.4 % — HIGH (ref 11–15.6)
SODIUM SERPL-SCNC: 144 MMOL/L — SIGNIFICANT CHANGE UP (ref 135–145)
WBC # BLD: 11.4 K/UL — HIGH (ref 4.8–10.8)
WBC # FLD AUTO: 11.4 K/UL — HIGH (ref 4.8–10.8)

## 2017-09-26 PROCEDURE — 99233 SBSQ HOSP IP/OBS HIGH 50: CPT | Mod: GC

## 2017-09-26 PROCEDURE — 99223 1ST HOSP IP/OBS HIGH 75: CPT

## 2017-09-26 RX ORDER — METOPROLOL TARTRATE 50 MG
100 TABLET ORAL DAILY
Qty: 0 | Refills: 0 | Status: DISCONTINUED | OUTPATIENT
Start: 2017-09-26 | End: 2017-09-27

## 2017-09-26 RX ORDER — PANTOPRAZOLE SODIUM 20 MG/1
1 TABLET, DELAYED RELEASE ORAL
Qty: 0 | Refills: 0 | COMMUNITY

## 2017-09-26 RX ORDER — POTASSIUM CHLORIDE 20 MEQ
10 PACKET (EA) ORAL
Qty: 0 | Refills: 0 | Status: COMPLETED | OUTPATIENT
Start: 2017-09-26 | End: 2017-09-26

## 2017-09-26 RX ORDER — BUMETANIDE 0.25 MG/ML
1 INJECTION INTRAMUSCULAR; INTRAVENOUS DAILY
Qty: 0 | Refills: 0 | Status: DISCONTINUED | OUTPATIENT
Start: 2017-09-26 | End: 2017-09-26

## 2017-09-26 RX ORDER — LEVOTHYROXINE SODIUM 125 MCG
125 TABLET ORAL
Qty: 0 | Refills: 0 | COMMUNITY

## 2017-09-26 RX ORDER — POTASSIUM CHLORIDE 20 MEQ
40 PACKET (EA) ORAL EVERY 4 HOURS
Qty: 0 | Refills: 0 | Status: COMPLETED | OUTPATIENT
Start: 2017-09-26 | End: 2017-09-26

## 2017-09-26 RX ORDER — METOPROLOL TARTRATE 50 MG
0 TABLET ORAL
Qty: 0 | Refills: 0 | COMMUNITY

## 2017-09-26 RX ORDER — ALLOPURINOL 300 MG
100 TABLET ORAL
Qty: 0 | Refills: 0 | COMMUNITY

## 2017-09-26 RX ORDER — PANTOPRAZOLE SODIUM 20 MG/1
40 TABLET, DELAYED RELEASE ORAL
Qty: 0 | Refills: 0 | Status: DISCONTINUED | OUTPATIENT
Start: 2017-09-26 | End: 2017-09-29

## 2017-09-26 RX ORDER — BUMETANIDE 0.25 MG/ML
1 INJECTION INTRAMUSCULAR; INTRAVENOUS
Qty: 0 | Refills: 0 | COMMUNITY

## 2017-09-26 RX ORDER — LEVOTHYROXINE SODIUM 125 MCG
125 TABLET ORAL DAILY
Qty: 0 | Refills: 0 | Status: DISCONTINUED | OUTPATIENT
Start: 2017-09-26 | End: 2017-09-29

## 2017-09-26 RX ORDER — ALLOPURINOL 300 MG
100 TABLET ORAL DAILY
Qty: 0 | Refills: 0 | Status: DISCONTINUED | OUTPATIENT
Start: 2017-09-26 | End: 2017-09-29

## 2017-09-26 RX ORDER — SERTRALINE 25 MG/1
1 TABLET, FILM COATED ORAL
Qty: 0 | Refills: 0 | COMMUNITY

## 2017-09-26 RX ORDER — SACCHAROMYCES BOULARDII 250 MG
250 POWDER IN PACKET (EA) ORAL
Qty: 0 | Refills: 0 | Status: DISCONTINUED | OUTPATIENT
Start: 2017-09-26 | End: 2017-09-29

## 2017-09-26 RX ORDER — MAGNESIUM SULFATE 500 MG/ML
1 VIAL (ML) INJECTION ONCE
Qty: 0 | Refills: 0 | Status: COMPLETED | OUTPATIENT
Start: 2017-09-26 | End: 2017-09-26

## 2017-09-26 RX ORDER — SERTRALINE 25 MG/1
50 TABLET, FILM COATED ORAL DAILY
Qty: 0 | Refills: 0 | Status: DISCONTINUED | OUTPATIENT
Start: 2017-09-26 | End: 2017-09-27

## 2017-09-26 RX ORDER — METOPROLOL TARTRATE 50 MG
100 TABLET ORAL DAILY
Qty: 0 | Refills: 0 | Status: DISCONTINUED | OUTPATIENT
Start: 2017-09-26 | End: 2017-09-26

## 2017-09-26 RX ADMIN — ERTAPENEM SODIUM 120 MILLIGRAM(S): 1 INJECTION, POWDER, LYOPHILIZED, FOR SOLUTION INTRAMUSCULAR; INTRAVENOUS at 22:12

## 2017-09-26 RX ADMIN — Medication 100 GRAM(S): at 08:34

## 2017-09-26 RX ADMIN — SODIUM CHLORIDE 75 MILLILITER(S): 9 INJECTION, SOLUTION INTRAVENOUS at 00:54

## 2017-09-26 RX ADMIN — Medication 100 MILLIEQUIVALENT(S): at 10:40

## 2017-09-26 RX ADMIN — Medication 250 MILLIGRAM(S): at 06:11

## 2017-09-26 RX ADMIN — Medication 100 MILLIEQUIVALENT(S): at 12:56

## 2017-09-26 RX ADMIN — Medication 40 MILLIEQUIVALENT(S): at 10:49

## 2017-09-26 RX ADMIN — Medication 100 MILLIGRAM(S): at 06:11

## 2017-09-26 RX ADMIN — Medication 100 MILLIEQUIVALENT(S): at 11:48

## 2017-09-26 RX ADMIN — SERTRALINE 50 MILLIGRAM(S): 25 TABLET, FILM COATED ORAL at 11:48

## 2017-09-26 RX ADMIN — ERTAPENEM SODIUM 120 MILLIGRAM(S): 1 INJECTION, POWDER, LYOPHILIZED, FOR SOLUTION INTRAMUSCULAR; INTRAVENOUS at 00:28

## 2017-09-26 RX ADMIN — Medication 100 MILLIGRAM(S): at 11:48

## 2017-09-26 RX ADMIN — SODIUM CHLORIDE 75 MILLILITER(S): 9 INJECTION, SOLUTION INTRAVENOUS at 21:09

## 2017-09-26 RX ADMIN — Medication 40 MILLIEQUIVALENT(S): at 17:43

## 2017-09-26 RX ADMIN — Medication 1 DROP(S): at 06:11

## 2017-09-26 RX ADMIN — Medication 125 MICROGRAM(S): at 00:54

## 2017-09-26 RX ADMIN — SODIUM CHLORIDE 75 MILLILITER(S): 9 INJECTION, SOLUTION INTRAVENOUS at 15:15

## 2017-09-26 RX ADMIN — PANTOPRAZOLE SODIUM 40 MILLIGRAM(S): 20 TABLET, DELAYED RELEASE ORAL at 06:11

## 2017-09-26 RX ADMIN — Medication 250 MILLIGRAM(S): at 17:40

## 2017-09-26 RX ADMIN — Medication 1 DROP(S): at 17:41

## 2017-09-26 NOTE — PROGRESS NOTE ADULT - PROBLEM SELECTOR PLAN 7
patient current BP is 135/66.  Will continue home medications   - metoprolol XL- 100mg once daily. - continue home medication  - Sertaline 125mcg once daily

## 2017-09-26 NOTE — CONSULT NOTE ADULT - SUBJECTIVE AND OBJECTIVE BOX
HPI:  89 year old woman past medical history of PAF,SSS,  CVA , HTN, Parkinsons DZ presented to the ED with  complaints of jaundice. As per patient states this is the first time this has occurred. In the past the patient has had a bout of gallstone pancreatitis, during that admission the option of cholecystectomy was given and the patient refused it. She states the jaundice has been present for 4 days now. Did admit to having abdominal pain and nausea in the past couple of days, but currently does not have abdominal pain. Denies any recent travel nor sick contacts. Denies any pruritis  fever, vomiting, chest pain nor shortness of breathe, dysuria (25 Sep 2017 20:47).  Walks with a walker.  Previously deemed not a candidate for AC due to significant fall risk or to any interventional devices like Watchman LA occluder due to frailness and comorbidities including advanced age.  Ramipril was stopped 1 month ago due to hypotension        PAST MEDICAL & SURGICAL HISTORY:  Afib  Gout  Parkinsons  Pacemaker- generator replacement MDT -aug 2017  High cholesterol  CVA (cerebral infarction)  Hypertension  Adjustment and management of cardiac pacemaker      Medication: MEDICATIONS  (STANDING):  dextrose 5% + sodium chloride 0.9%. 1000 milliLiter(s) (75 mL/Hr) IV Continuous <Continuous>  ertapenem  IVPB      ertapenem  IVPB 1000 milliGRAM(s) IV Intermittent every 24 hours  levothyroxine 125 MICROGram(s) Oral daily  artificial  tears Solution 1 Drop(s) Both EYES two times a day  sertraline 50 milliGRAM(s) Oral daily  pantoprazole    Tablet 40 milliGRAM(s) Oral before breakfast  allopurinol 100 milliGRAM(s) Oral daily  saccharomyces boulardii 250 milliGRAM(s) Oral two times a day  metoprolol 100 milliGRAM(s) Oral daily  potassium chloride    Tablet ER 40 milliEquivalent(s) Oral every 4 hours    MEDICATIONS  (PRN):      Social hx: Negative tobacco, alcohol, herbal, and recreational substance use    Allergies: penicillins (Unknown)      FAMILY HISTORY:  No pertinent family history in first degree relatives      REVIEW OF SYSTEMS:    CONSTITUTIONAL:  No weight loss, fever, chills, weakness or fatigue.  HEENT:  Eyes:  No visual loss, blurred vision, double vision or yellow sclerae. Ears, Nose, Throat:  No hearing loss, sneezing, congestion, runny nose or sore throat.  SKIN:  No rash or itching.  CARDIOVASCULAR:  No chest pain, chest pressure or chest discomfort. No palpitations or edema.  RESPIRATORY:  No shortness of breath, NOBLE, cough or sputum.  GASTROINTESTINAL:  No anorexia, nausea, vomiting or diarrhea. No abdominal pain or blood.  GENITOURINARY:  No dysuria, urgency, or frequency  NEUROLOGICAL:  No headache, dizziness, syncope, paralysis, ataxia, numbness or tingling in the extremities. No change in bowel or bladder control.  MUSCULOSKELETAL:  No muscle, back pain, joint pain or stiffness.  HEMATOLOGIC:  No anemia, bleeding or bruising.  LYMPHATICS:  No enlarged nodes. No history of splenectomy.  PSYCHIATRIC:  No history of depression or anxiety.  ENDOCRINOLOGIC:  No reports of sweating, cold or heat intolerance. No polyuria or polydipsia.  ALLERGIES:  No history of asthma, hives, eczema or rhinitis      Vital Signs Last 24 Hrs  T(C): 36.4 (26 Sep 2017 15:41), Max: 36.7 (26 Sep 2017 08:00)  T(F): 97.6 (26 Sep 2017 15:41), Max: 98 (26 Sep 2017 08:00)  HR: 66 (26 Sep 2017 15:41) (66 - 85)  BP: 126/66 (26 Sep 2017 15:41) (123/73 - 153/77)  BP(mean): --  RR: 18 (26 Sep 2017 15:41) (16 - 18)  SpO2: 94% (26 Sep 2017 15:41) (92% - 96%)    General Appearance:  Comfortable, NAD  HEENT: PERRLA, EOMI, Fundi not visualized, Pharynx clear, JVP 5 cm, Carotid pulses 2+ B/L, no bruit  Chest: Clear to ascultation B/L  Left infraclavicular PPM   CV: PMI not displaced, S1S2 normal, No S3/S4 or murmur  Abd: Soft, NT, no mass, pulsation or bruit  Extrem: Radial and femoral pulses 2+ B/L.  No edema  Neuro: A+O X3,  Motor grossly intact  skin-jaundice          CBC Full  -  ( 26 Sep 2017 07:18 )  WBC Count : 11.4 K/uL  Hemoglobin : 10.4 g/dL  Hematocrit : 32.3 %  Platelet Count - Automated : 356 K/uL  Mean Cell Volume : 98.5 fl  Mean Cell Hemoglobin : 31.7 pg  Mean Cell Hemoglobin Concentration : 32.2 g/dL  Auto Neutrophil # : x  Auto Lymphocyte # : x  Auto Monocyte # : x  Auto Eosinophil # : x  Auto Basophil # : x  Auto Neutrophil % : x  Auto Lymphocyte % : x  Auto Monocyte % : x  Auto Eosinophil % : x  Auto Basophil % : x            09-26    144  |  104  |  28.0<H>  ----------------------------<  128<H>  2.9<LL>   |  27.0  |  0.71    Ca    8.3<L>      26 Sep 2017 07:18  Phos  3.0     09-26  Mg     1.4     09-26    TPro  6.3<L>  /  Alb  3.1<L>  /  TBili  4.2<H>  /  DBili  x   /  AST  26  /  ALT  18  /  AlkPhos  264<H>  09-26        stone on extrahepatic duct HPI:  89 year old woman past medical history of PAF,SSS,  CVA , HTN, Parkinsons DZ presented to the ED with  complaints of jaundice. As per patient states this is the first time this has occurred. In the past the patient has had a bout of gallstone pancreatitis, during that admission the option of cholecystectomy was given and the patient refused it. She states the jaundice has been present for 4 days now. Did admit to having abdominal pain and nausea in the past couple of days, but currently does not have abdominal pain. Denies any recent travel nor sick contacts. Denies any pruritis  fever, vomiting, chest pain nor shortness of breathe, dysuria (25 Sep 2017 20:47).  Walks with a walker.  Previously deemed not a candidate for AC due to significant fall risk or to any interventional devices like Watchman LA occluder due to frailness and comorbidities including advanced age.  Ramipril was stopped 1 month ago due to hypotension        PAST MEDICAL & SURGICAL HISTORY:  Afib  Gout  Parkinsons  Pacemaker- generator replacement MDT -aug 2017  High cholesterol  CVA (cerebral infarction)  Hypertension  Adjustment and management of cardiac pacemaker      Medication: MEDICATIONS  (STANDING):  dextrose 5% + sodium chloride 0.9%. 1000 milliLiter(s) (75 mL/Hr) IV Continuous <Continuous>  ertapenem  IVPB      ertapenem  IVPB 1000 milliGRAM(s) IV Intermittent every 24 hours  levothyroxine 125 MICROGram(s) Oral daily  artificial  tears Solution 1 Drop(s) Both EYES two times a day  sertraline 50 milliGRAM(s) Oral daily  pantoprazole    Tablet 40 milliGRAM(s) Oral before breakfast  allopurinol 100 milliGRAM(s) Oral daily  saccharomyces boulardii 250 milliGRAM(s) Oral two times a day  metoprolol 100 milliGRAM(s) Oral daily  potassium chloride    Tablet ER 40 milliEquivalent(s) Oral every 4 hours    MEDICATIONS  (PRN):      Social hx: Negative tobacco, alcohol, herbal, and recreational substance use    Allergies: penicillins (Unknown)      FAMILY HISTORY:  No pertinent family history in first degree relatives      REVIEW OF SYSTEMS:    CONSTITUTIONAL:  No weight loss, fever, chills, weakness or fatigue.  HEENT:  Eyes:  No visual loss, blurred vision, double vision or yellow sclerae. Ears, Nose, Throat:  No hearing loss, sneezing, congestion, runny nose or sore throat.  SKIN:  No rash or itching.  CARDIOVASCULAR:  No chest pain, chest pressure or chest discomfort. No palpitations or edema.  RESPIRATORY:  No shortness of breath, NOBLE, cough or sputum.  GASTROINTESTINAL:  No anorexia, nausea, vomiting or diarrhea. No abdominal pain or blood.  GENITOURINARY:  No dysuria, urgency, or frequency  NEUROLOGICAL:  No headache, dizziness, syncope, paralysis, ataxia, numbness or tingling in the extremities. No change in bowel or bladder control.  MUSCULOSKELETAL:  No muscle, back pain, joint pain or stiffness.  HEMATOLOGIC:  No anemia, bleeding or bruising.  LYMPHATICS:  No enlarged nodes. No history of splenectomy.  PSYCHIATRIC:  No history of depression or anxiety.  ENDOCRINOLOGIC:  No reports of sweating, cold or heat intolerance. No polyuria or polydipsia.  ALLERGIES:  No history of asthma, hives, eczema or rhinitis      Vital Signs Last 24 Hrs  T(C): 36.4 (26 Sep 2017 15:41), Max: 36.7 (26 Sep 2017 08:00)  T(F): 97.6 (26 Sep 2017 15:41), Max: 98 (26 Sep 2017 08:00)  HR: 66 (26 Sep 2017 15:41) (66 - 85)  BP: 126/66 (26 Sep 2017 15:41) (123/73 - 153/77)  BP(mean): --  RR: 18 (26 Sep 2017 15:41) (16 - 18)  SpO2: 94% (26 Sep 2017 15:41) (92% - 96%)    General Appearance:  Comfortable, NAD  HEENT: PERRLA, EOMI, Fundi not visualized, Pharynx clear, JVP 5 cm, Carotid pulses 2+ B/L, no bruit  Chest: Clear to ascultation B/L  Left infraclavicular PPM   CV: PMI not displaced, S1S2 normal, No S3/S4 or murmur  Abd: Soft, NT, no mass, pulsation or bruit  Extrem: Radial and femoral pulses 2+ B/L.  No edema  Neuro: A+O X3,  Motor grossly intact  skin-jaundice          CBC Full  -  ( 26 Sep 2017 07:18 )  WBC Count : 11.4 K/uL  Hemoglobin : 10.4 g/dL  Hematocrit : 32.3 %  Platelet Count - Automated : 356 K/uL  Mean Cell Volume : 98.5 fl  Mean Cell Hemoglobin : 31.7 pg  Mean Cell Hemoglobin Concentration : 32.2 g/dL  Auto Neutrophil # : x  Auto Lymphocyte # : x  Auto Monocyte # : x  Auto Eosinophil # : x  Auto Basophil # : x  Auto Neutrophil % : x  Auto Lymphocyte % : x  Auto Monocyte % : x  Auto Eosinophil % : x  Auto Basophil % : x            09-26    144  |  104  |  28.0<H>  ----------------------------<  128<H>  2.9<LL>   |  27.0  |  0.71    Ca    8.3<L>      26 Sep 2017 07:18  Phos  3.0     09-26  Mg     1.4     09-26    TPro  6.3<L>  /  Alb  3.1<L>  /  TBili  4.2<H>  /  DBili  x   /  AST  26  /  ALT  18  /  AlkPhos  264<H>  09-26        stone on common bile duct- abd CT  echo nov 2015 normal study HPI:  89 year old woman past medical history of PAF,SSS,  CVA , HTN, Parkinsons DZ presented to the ED with  complaints of jaundice. As per patient states this is the first time this has occurred. In the past the patient has had a bout of gallstone pancreatitis, during that admission the option of cholecystectomy was given and the patient refused it. She states the jaundice has been present for 4 days now. Did admit to having abdominal pain and nausea in the past couple of days, but currently does not have abdominal pain. Denies any recent travel nor sick contacts. Denies any pruritis  fever, vomiting, chest pain nor shortness of breathe, dysuria (25 Sep 2017 20:47).  Walks with a walker.  Previously deemed not a candidate for AC due to significant fall risk or to any interventional devices like Watchman LA occluder due to frailness and comorbidities including advanced age.  Ramipril was stopped 1 month ago due to hypotension.  Metoprolol         PAST MEDICAL & SURGICAL HISTORY:  Afib  Gout  Parkinsons  Pacemaker- generator replacement MDT -aug 2017  High cholesterol  CVA (cerebral infarction)  Hypertension  Adjustment and management of cardiac pacemaker      Medication: MEDICATIONS  (STANDING):  dextrose 5% + sodium chloride 0.9%. 1000 milliLiter(s) (75 mL/Hr) IV Continuous <Continuous>  ertapenem  IVPB      ertapenem  IVPB 1000 milliGRAM(s) IV Intermittent every 24 hours  levothyroxine 125 MICROGram(s) Oral daily  artificial  tears Solution 1 Drop(s) Both EYES two times a day  sertraline 50 milliGRAM(s) Oral daily  pantoprazole    Tablet 40 milliGRAM(s) Oral before breakfast  allopurinol 100 milliGRAM(s) Oral daily  saccharomyces boulardii 250 milliGRAM(s) Oral two times a day  metoprolol 100 milliGRAM(s) Oral daily  potassium chloride    Tablet ER 40 milliEquivalent(s) Oral every 4 hours    MEDICATIONS  (PRN):      Social hx: Negative tobacco, alcohol, herbal, and recreational substance use    Allergies: penicillins (Unknown)      FAMILY HISTORY:  No pertinent family history in first degree relatives      REVIEW OF SYSTEMS:    CONSTITUTIONAL:  No weight loss, fever, chills, weakness or fatigue.  HEENT:  Eyes:  No visual loss, blurred vision, double vision or yellow sclerae. Ears, Nose, Throat:  No hearing loss, sneezing, congestion, runny nose or sore throat.  SKIN:  No rash or itching.  CARDIOVASCULAR:  No chest pain, chest pressure or chest discomfort. No palpitations or edema.  RESPIRATORY:  No shortness of breath, NOBLE, cough or sputum.  GASTROINTESTINAL:  No anorexia, nausea, vomiting or diarrhea. No abdominal pain or blood.  GENITOURINARY:  No dysuria, urgency, or frequency  NEUROLOGICAL:  No headache, dizziness, syncope, paralysis, ataxia, numbness or tingling in the extremities. No change in bowel or bladder control.  MUSCULOSKELETAL:  No muscle, back pain, joint pain or stiffness.  HEMATOLOGIC:  No anemia, bleeding or bruising.  LYMPHATICS:  No enlarged nodes. No history of splenectomy.  PSYCHIATRIC:  No history of depression or anxiety.  ENDOCRINOLOGIC:  No reports of sweating, cold or heat intolerance. No polyuria or polydipsia.  ALLERGIES:  No history of asthma, hives, eczema or rhinitis      Vital Signs Last 24 Hrs  T(C): 36.4 (26 Sep 2017 15:41), Max: 36.7 (26 Sep 2017 08:00)  T(F): 97.6 (26 Sep 2017 15:41), Max: 98 (26 Sep 2017 08:00)  HR: 66 (26 Sep 2017 15:41) (66 - 85)  BP: 126/66 (26 Sep 2017 15:41) (123/73 - 153/77)  BP(mean): --  RR: 18 (26 Sep 2017 15:41) (16 - 18)  SpO2: 94% (26 Sep 2017 15:41) (92% - 96%)    General Appearance:  Comfortable, NAD  HEENT: PERRLA, EOMI, Fundi not visualized, Pharynx clear, JVP 5 cm, Carotid pulses 2+ B/L, no bruit  Chest: Clear to ascultation B/L  Left infraclavicular PPM   CV: PMI not displaced, S1S2 normal and irreg, No S3/S4 or murmur  Abd: Soft, NT, no mass, pulsation or bruit  Extrem: Radial and femoral pulses 2+ B/L.  No edema  Neuro: A+O X3,  Motor grossly intact  skin-jaundice          CBC Full  -  ( 26 Sep 2017 07:18 )  WBC Count : 11.4 K/uL  Hemoglobin : 10.4 g/dL  Hematocrit : 32.3 %  Platelet Count - Automated : 356 K/uL  Mean Cell Volume : 98.5 fl  Mean Cell Hemoglobin : 31.7 pg  Mean Cell Hemoglobin Concentration : 32.2 g/dL  Auto Neutrophil # : x  Auto Lymphocyte # : x  Auto Monocyte # : x  Auto Eosinophil # : x  Auto Basophil # : x  Auto Neutrophil % : x  Auto Lymphocyte % : x  Auto Monocyte % : x  Auto Eosinophil % : x  Auto Basophil % : x            09-26    144  |  104  |  28.0<H>  ----------------------------<  128<H>  2.9<LL>   |  27.0  |  0.71    Ca    8.3<L>      26 Sep 2017 07:18  Phos  3.0     09-26  Mg     1.4     09-26    TPro  6.3<L>  /  Alb  3.1<L>  /  TBili  4.2<H>  /  DBili  x   /  AST  26  /  ALT  18  /  AlkPhos  264<H>  09-26        stone on common bile duct- abd CT  echo nov 2015 normal study  ecg july 2017- afib intrinsic conduction and competing vent pacing   CXR   dual ch PPM present right pleural effusion/atelectasis=- present on previous CXR

## 2017-09-26 NOTE — PROGRESS NOTE ADULT - PROBLEM SELECTOR PLAN 6
- continue home medication  - Sertaline 125mcg once daily On ct noted patient has Left renal upper lobe pole calculus with a cortical atrophy- Currently patient denies any urinary symptoms

## 2017-09-26 NOTE — PROGRESS NOTE ADULT - SUBJECTIVE AND OBJECTIVE BOX
The patient is a 89y old female who presents with a complaint of jaundice starting 1 day prior   to admission on 2017.  Today on 2017 the patient does not appear  to be as jaundiced according to her daughter.  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  Hypertension      PAST SURGICAL HISTORY:  Adjustment and management of the cardiac pacemaker.  No other past surgical history.      MEDICATIONS  (STANDING):  dextrose 5% + sodium chloride 0.9%. 1000 milliLiter(s) (75 mL/Hr) IV Continuous <Continuous>  ertapenem  IVPB      ertapenem  IVPB 1000 milliGRAM(s) IV Intermittent every 24 hours  levothyroxine 125 MICROGram(s) Oral daily  artificial  tears Solution 1 Drop(s) Both EYES two times a day  sertraline 50 milliGRAM(s) Oral daily  pantoprazole    Tablet 40 milliGRAM(s) Oral before breakfast  allopurinol 100 milliGRAM(s) Oral daily  saccharomyces boulardii 250 milliGRAM(s) Oral two times a day  metoprolol 100 milliGRAM(s) Oral daily    MEDICATIONS  (PRN):      Allergies:   penicillins (hives)      SOCIAL HISTORY:  The patient does not drink alcohol, smoke or use illicit drugs.                          10.4   11.4  )-----------( 356      ( 26 Sep 2017 07:18 )             32.3       PT/INR - ( 25 Sep 2017 15:17 )   PT: 14.4 sec;   INR: 1.30 ratio         PTT - ( 25 Sep 2017 15:17 )  PTT:33.6 sec        144  |  104  |  28.0<H>  ----------------------------<  128<H>  2.9<LL>   |  27.0  |  0.71    Ca    8.3<L>      26 Sep 2017 07:18  Phos  3.0       Mg     1.4         TPro  6.3<L>  /  Alb  3.1<L>  /  TBili  4.2<H>  /  DBili  x   /  AST  26  /  ALT  18  /  AlkPhos  264<H>      EK2017  Atrial fibrillation  ST & T wave abnormality, consider lateral ischemia  Abnormal ECG    TT ECHO: 2017   1. Left ventricular ejection fraction, by visual estimation, is 55 to        60%.   2. Normal global left ventricular systolic function.   3. The mitral in-flow pattern reveals no discernable A-wave, therefore        no comment on diastolic function can be made.   4. Mildly dilated right atrium.   5. Mild mitral annular calcification.   6. Thickening and calcification of the anterior and posterior mitral        valve leaflets.   7. Mild-moderate tricuspid regurgitation.   8. Mild aortic regurgitation.   9. Sclerotic aortic valve with normal opening.  10. Estimated pulmonary artery systolic pressure is 50.9 mmHg assuming a         right atrial pressure of 15 mmHg, which is consistent with moderate         pulmonary hypertension.  11. Moderately enlarged left atrium.    CT ABDOMEN AND PELVIS - 2017  FINDINGS:   There is a distal common bile duct stone causing mild dilatation which   measures 9 mm in diameter. There is cholelithiasis with gallstones. No   evidence of acute cholecystitis seen. Gallbladder is distended.  IMPRESSION:   Extrahepatic biliary duct obstruction caused by distal common bile duct   stone measuring 2-3 mm.    US GALLBLADDER - 2017  FINDINGS:   Gallbladder stone bodies size ranges are from a 0.7 and a 0.8 cm.  The common duct measures 4.0 mm in maximum diameter.    ASA # = 3   Mallampati # = 2  (top teeth = a denture, lower teeth are intact) The patient is a 89y old female who presents with a complaint of jaundice starting 1 day prior   to admission on 2017.  Today on 2017 the patient does not appear  to be as jaundiced according to her daughter.  Thr patient is scheduled for an ENDOSCOPIC   RETROGRADE CHOLANGIOPANCREATOGRAM.  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  Hypertension      PAST SURGICAL HISTORY:  Adjustment and management of the cardiac pacemaker.  No other past surgical history.      MEDICATIONS  (STANDING):  dextrose 5% + sodium chloride 0.9%. 1000 milliLiter(s) (75 mL/Hr) IV Continuous <Continuous>  ertapenem  IVPB      ertapenem  IVPB 1000 milliGRAM(s) IV Intermittent every 24 hours  levothyroxine 125 MICROGram(s) Oral daily  artificial  tears Solution 1 Drop(s) Both EYES two times a day  sertraline 50 milliGRAM(s) Oral daily  pantoprazole    Tablet 40 milliGRAM(s) Oral before breakfast  allopurinol 100 milliGRAM(s) Oral daily  saccharomyces boulardii 250 milliGRAM(s) Oral two times a day  metoprolol 100 milliGRAM(s) Oral daily    MEDICATIONS  (PRN):      Allergies:   penicillins (hives)      SOCIAL HISTORY:  The patient does not drink alcohol, smoke or use illicit drugs.                          10.4   11.4  )-----------( 356      ( 26 Sep 2017 07:18 )             32.3       PT/INR - ( 25 Sep 2017 15:17 )   PT: 14.4 sec;   INR: 1.30 ratio         PTT - ( 25 Sep 2017 15:17 )  PTT:33.6 sec        144  |  104  |  28.0<H>  ----------------------------<  128<H>  2.9<LL>   |  27.0  |  0.71    Ca    8.3<L>      26 Sep 2017 07:18  Phos  3.0       Mg     1.4         TPro  6.3<L>  /  Alb  3.1<L>  /  TBili  4.2<H>  /  DBili  x   /  AST  26  /  ALT  18  /  AlkPhos  264<H>      EK2017  Atrial fibrillation  ST & T wave abnormality, consider lateral ischemia  Abnormal ECG    TT ECHO: 2017   1. Left ventricular ejection fraction, by visual estimation, is 55 to        60%.   2. Normal global left ventricular systolic function.   3. The mitral in-flow pattern reveals no discernable A-wave, therefore        no comment on diastolic function can be made.   4. Mildly dilated right atrium.   5. Mild mitral annular calcification.   6. Thickening and calcification of the anterior and posterior mitral        valve leaflets.   7. Mild-moderate tricuspid regurgitation.   8. Mild aortic regurgitation.   9. Sclerotic aortic valve with normal opening.  10. Estimated pulmonary artery systolic pressure is 50.9 mmHg assuming a         right atrial pressure of 15 mmHg, which is consistent with moderate         pulmonary hypertension.  11. Moderately enlarged left atrium.    CT ABDOMEN AND PELVIS - 2017  FINDINGS:   There is a distal common bile duct stone causing mild dilatation which   measures 9 mm in diameter. There is cholelithiasis with gallstones. No   evidence of acute cholecystitis seen. Gallbladder is distended.  IMPRESSION:   Extrahepatic biliary duct obstruction caused by distal common bile duct   stone measuring 2-3 mm.    US GALLBLADDER - 2017  FINDINGS:   Gallbladder stone bodies size ranges are from a 0.7 and a 0.8 cm.  The common duct measures 4.0 mm in maximum diameter.    ASA # = 3   Mallampati # = 2  (top teeth = a denture, lower teeth are intact)

## 2017-09-26 NOTE — PROGRESS NOTE ADULT - ATTENDING COMMENTS
Note addended where needed. Plan discussed with patient and resident team.   resident team to call family at bedside

## 2017-09-26 NOTE — CONSULT NOTE ADULT - SUBJECTIVE AND OBJECTIVE BOX
HPI:  89 year old woman past medical history of , Afib(not on anticoagulation)  gout, depression, CVA hyperlipidemia, HTN, Parkinson, hypothyroidism who is living in a assisted care facility presented to the ED with her daughter of complaints of yellowing of the skin. As per patient states this is the first time this has occurred. In the past the patient has had a bout of gallstone pancreatitis, during that admission the option of cholecystectomy was given and the patient refused it. She states the jaundice has been present for 4 days now. Did admit to having abdominal pain in the past couple of days in the upper abdomen after eating, but currently does not have abdominal pain. Denies any recent travel nor sick contacts. Denies any pruritis  fever, nausea, vomiting, chest pain nor shortness of breathe, dysuria.    She was noted to have cholelithiasis and choledocholithiasis. Lipase was mildly elevated.    PAST MEDICAL & SURGICAL HISTORY:  Afib  Gout  Parkinsons  Pacemaker  High cholesterol  CVA (cerebral infarction)  Hypertension  Adjustment and management of cardiac pacemaker      ROS:  No Heartburn, regurgitation, dysphagia, odynophagia.  No dyspepsia  +Abdominal pain.    No Nausea, vomiting.  No Bleeding.  No hematemesis.   No diarrhea.    No hematochezia  No weight loss, anorexia.  No edema.      HOME MEDICATIONS  Allopurinol  Bumetanide  Synthroid  Sertraline  Toprol XL  Protonix    MEDICATIONS  (STANDING):  dextrose 5% + sodium chloride 0.9%. 1000 milliLiter(s) (75 mL/Hr) IV Continuous <Continuous>  ertapenem  IVPB      ertapenem  IVPB 1000 milliGRAM(s) IV Intermittent every 24 hours  levothyroxine 125 MICROGram(s) Oral daily  artificial  tears Solution 1 Drop(s) Both EYES two times a day  sertraline 50 milliGRAM(s) Oral daily  pantoprazole    Tablet 40 milliGRAM(s) Oral before breakfast  allopurinol 100 milliGRAM(s) Oral daily  saccharomyces boulardii 250 milliGRAM(s) Oral two times a day  metoprolol 100 milliGRAM(s) Oral daily    MEDICATIONS  (PRN):      Allergies    penicillins (Unknown)    Intolerances        SOCIAL HISTORY: Denies x 3    ENDOSCOPIC/GI HISTORY: No ERCP in the past.    FAMILY HISTORY:  No pertinent family history in first degree relatives      Vital Signs Last 24 Hrs  T(C): 36.6 (26 Sep 2017 00:56), Max: 36.6 (25 Sep 2017 19:05)  T(F): 97.8 (26 Sep 2017 00:56), Max: 97.8 (25 Sep 2017 19:05)  HR: 76 (26 Sep 2017 05:30) (72 - 93)  BP: 128/74 (26 Sep 2017 05:30) (123/73 - 153/77)  BP(mean): --  RR: 18 (26 Sep 2017 05:30) (16 - 18)  SpO2: 95% (26 Sep 2017 05:30) (92% - 96%)    PHYSICAL EXAM:    GENERAL: NAD, well-groomed, well-developed  HEAD:  Atraumatic, Normocephalic  EYES: EOMI, PERRLA, conjunctiva and sclera clear  ENMT: No tonsillar erythema, exudates, or enlargement; Moist mucous membranes, Poor dentition, partial dentures No lesions. hearing aid in place  NECK: Supple, No JVD, Normal thyroid  CHEST/LUNG: Clear to percussion bilaterally; No rales, rhonchi, wheezing, or rubs  HEART: Regular rate and rhythm; No murmurs, rubs, or gallops  ABDOMEN: Soft, Nontender, Nondistended; Bowel sounds present  RECTAL: Refused  EXTREMITIES:  2+ Peripheral Pulses, No clubbing, cyanosis, or edema  LYMPH: No lymphadenopathy noted  SKIN: No rashes or lesions      LABS:                        11.4   11.1  )-----------( 389      ( 25 Sep 2017 15:17 )             34.0     -    144  |  104  |  28.0<H>  ----------------------------<  128<H>  2.9<LL>   |  27.0  |  0.71    Ca    8.3<L>      26 Sep 2017 07:18  Phos  3.0     -  Mg     1.4         TPro  6.3<L>  /  Alb  3.1<L>  /  TBili  4.2<H>  /  DBili  x   /  AST  26  /  ALT  18  /  AlkPhos  264<H>  -    PT/INR - ( 25 Sep 2017 15:17 )   PT: 14.4 sec;   INR: 1.30 ratio         PTT - ( 25 Sep 2017 15:17 )  PTT:33.6 sec   Urinalysis Basic - ( 25 Sep 2017 17:00 )    Color: Yellow / Appearance: Clear / S.005 / pH: x  Gluc: x / Ketone: Negative  / Bili: Negative / Urobili: 4 mg/dL   Blood: x / Protein: Negative mg/dL / Nitrite: Positive   Leuk Esterase: Trace / RBC: 0-2 /HPF / WBC 3-5   Sq Epi: x / Non Sq Epi: x / Bacteria: Few        LIVER FUNCTIONS - ( 26 Sep 2017 07:18 )  Alb: 3.1 g/dL / Pro: 6.3 g/dL / ALK PHOS: 264 U/L / ALT: 18 U/L / AST: 26 U/L / GGT: x               RADIOLOGY & ADDITIONAL STUDIES:  < from: CT Abdomen and Pelvis w/ Oral Cont and w/ IV Cont (17 @ 17:58) >  FINDINGS:   There is a distal common bile duct stone causing mild dilatation which   measures 9 mm in diameter. There is cholelithiasis with gallstones. No   evidence of acute cholecystitis seen. Gallbladder is distended.  No gross intrahepatic biliary duct dilatation seen.  Liver parenchyma shows no focal mass.  There is a moderate right pleural effusion with right lower lobe   atelectasis. There is no free intra-abdominal air orascites.    The spleen, pancreas, adrenal glands,are normal.      There is diverticulosis of the sigmoid colon without mass or obstruction.   Remaining large bowel, appendix, small bowel, and noncontrast   nondistended stomach and duodenum are grossly within normal limits.  .    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 No   evidence of hydronephrosis. Numerous bilateral small cysts identified.  There is a left upper pole area of the cortical lung calyceal   diverticulum or a large M calyceal stone with atrophy of the upper lobe   cortex. No hydronephrosis. A similar on calculus is seen within the lower   pole of the left kidney versus a calyceal diverticulum. The ureters are   normal in course and caliber. Bladderunremarkable.     The reproductive organs are within normal limits.       There are no retroperitoneal masses or abnormal lymphadenopathy.  The retroperitoneal vessels show atherosclerotic calcified plaques   throughout  nondilated abdominal aorta and iliac arteries.      There is a degenerative spondylosis and chronic 10% wedge compression   fracture of the T11 vertebra without paraspinal hematoma.      IMPRESSION:   Extrahepatic biliary duct obstruction caused by distal common bile duct   stonemeasuring 2-3 mm.  Moderate right effusion with right lower lobe atelectasis. A  Left renal upper lobe pole calculus with a cortical atrophy     < end of copied text >    < from: US Gallbladder (17 @ 16:10) >  INTERPRETATION:  Right upper quadrant gallbladder and common bile duct   ultrasound.    Comparison: None.    Clinical Indication: Right upper quadrant pain.. Assess for   cholelithiasis or acute cholecystitis.    FINDINGS:     The gallbladder  contains multiple mobile gallstones and sludge.   Gallbladder stone bodies size ranges are from a 0.7 and a 0.8 cm.  There is no gallbladder wall thickening.   The commonduct measures 4.0 mm in maximum diameter.    No tenderness was elicited with direct compression by the transducer; no   sonographic Lewis's sign identified.      IMPRESSION:     Cholelithiasis.    < end of copied text >  < from: US Gallbladder (17 @ 16:10) >  INTERPRETATION:  Right upper quadrant gallbladder and common bile duct   ultrasound.    Comparison: None.    Clinical Indication: Right upper quadrant pain.. Assess for   cholelithiasis or acute cholecystitis.    FINDINGS:     The gallbladder  contains multiple mobile gallstones and sludge.   Gallbladder stone bodies size ranges are from a 0.7 and a 0.8 cm.  There is no gallbladder wall thickening.   The commonduct measures 4.0 mm in maximum diameter.    No tenderness was elicited with direct compression by the transducer; no   sonographic Lewis's sign identified.      IMPRESSION:     Cholelithiasis.

## 2017-09-26 NOTE — PROGRESS NOTE ADULT - SUBJECTIVE AND OBJECTIVE BOX
Vital Signs Last 24 Hrs  T(C): 36.6 (26 Sep 2017 00:56), Max: 36.6 (25 Sep 2017 19:05)  T(F): 97.8 (26 Sep 2017 00:56), Max: 97.8 (25 Sep 2017 19:05)  HR: 76 (26 Sep 2017 05:30) (72 - 93)  BP: 128/74 (26 Sep 2017 05:30) (123/73 - 153/77)  BP(mean): --  RR: 18 (26 Sep 2017 05:30) (16 - 18)  SpO2: 95% (26 Sep 2017 05:30) (92% - 96%)    CONSTITUTIONAL: No fever, chills, weight loss, or fatigue  EYES: No eye pain, visual disturbances, or discharge  ENMT:  No difficulty hearing, tinnitus, vertigo; No sinus or throat pain  NECK: No pain or stiffness  RESPIRATORY: No cough, wheezing, or shortness of breath  CARDIOVASCULAR: No chest pain, palpitations, dizziness, or leg swelling  GASTROINTESTINAL: No abdominal or epigastric pain. No nausea, vomiting, diarrhea. No melena or hematochezia.  GENITOURINARY: No dysuria, no hematuria  NEUROLOGICAL: No changes in strength/sensation   SKIN: No itching, burning, rashes, or lesions   LYMPH NODES: No enlarged glands  ENDOCRINE: No heat or cold intolerance; No hair loss  MUSCULOSKELETAL: No joint pain or swelling; No muscle, back, or extremity pain  PSYCHIATRIC: No depression, anxiety, mood swings	  HEME/LYMPH: No easy bruising, or bleeding gums        General: Elderly lady laying bed side, in non acute distress.   Neurology: A&Ox3, nonfocal, CHILEL x 4  eye: sclera icterus  ENT:  Mucosa moist, no ulcerations-  Neck:  Supple, no sinuses or palpable masses  Lymphatic:  No palpable cervical, supraclavicular, axillary or inguinal adenopathy  Respiratory: crackles noted bi-laterally base of the lungs. HPI: 89 year old woman past medical history of , Afib(not on anticoagulation)  gout, depression, CVA hyperlipidemia, HTN, Parkinson, hypothyroidism presented to the ED with her daughter of complaints of yellowing of the skin. As per patient states this is the first time this has occurred. In the past the patient has had a bout of gallstone pancreatitis, during that admission the option of cholecystectomy was given and the patient refused it. She states the jaundice has been present for 4 days now. Did admit to having abdominal pain in the past couple of days, but currently does not have abdominal pain. Denies any recent travel nor sick contacts. Denies any pruritis  fever, nausea, vomiting, chest pain nor shortness of breathe, dysuria     S: no acute events over night. Patient has not had bowel movement. Patient has been voiding well.  Patient is currently NPO.  As per nursing notes a Stage 1 pressure ulcer sacral region.           Vital Signs Last 24 Hrs  T(C): 36.6 (26 Sep 2017 00:56), Max: 36.6 (25 Sep 2017 19:05)  T(F): 97.8 (26 Sep 2017 00:56), Max: 97.8 (25 Sep 2017 19:05)  HR: 76 (26 Sep 2017 05:30) (72 - 93)  BP: 128/74 (26 Sep 2017 05:30) (123/73 - 153/77)  BP(mean): --  RR: 18 (26 Sep 2017 05:30) (16 - 18)  SpO2: 95% (26 Sep 2017 05:30) (92% - 96%)    CONSTITUTIONAL: No fever, chills, weight loss, or fatigue  EYES: No eye pain, visual disturbances, or discharge  ENMT:  No difficulty hearing, tinnitus, vertigo; No sinus or throat pain  NECK: No pain or stiffness  RESPIRATORY: No cough, wheezing, or shortness of breath  CARDIOVASCULAR: No chest pain, palpitations, dizziness, or leg swelling  GASTROINTESTINAL: No abdominal or epigastric pain. No nausea, vomiting, diarrhea. No melena or hematochezia.  GENITOURINARY: No dysuria, no hematuria  NEUROLOGICAL: No changes in strength/sensation   SKIN: No itching, burning, rashes, or lesions   LYMPH NODES: No enlarged glands  ENDOCRINE: No heat or cold intolerance; No hair loss  MUSCULOSKELETAL: No joint pain or swelling; No muscle, back, or extremity pain  PSYCHIATRIC: No depression, anxiety, mood swings	  HEME/LYMPH: No easy bruising, or bleeding gums        General: Elderly lady laying bed side, in non acute distress. Resting tremor noted  Neurology: A&Ox3, nonfocal, CHILEL x 4  eye: sclera icterus  ENT:  Mucosa moist, no ulcerations-  Neck:  Supple, no sinuses or palpable masses  Lymphatic:  No palpable cervical, supraclavicular, axillary or inguinal adenopathy  Respiratory: crackles noted bi-laterally base of the lungs.   Skin: jaundice noted. Stage 1 on sacral HPI: 89 year old woman past medical history of , Afib(not on anticoagulation)  gout, depression, CVA hyperlipidemia, HTN, Parkinson, hypothyroidism presented to the ED with her daughter of complaints of yellowing of the skin. As per patient states this is the first time this has occurred. In the past the patient has had a bout of gallstone pancreatitis, during that admission the option of cholecystectomy was given and the patient refused it. She states the jaundice has been present for 4 days now. Did admit to having abdominal pain in the past couple of days, but currently does not have abdominal pain. Denies any recent travel nor sick contacts. Denies any pruritis  fever, nausea, vomiting, chest pain nor shortness of breathe, dysuria     S: no acute events over night. Patient has not had bowel movement. Patient has been voiding well.  Patient is currently NPO.  As per nursing notes a Stage 1 pressure ulcer sacral region.           Vital Signs Last 24 Hrs  T(C): 36.6 (26 Sep 2017 00:56), Max: 36.6 (25 Sep 2017 19:05)  T(F): 97.8 (26 Sep 2017 00:56), Max: 97.8 (25 Sep 2017 19:05)  HR: 76 (26 Sep 2017 05:30) (72 - 93)  BP: 128/74 (26 Sep 2017 05:30) (123/73 - 153/77)  BP(mean): --  RR: 18 (26 Sep 2017 05:30) (16 - 18)  SpO2: 95% (26 Sep 2017 05:30) (92% - 96%)    CONSTITUTIONAL: No fever, chills, weight loss, or fatigue  EYES: No eye pain, visual disturbances, or discharge  ENMT:  No difficulty hearing, tinnitus, vertigo; No sinus or throat pain  NECK: No pain or stiffness  RESPIRATORY: No cough, wheezing, or shortness of breath  CARDIOVASCULAR: No chest pain, palpitations, dizziness, or leg swelling  GASTROINTESTINAL: No abdominal or epigastric pain. No nausea, vomiting, diarrhea. No melena or hematochezia.  GENITOURINARY: No dysuria, no hematuria  NEUROLOGICAL: No changes in strength/sensation   SKIN: No itching, burning, rashes, or lesions   LYMPH NODES: No enlarged glands  ENDOCRINE: No heat or cold intolerance; No hair loss  MUSCULOSKELETAL: No joint pain or swelling; No muscle, back, or extremity pain  PSYCHIATRIC: No depression, anxiety, mood swings	  HEME/LYMPH: No easy bruising, or bleeding gums        General: Elderly lady laying bed side, in non acute distress. Resting tremor noted  Neurology: A&Ox3, nonfocal, CHILEL x 4  eye: sclera icterus  ENT:  Mucosa moist, no ulcerations-  Neck:  Supple, no sinuses or palpable masses  Lymphatic:  No palpable cervical, supraclavicular, axillary or inguinal adenopathy  Respiratory: crackles noted bi-laterally base of the lungs.   Skin: jaundice noted. Stage 1 on sacral, eccymosis on left arm                              11.4   11.1  )-----------( 389      ( 25 Sep 2017 15:17 )             34.0     25 Sep 2017 15:17    142    |  98     |  35.0   ----------------------------<  90     3.7     |  29.0   |  0.76     Ca    9.0        25 Sep 2017 15:17    TPro  7.3    /  Alb  3.7    /  TBili  6.0    /  DBili  x      /  AST  31     /  ALT  21     /  AlkPhos  310    25 Sep 2017 15:17    LIVER FUNCTIONS - ( 25 Sep 2017 15:17 )  Alb: 3.7 g/dL / Pro: 7.3 g/dL / ALK PHOS: 310 U/L / ALT: 21 U/L / AST: 31 U/L / GGT: x           PT/INR - ( 25 Sep 2017 15:17 )   PT: 14.4 sec;   INR: 1.30 ratio         PTT - ( 25 Sep 2017 15:17 )  PTT:33.6 sec  CAPILLARY BLOOD GLUCOSE            Urinalysis Basic - ( 25 Sep 2017 17:00 )    Color: Yellow / Appearance: Clear / S.005 / pH: x  Gluc: x / Ketone: Negative  / Bili: Negative / Urobili: 4 mg/dL   Blood: x / Protein: Negative mg/dL / Nitrite: Positive   Leuk Esterase: Trace / RBC: 0-2 /HPF / WBC 3-5   Sq Epi: x / Non Sq Epi: x / Bacteria: Few      < from: CT Abdomen and Pelvis w/ Oral Cont and w/ IV Cont (17 @ 17:58) >    IMPRESSION:   Extrahepatic biliary duct obstruction caused by distal common bile duct   stonemeasuring 2-3 mm.  Moderate right effusion with right lower lobe atelectasis. A  Left renal upper lobe pole calculus with a cortical atrophy   nonobstructing as described.   Chronic T11 vertebral fracture.    < end of copied text >        MEDICATIONS  (STANDING):  dextrose 5% + sodium chloride 0.9%. 1000 milliLiter(s) (75 mL/Hr) IV Continuous <Continuous>  ertapenem  IVPB      ertapenem  IVPB 1000 milliGRAM(s) IV Intermittent every 24 hours  levothyroxine 125 MICROGram(s) Oral daily  artificial  tears Solution 1 Drop(s) Both EYES two times a day  sertraline 50 milliGRAM(s) Oral daily  pantoprazole    Tablet 40 milliGRAM(s) Oral before breakfast  allopurinol 100 milliGRAM(s) Oral daily  saccharomyces boulardii 250 milliGRAM(s) Oral two times a day  metoprolol 100 milliGRAM(s) Oral daily    MEDICATIONS  (PRN): Patient is a 89y old  Female who presents with a chief complaint of Jaundice (25 Sep 2017 20:47)    Patient seen and examined at bedside. No acute distress and no acute overnight events. She states that she doesn't feel well, her abdomen still bothers her. She is hungry/thirsty. No pain at this time     ROS: No chest pain, palpitations, sob, light headedness/dizziness, difficulty breathing/cough, fevers/chills,n/v, diarrhea/constipation, dysuria or increased urinary frequency.,    Vital Signs Last 24 Hrs  T(C): 36.6 (26 Sep 2017 00:56), Max: 36.6 (25 Sep 2017 19:05)  T(F): 97.8 (26 Sep 2017 00:56), Max: 97.8 (25 Sep 2017 19:05)  HR: 76 (26 Sep 2017 05:30) (72 - 93)  BP: 128/74 (26 Sep 2017 05:30) (123/73 - 153/77)  BP(mean): --  RR: 18 (26 Sep 2017 05:30) (16 - 18)  SpO2: 95% (26 Sep 2017 05:30) (92% - 96%)      Physical exam:     General: Elderly lady laying bed side, in non acute distress. Resting tremor noted  Neurology: A&Ox3, nonfocal, CHILEL x 4  HEENT: sclera icterus  ENT:  Mucosa moist, no ulcerations-  Neck:  Supple, no sinuses or palpable masses  Abd: soft, mildly distended, NT and no guarding. b/l all quad with BS +  Lymphatic:  No palpable cervical, supraclavicular, axillary or inguinal adenopathy  Respiratory: crackles noted bi-laterally base of the lungs.   Skin: jaundice noted. Stage 1 on sacral, eccymosis on left arm      LABS                        11.4   11.1  )-----------( 389      ( 25 Sep 2017 15:17 )             34.0     25 Sep 2017 15:17    142    |  98     |  35.0   ----------------------------<  90     3.7     |  29.0   |  0.76     Ca    9.0        25 Sep 2017 15:17    TPro  7.3    /  Alb  3.7    /  TBili  6.0    /  DBili  x      /  AST  31     /  ALT  21     /  AlkPhos  310    25 Sep 2017 15:17    LIVER FUNCTIONS - ( 25 Sep 2017 15:17 )  Alb: 3.7 g/dL / Pro: 7.3 g/dL / ALK PHOS: 310 U/L / ALT: 21 U/L / AST: 31 U/L / GGT: x           PT/INR - ( 25 Sep 2017 15:17 )   PT: 14.4 sec;   INR: 1.30 ratio         PTT - ( 25 Sep 2017 15:17 )  PTT:33.6 sec  CAPILLARY BLOOD GLUCOSE            Urinalysis Basic - ( 25 Sep 2017 17:00 )    Color: Yellow / Appearance: Clear / S.005 / pH: x  Gluc: x / Ketone: Negative  / Bili: Negative / Urobili: 4 mg/dL   Blood: x / Protein: Negative mg/dL / Nitrite: Positive   Leuk Esterase: Trace / RBC: 0-2 /HPF / WBC 3-5   Sq Epi: x / Non Sq Epi: x / Bacteria: Few      < from: CT Abdomen and Pelvis w/ Oral Cont and w/ IV Cont (17 @ 17:58) >    IMPRESSION:   Extrahepatic biliary duct obstruction caused by distal common bile duct   stonemeasuring 2-3 mm.  Moderate right effusion with right lower lobe atelectasis. A  Left renal upper lobe pole calculus with a cortical atrophy   nonobstructing as described.   Chronic T11 vertebral fracture.    < end of copied text >        MEDICATIONS  (STANDING):  dextrose 5% + sodium chloride 0.9%. 1000 milliLiter(s) (75 mL/Hr) IV Continuous <Continuous>  ertapenem  IVPB      ertapenem  IVPB 1000 milliGRAM(s) IV Intermittent every 24 hours  levothyroxine 125 MICROGram(s) Oral daily  artificial  tears Solution 1 Drop(s) Both EYES two times a day  sertraline 50 milliGRAM(s) Oral daily  pantoprazole    Tablet 40 milliGRAM(s) Oral before breakfast  allopurinol 100 milliGRAM(s) Oral daily  saccharomyces boulardii 250 milliGRAM(s) Oral two times a day  metoprolol 100 milliGRAM(s) Oral daily    MEDICATIONS  (PRN):

## 2017-09-26 NOTE — PROGRESS NOTE ADULT - PROBLEM SELECTOR PLAN 2
EKG-  A-fib. Cardio consult has been placed. Patient has pacemaker in place.  Patient- not on anticoagulation due to prior history of of bleeding.  CHADsVAsc- 6.- points were given for Age, sex, history of HTN and CVA  has-bled- 4- points were given for HTN, age, prior history of bleeding and stroke. patient is high risk of bleeding  - patient will undergo ECHO EKG-  A-fib. Cardio consult has been placed. Patient has pacemaker in place.  Patient- not on anticoagulation due to prior history of of bleeding.  CHADsVAsc- 6.- points were given for Age, sex, history of HTN and CVA  has-bled- 4- points were given for HTN, age, prior history of bleeding and stroke. patient is high risk of bleeding  -d/c echo- had one 4 months ago   -cardio called by resident team, pending eval

## 2017-09-26 NOTE — PROGRESS NOTE ADULT - PROBLEM SELECTOR PLAN 4
-Moderate right effusion with right lower lobe atelectasis noted on CT. Consult is placed for CTS- patient is currently in non-distress. denies any chest pain nor shortness of breathe. -Moderate right effusion with right lower lobe atelectasis noted on CT. Consult is placed for CTS- patient is currently in non-distress. denies any chest pain nor shortness of breathe.  -no intervention likely

## 2017-09-26 NOTE — PROGRESS NOTE ADULT - PROBLEM SELECTOR PLAN 9
[facility] - Will order TSH, T4 serum in the am. Will adjust medication if warranted.   - continue home medication  - levothyroxine 50 mg  once daily.

## 2017-09-26 NOTE — PROGRESS NOTE ADULT - PROBLEM SELECTOR PLAN 8
- Will order TSH, T4 serum in the am. Will adjust medication if warranted.   - continue home medication  - levothyroxine 50 mg  once daily. patient current BP is 135/66.  Will continue home medications   - metoprolol XL- 100mg once daily.

## 2017-09-26 NOTE — PROGRESS NOTE ADULT - PROBLEM SELECTOR PLAN 5
On ct noted patient has Left renal upper lobe pole calculus with a cortical atrophy- Currently patient denies any urinary symptoms Patient current potassium is 2.9 patient was given 10meq/100ml per 1hours x3 doses. Stop after 3rd dose  - patient current mg is 1.4. patient is given 1mg IV  - patient current calcium is 8.3- corrected calcium is 9.0( within normal limits) Patient current potassium is 2.9 patient was given 10meq/100ml per 1hours x3 doses. Stop after 3rd dose  - patient current mg is 1.4. patient is given 1mg IV  - patient current calcium is 8.3- corrected calcium is 9.0( within normal limits)  f/up electrolytes this evening to maximize patient prior to planned egd in am

## 2017-09-26 NOTE — PROGRESS NOTE ADULT - PROBLEM SELECTOR PLAN 1
Admit under Dr. Prakash Service. Diet NPO after midnight. Activity: ambulate as tolerated. Vital per routine. Patient is currently stable- in non acute distress- jaundice- denies purities.   - IVF- D5 normal saline 75cc/hr  -Strict I/0  - Invanz- 1g daily  US- impression showed Cholelithiasis.  CT- Extrahepatic biliary duct obstruction caused by distal common bile duct   stonemeasuring 2-3 mm.  - GI consulted- Dr. Bravo- will see the patient in the moring- possible ERCP?  - Invanz- 1g daily plan to go for EGD in am afor dilatation or stone removal   -resume clears, npo after midnight   -pain control as needed  - IVF- D5 normal saline 75cc/hr  - Invanz- 1g daily with florastor for leucocytosis which is likely reactive. no fevers noted   - GI consulted- Dr. Bravo- will see the patient in the moring- possible ERCP?  - Invanz- 1g daily

## 2017-09-26 NOTE — PROGRESS NOTE ADULT - PROBLEM SELECTOR PLAN 3
current WBC is 11.1, absolute neutrophil count 65.1. The current wbc could be 2/2 stress reaction. There is no left shift present. current WBC is 11.1, absolute neutrophil count 65.1. The current wbc could be 2/2 stress reaction. There is no left shift present.  Will monitor and patient is already on invanz

## 2017-09-26 NOTE — PROGRESS NOTE ADULT - ASSESSMENT
Afib(not on anticoagulation)  gout, depression, CVA hyperlipidemia, HTN, Parkinson, hypothyroidism presented to the ED with her daughter of complaints of yellowing of the skin.            Jaundice 2/2 to Choledocholithiasis. Afib(not on anticoagulation)  gout, depression, CVA hyperlipidemia, HTN, Parkinson, hypothyroidism presented to the ED with her daughter of complaints of yellowing of the skin. Admitted for Choledocholithiasis.               Jaundice 2/2 to Choledocholithiasis. 89 year old woman past medical history of , Afib(not on anticoagulation)  gout, depression, CVA hyperlipidemia, HTN, Parkinson, hypothyroidism presented to the ED with her daughter of complaints of yellowing of the skin. As per patient states this is the first time this has occurred. In the past the patient has had a bout of gallstone pancreatitis, during that admission the option of cholecystectomy was given and the patient refused it. She states the jaundice has been present for 4 days now. Did admit to having abdominal pain in the past couple of days, but currently does not have abdominal pain. Denies any recent travel nor sick contacts. Denies any pruritis  fever, nausea, vomiting, chest pain nor shortness of breathe, dysuria       Jaundice 2/2 to Choledocholithiasis with an extrahepatic CBD obstructive stone

## 2017-09-27 LAB
ALBUMIN SERPL ELPH-MCNC: 3.1 G/DL — LOW (ref 3.3–5.2)
ALP SERPL-CCNC: 270 U/L — HIGH (ref 40–120)
ALT FLD-CCNC: 18 U/L — SIGNIFICANT CHANGE UP
ANION GAP SERPL CALC-SCNC: 11 MMOL/L — SIGNIFICANT CHANGE UP (ref 5–17)
ANISOCYTOSIS BLD QL: SLIGHT — SIGNIFICANT CHANGE UP
AST SERPL-CCNC: 23 U/L — SIGNIFICANT CHANGE UP
BILIRUB SERPL-MCNC: 3.4 MG/DL — HIGH (ref 0.4–2)
BUN SERPL-MCNC: 22 MG/DL — HIGH (ref 8–20)
CALCIUM SERPL-MCNC: 8.6 MG/DL — SIGNIFICANT CHANGE UP (ref 8.6–10.2)
CHLORIDE SERPL-SCNC: 111 MMOL/L — HIGH (ref 98–107)
CO2 SERPL-SCNC: 24 MMOL/L — SIGNIFICANT CHANGE UP (ref 22–29)
CREAT SERPL-MCNC: 0.63 MG/DL — SIGNIFICANT CHANGE UP (ref 0.5–1.3)
EOSINOPHIL NFR BLD AUTO: 2 % — SIGNIFICANT CHANGE UP (ref 0–5)
GLUCOSE SERPL-MCNC: 154 MG/DL — HIGH (ref 70–115)
HCT VFR BLD CALC: 32.9 % — LOW (ref 37–47)
HGB BLD-MCNC: 10.9 G/DL — LOW (ref 12–16)
HYPOCHROMIA BLD QL: SLIGHT — SIGNIFICANT CHANGE UP
LYMPHOCYTES # BLD AUTO: 13 % — LOW (ref 20–55)
MACROCYTES BLD QL: SLIGHT — SIGNIFICANT CHANGE UP
MAGNESIUM SERPL-MCNC: 1.7 MG/DL — SIGNIFICANT CHANGE UP (ref 1.6–2.6)
MCHC RBC-ENTMCNC: 32.5 PG — HIGH (ref 27–31)
MCHC RBC-ENTMCNC: 33.1 G/DL — SIGNIFICANT CHANGE UP (ref 32–36)
MCV RBC AUTO: 98.2 FL — SIGNIFICANT CHANGE UP (ref 81–99)
MICROCYTES BLD QL: SLIGHT — SIGNIFICANT CHANGE UP
MONOCYTES NFR BLD AUTO: 11 % — HIGH (ref 3–10)
NEUTROPHILS NFR BLD AUTO: 73 % — SIGNIFICANT CHANGE UP (ref 37–73)
OVALOCYTES BLD QL SMEAR: SLIGHT — SIGNIFICANT CHANGE UP
PHOSPHATE SERPL-MCNC: 2.4 MG/DL — SIGNIFICANT CHANGE UP (ref 2.4–4.7)
PLAT MORPH BLD: NORMAL — SIGNIFICANT CHANGE UP
PLATELET # BLD AUTO: 405 K/UL — HIGH (ref 150–400)
POIKILOCYTOSIS BLD QL AUTO: SLIGHT — SIGNIFICANT CHANGE UP
POTASSIUM SERPL-MCNC: 4.8 MMOL/L — SIGNIFICANT CHANGE UP (ref 3.5–5.3)
POTASSIUM SERPL-SCNC: 4.8 MMOL/L — SIGNIFICANT CHANGE UP (ref 3.5–5.3)
PROT SERPL-MCNC: 6.5 G/DL — LOW (ref 6.6–8.7)
RBC # BLD: 3.35 M/UL — LOW (ref 4.4–5.2)
RBC # FLD: 17.1 % — HIGH (ref 11–15.6)
RBC BLD AUTO: ABNORMAL
SODIUM SERPL-SCNC: 146 MMOL/L — HIGH (ref 135–145)
TSH SERPL-MCNC: 2.38 UIU/ML — SIGNIFICANT CHANGE UP (ref 0.27–4.2)
VARIANT LYMPHS # BLD: 1 % — SIGNIFICANT CHANGE UP (ref 0–6)
WBC # BLD: 13.4 K/UL — HIGH (ref 4.8–10.8)
WBC # FLD AUTO: 13.4 K/UL — HIGH (ref 4.8–10.8)

## 2017-09-27 PROCEDURE — 43262 ENDO CHOLANGIOPANCREATOGRAPH: CPT

## 2017-09-27 PROCEDURE — 99233 SBSQ HOSP IP/OBS HIGH 50: CPT | Mod: GC

## 2017-09-27 PROCEDURE — 43264 ERCP REMOVE DUCT CALCULI: CPT

## 2017-09-27 RX ORDER — ACETAMINOPHEN 500 MG
1000 TABLET ORAL ONCE
Qty: 0 | Refills: 0 | Status: COMPLETED | OUTPATIENT
Start: 2017-09-27 | End: 2017-09-27

## 2017-09-27 RX ORDER — INDOMETHACIN 50 MG
50 CAPSULE ORAL ONCE
Qty: 0 | Refills: 0 | Status: DISCONTINUED | OUTPATIENT
Start: 2017-09-27 | End: 2017-09-29

## 2017-09-27 RX ORDER — MAGNESIUM SULFATE 500 MG/ML
2 VIAL (ML) INJECTION ONCE
Qty: 0 | Refills: 0 | Status: COMPLETED | OUTPATIENT
Start: 2017-09-27 | End: 2017-09-27

## 2017-09-27 RX ORDER — METOPROLOL TARTRATE 50 MG
50 TABLET ORAL
Qty: 0 | Refills: 0 | Status: DISCONTINUED | OUTPATIENT
Start: 2017-09-27 | End: 2017-09-29

## 2017-09-27 RX ADMIN — Medication 50 MILLIGRAM(S): at 17:39

## 2017-09-27 RX ADMIN — Medication 400 MILLIGRAM(S): at 08:18

## 2017-09-27 RX ADMIN — Medication 125 MICROGRAM(S): at 06:08

## 2017-09-27 RX ADMIN — PANTOPRAZOLE SODIUM 40 MILLIGRAM(S): 20 TABLET, DELAYED RELEASE ORAL at 06:08

## 2017-09-27 RX ADMIN — Medication 1 DROP(S): at 17:39

## 2017-09-27 RX ADMIN — Medication 100 MILLIGRAM(S): at 06:08

## 2017-09-27 RX ADMIN — Medication 1000 MILLIGRAM(S): at 09:20

## 2017-09-27 RX ADMIN — Medication 1 DROP(S): at 06:08

## 2017-09-27 RX ADMIN — Medication 250 MILLIGRAM(S): at 06:08

## 2017-09-27 RX ADMIN — ERTAPENEM SODIUM 120 MILLIGRAM(S): 1 INJECTION, POWDER, LYOPHILIZED, FOR SOLUTION INTRAMUSCULAR; INTRAVENOUS at 21:39

## 2017-09-27 RX ADMIN — Medication 50 GRAM(S): at 08:18

## 2017-09-27 RX ADMIN — Medication 100 MILLIGRAM(S): at 17:39

## 2017-09-27 RX ADMIN — Medication 250 MILLIGRAM(S): at 17:39

## 2017-09-27 NOTE — BRIEF OPERATIVE NOTE - OPERATION/FINDINGS
Duodenal diverticulum  Multiple choledocholithiasis s/p biliary sphincterotomy and balloon sweep removal of stone

## 2017-09-27 NOTE — PROGRESS NOTE ADULT - PROBLEM SELECTOR PLAN 1
ERCP done today, waiting for pahology result  -pain control as needed  -Cardiac monitor and O2 sat after surgery  -Continue aspirin when GI recomends  -Invanz- 1g daily with florastor for leucocytosis which is likely reactive. no fevers noted ERCP done today, waiting for pathology result  -pain control as needed  -Cardiac monitor and O2 sat after surgery  -Continue aspirin when GI rec  -Invanz- 1g daily with florastor for leucocytosis which is likely reactive. no fevers noted (day #3)

## 2017-09-27 NOTE — PROGRESS NOTE ADULT - PROBLEM SELECTOR PLAN 5
Patient previous potassium, 2.9 was replaced: patient was given 10meq/100ml per 1hours x3 doses. Stop after 3rd dose  -Today's k is 4.8  - patient current mg is 1.7. patient was given 2mg IV  f/up electrolytes this evening to maximize patient prior to planned egd in am potassium repleted   - hypomagnesemia repleted and f/up labs in am   f/up electrolytes this evening to maximize patient prior to planned egd in am

## 2017-09-27 NOTE — PROGRESS NOTE ADULT - PROBLEM SELECTOR PLAN 4
-Moderate right effusion with right lower lobe atelectasis noted on CT. Consult is placed for CTS- patient is currently in non-distress. denies any chest pain nor shortness of breathe.  -no intervention likely -Moderate right effusion with right lower lobe atelectasis noted on CT. Consult is placed for CTS- patient is currently in non-distress.   -no intervention likely

## 2017-09-27 NOTE — PROGRESS NOTE ADULT - PROBLEM SELECTOR PLAN 7
- continue home medication  - Sertaline 125mcg once daily - continue home medication  -d/c sertraline

## 2017-09-27 NOTE — PROGRESS NOTE ADULT - ASSESSMENT
89 year old woman past medical history of , Afib(not on anticoagulation)  gout, depression, CVA hyperlipidemia, HTN, Parkinson, hypothyroidism presented to the ED with her daughter of complaints of yellowing of the skin. As per patient states this is the first time this has occurred. In the past the patient has had a bout of gallstone pancreatitis, during that admission the option of cholecystectomy was given and the patient refused it. She states the jaundice has been present for 5 days now. Did admit to having abdominal pain in the past couple of days, but currently does not have abdominal pain. Denies any recent travel nor sick contacts. Denies any pruritis  fever, nausea, vomiting, chest pain nor shortness of breathe, dysuria . Jaundice 2/2 to Choledocholithiasis with an extrahepatic CBD obstructive stone  09/27/2017: GI on board, appreciated. they recommended ERCP, which was done after cardiology clearance. Still pending pathology report 89 year old woman past medical history of , Afib(not on anticoagulation)  gout, depression, CVA hyperlipidemia, HTN, Parkinson, hypothyroidism presented to the ED with her daughter of complaints of yellowing of the skin. As per patient states this is the first time this has occurred. In the past the patient has had a bout of gallstone pancreatitis, during that admission the option of cholecystectomy was given and the patient refused it. She states the jaundice has been present for 5 days now. Did admit to having abdominal pain in the past couple of days, but currently does not have abdominal pain. Denies any recent travel nor sick contacts. Denies any pruritis  fever, nausea, vomiting, chest pain nor shortness of breathe, dysuria. Jaundice 2/2 to Choledocholithiasis with an extrahepatic CBD obstructive stone    09/27/2017: GI on board, appreciated. they recommended ERCP, which was done after cardiology clearance. Still pending pathology report

## 2017-09-27 NOTE — BRIEF OPERATIVE NOTE - POST-OP DX
Calculus of bile duct without cholangitis with obstruction  09/27/2017    Active  Hilario Mccain  Calculus of gallbladder and bile duct with obstruction without cholecystitis  09/27/2017    Active  Hilario Mccain

## 2017-09-27 NOTE — BRIEF OPERATIVE NOTE - PRE-OP DX
Calculus of bile duct without cholangitis with obstruction  09/27/2017    Active  Hilario Mccain  Cholelithiasis  09/27/2017    Active  Hilario Mccain

## 2017-09-27 NOTE — PROGRESS NOTE ADULT - PROBLEM SELECTOR PLAN 6
On ct noted patient has Left renal upper lobe pole calculus with a cortical atrophy- Currently patient denies any urinary symptoms

## 2017-09-27 NOTE — PROGRESS NOTE ADULT - PROBLEM SELECTOR PLAN 2
EKG-  A-fib. Cardio consult has been placed. Patient has pacemaker in place.  Patient- not on anticoagulation due to prior history of of bleeding.  CHADsVAsc- 6.- points were given for Age, sex, history of HTN and CVA  has-bled- 4- points were given for HTN, age, prior history of bleeding and stroke. patient is high risk of bleeding  -d/c echo- had one 4 months ago   -Cardiac cleared for ERCP.  -Cardiac monitor and O2 sat after ERCP

## 2017-09-27 NOTE — PROGRESS NOTE ADULT - SUBJECTIVE AND OBJECTIVE BOX
Patient is a 89y old  Female who presents with a chief complaint of Jaundice (25 Sep 2017 20:47)  Patient seen and examined at bedside. No acute distress and no acute overnight events. She states that she doesn't feel well, her abdomen still bothers her. She is hungry/thirsty. No pain at this time     ROS: No chest pain, palpitations, sob, light headedness/dizziness, difficulty breathing/cough, fevers/chills,n/v, diarrhea/constipation, dysuria or increased urinary frequency.,    Vital Signs Last 24 Hrs  T(C): 36.3 (27 Sep 2017 08:00), Max: 36.7 (27 Sep 2017 00:52)  T(F): 97.3 (27 Sep 2017 08:00), Max: 98.1 (27 Sep 2017 00:52)  HR: 112 (27 Sep 2017 08:00) (66 - 112)  BP: 142/81 (27 Sep 2017 08:00) (126/66 - 153/100)  RR: 18 (27 Sep 2017 08:00) (18 - 20)  SpO2: 97% (27 Sep 2017 08:00) (90% - 97%)          Physical exam:     General: Elderly lady laying bed side, in non acute distress. Resting tremor noted  Neurology: A&Ox3, nonfocal, CHILEL x 4  HEENT: sclera midldy icteric  ENT:  Mucosa moist, no ulcerations-  Neck:  Supple, no sinuses or palpable masses  Abd: soft, mildly distended, NT and no guarding. b/l all quad with BS +  Lymphatic:  No palpable cervical, supraclavicular, axillary or inguinal adenopathy  Respiratory: crackles noted bi-laterally base of the lungs.   Skin: jaundice noted. Stage 1 on sacral, eccymosis on left arm                          10.9   13.4  )-----------( 405      ( 27 Sep 2017 06:50 )             32.9     -27    146<H>  |  111<H>  |  22.0<H>  ----------------------------<  154<H>  4.8   |  24.0  |  0.63    Ca    8.6      27 Sep 2017 06:50  Phos  2.4       Mg     1.7         TPro  6.5<L>  /  Alb  3.1<L>  /  TBili  3.4<H>  /  DBili  x   /  AST  23  /  ALT  18  /  AlkPhos  270<H>          LIVER FUNCTIONS - ( 25 Sep 2017 15:17 )  Alb: 3.7 g/dL / Pro: 7.3 g/dL / ALK PHOS: 310 U/L / ALT: 21 U/L / AST: 31 U/L / GGT: x           PT/INR - ( 25 Sep 2017 15:17 )   PT: 14.4 sec;   INR: 1.30 ratio         PTT - ( 25 Sep 2017 15:17 )  PTT:33.6 sec      Urinalysis Basic - ( 25 Sep 2017 17:00 )    Color: Yellow / Appearance: Clear / S.005 / pH: x  Gluc: x / Ketone: Negative  / Bili: Negative / Urobili: 4 mg/dL   Blood: x / Protein: Negative mg/dL / Nitrite: Positive   Leuk Esterase: Trace / RBC: 0-2 /HPF / WBC 3-5   Sq Epi: x / Non Sq Epi: x / Bacteria: Few      < from: CT Abdomen and Pelvis w/ Oral Cont and w/ IV Cont (17 @ 17:58) >    IMPRESSION:   Extrahepatic biliary duct obstruction caused by distal common bile duct   stonemeasuring 2-3 mm.  Moderate right effusion with right lower lobe atelectasis. A  Left renal upper lobe pole calculus with a cortical atrophy   nonobstructing as described.   Chronic T11 vertebral fracture.    < end of copied text >        MEDICATIONS  (STANDING):  dextrose 5% + sodium chloride 0.9%. 1000 milliLiter(s) (75 mL/Hr) IV Continuous <Continuous>  ertapenem  IVPB      ertapenem  IVPB 1000 milliGRAM(s) IV Intermittent every 24 hours  levothyroxine 125 MICROGram(s) Oral daily  artificial  tears Solution 1 Drop(s) Both EYES two times a day  sertraline 50 milliGRAM(s) Oral daily  pantoprazole    Tablet 40 milliGRAM(s) Oral before breakfast  allopurinol 100 milliGRAM(s) Oral daily  saccharomyces boulardii 250 milliGRAM(s) Oral two times a day  metoprolol 100 milliGRAM(s) Oral daily    MEDICATIONS  (PRN): Patient is a 89y old  Female who presents with a chief complaint of Jaundice (25 Sep 2017 20:47)    Patient seen and examined at bedside. No acute distress and no acute overnight events. She states that she doesn't feel well, her abdomen still bothers her. She is hungry/thirsty. No pain at this time.    ROS: No chest pain, palpitations, sob, light headedness/dizziness, difficulty breathing/cough, fevers/chills,n/v, diarrhea/constipation, dysuria or increased urinary frequency.,    Vital Signs Last 24 Hrs  T(C): 36.3 (27 Sep 2017 08:00), Max: 36.7 (27 Sep 2017 00:52)  T(F): 97.3 (27 Sep 2017 08:00), Max: 98.1 (27 Sep 2017 00:52)  HR: 112 (27 Sep 2017 08:00) (66 - 112)  BP: 142/81 (27 Sep 2017 08:00) (126/66 - 153/100)  RR: 18 (27 Sep 2017 08:00) (18 - 20)  SpO2: 97% (27 Sep 2017 08:00) (90% - 97%)    Physical exam:     General: Elderly lady laying bed side, in non acute distress. Resting tremor noted  Neurology: A&Ox3, nonfocal, CHILEL x 4  HEENT: sclera midldy icteric  ENT:  Mucosa moist, no ulcerations-  Neck:  Supple, no sinuses or palpable masses  Abd: soft, mildly distended, NT and no guarding. b/l all quad with BS +  Lymphatic:  No palpable cervical, supraclavicular, axillary or inguinal adenopathy  Respiratory: crackles noted bi-laterally base of the lungs.   Skin: jaundice noted. Stage 1 on sacral, eccymosis on left arm                          10.9   13.4  )-----------( 405      ( 27 Sep 2017 06:50 )             32.9     09-27    146<H>  |  111<H>  |  22.0<H>  ----------------------------<  154<H>  4.8   |  24.0  |  0.63    Ca    8.6      27 Sep 2017 06:50  Phos  2.4       Mg     1.7         TPro  6.5<L>  /  Alb  3.1<L>  /  TBili  3.4<H>  /  DBili  x   /  AST  23  /  ALT  18  /  AlkPhos  270<H>          LIVER FUNCTIONS - ( 25 Sep 2017 15:17 )  Alb: 3.7 g/dL / Pro: 7.3 g/dL / ALK PHOS: 310 U/L / ALT: 21 U/L / AST: 31 U/L / GGT: x           PT/INR - ( 25 Sep 2017 15:17 )   PT: 14.4 sec;   INR: 1.30 ratio         PTT - ( 25 Sep 2017 15:17 )  PTT:33.6 sec      Urinalysis Basic - ( 25 Sep 2017 17:00 )    Color: Yellow / Appearance: Clear / S.005 / pH: x  Gluc: x / Ketone: Negative  / Bili: Negative / Urobili: 4 mg/dL   Blood: x / Protein: Negative mg/dL / Nitrite: Positive   Leuk Esterase: Trace / RBC: 0-2 /HPF / WBC 3-5   Sq Epi: x / Non Sq Epi: x / Bacteria: Few      < from: CT Abdomen and Pelvis w/ Oral Cont and w/ IV Cont (17 @ 17:58) >    IMPRESSION:   Extrahepatic biliary duct obstruction caused by distal common bile duct   stonemeasuring 2-3 mm.  Moderate right effusion with right lower lobe atelectasis. A  Left renal upper lobe pole calculus with a cortical atrophy   nonobstructing as described.   Chronic T11 vertebral fracture.    < end of copied text >        MEDICATIONS  (STANDING):  dextrose 5% + sodium chloride 0.9%. 1000 milliLiter(s) (75 mL/Hr) IV Continuous <Continuous>  ertapenem  IVPB      ertapenem  IVPB 1000 milliGRAM(s) IV Intermittent every 24 hours  levothyroxine 125 MICROGram(s) Oral daily  artificial  tears Solution 1 Drop(s) Both EYES two times a day  sertraline 50 milliGRAM(s) Oral daily  pantoprazole    Tablet 40 milliGRAM(s) Oral before breakfast  allopurinol 100 milliGRAM(s) Oral daily  saccharomyces boulardii 250 milliGRAM(s) Oral two times a day  metoprolol 100 milliGRAM(s) Oral daily    MEDICATIONS  (PRN):

## 2017-09-27 NOTE — PROGRESS NOTE ADULT - PROBLEM SELECTOR PLAN 3
current WBC is 13.4,  The current wbc could be 2/2 stress reaction.  Will monitor and patient is already on invanz  -Shen F/U current WBC is 13.4,  The current wbc could be 2/2 stress reaction.  Will monitor and patient is already on invanz  -Will F/U

## 2017-09-27 NOTE — BRIEF OPERATIVE NOTE - PROCEDURE
<<-----Click on this checkbox to enter Procedure ERCP with calculus removal from biliary duct  09/27/2017    Active  [id number]  ERCP with sphincterotomy  09/27/2017    Active  [id number]

## 2017-09-27 NOTE — PROGRESS NOTE ADULT - ATTENDING COMMENTS
Note addended where needed. Plan discussed with patients daughter at bedside. spoke to GI regarding s/p ERCP - stones removed, suggest cholecystectomy if patient would like it. d/w daughter, will await to speak to patient as she is a little lethargic after procedure   DNR/DNI

## 2017-09-28 ENCOUNTER — TRANSCRIPTION ENCOUNTER (OUTPATIENT)
Age: 82
End: 2017-09-28

## 2017-09-28 DIAGNOSIS — K80.51 CALCULUS OF BILE DUCT WITHOUT CHOLANGITIS OR CHOLECYSTITIS WITH OBSTRUCTION: ICD-10-CM

## 2017-09-28 LAB
ALBUMIN SERPL ELPH-MCNC: 3 G/DL — LOW (ref 3.3–5.2)
ALP SERPL-CCNC: 266 U/L — HIGH (ref 40–120)
ALT FLD-CCNC: 16 U/L — SIGNIFICANT CHANGE UP
ANION GAP SERPL CALC-SCNC: 14 MMOL/L — SIGNIFICANT CHANGE UP (ref 5–17)
AST SERPL-CCNC: 21 U/L — SIGNIFICANT CHANGE UP
BASOPHILS # BLD AUTO: 0.1 K/UL — SIGNIFICANT CHANGE UP (ref 0–0.2)
BASOPHILS NFR BLD AUTO: 0.3 % — SIGNIFICANT CHANGE UP (ref 0–2)
BILIRUB SERPL-MCNC: 2.8 MG/DL — HIGH (ref 0.4–2)
BUN SERPL-MCNC: 28 MG/DL — HIGH (ref 8–20)
CALCIUM SERPL-MCNC: 8.8 MG/DL — SIGNIFICANT CHANGE UP (ref 8.6–10.2)
CHLORIDE SERPL-SCNC: 110 MMOL/L — HIGH (ref 98–107)
CO2 SERPL-SCNC: 22 MMOL/L — SIGNIFICANT CHANGE UP (ref 22–29)
CREAT SERPL-MCNC: 0.98 MG/DL — SIGNIFICANT CHANGE UP (ref 0.5–1.3)
EOSINOPHIL # BLD AUTO: 0.1 K/UL — SIGNIFICANT CHANGE UP (ref 0–0.5)
EOSINOPHIL NFR BLD AUTO: 0.4 % — SIGNIFICANT CHANGE UP (ref 0–6)
GLUCOSE SERPL-MCNC: 164 MG/DL — HIGH (ref 70–115)
HCT VFR BLD CALC: 36.2 % — LOW (ref 37–47)
HGB BLD-MCNC: 11.4 G/DL — LOW (ref 12–16)
LYMPHOCYTES # BLD AUTO: 1.4 K/UL — SIGNIFICANT CHANGE UP (ref 1–4.8)
LYMPHOCYTES # BLD AUTO: 5.7 % — LOW (ref 20–55)
MCHC RBC-ENTMCNC: 31.5 G/DL — LOW (ref 32–36)
MCHC RBC-ENTMCNC: 32.6 PG — HIGH (ref 27–31)
MCV RBC AUTO: 103.4 FL — HIGH (ref 81–99)
MONOCYTES # BLD AUTO: 2.7 K/UL — HIGH (ref 0–0.8)
MONOCYTES NFR BLD AUTO: 11.3 % — HIGH (ref 3–10)
NEUTROPHILS # BLD AUTO: 19.4 K/UL — HIGH (ref 1.8–8)
NEUTROPHILS NFR BLD AUTO: 80.8 % — HIGH (ref 37–73)
PLATELET # BLD AUTO: 425 K/UL — HIGH (ref 150–400)
POTASSIUM SERPL-MCNC: 4.5 MMOL/L — SIGNIFICANT CHANGE UP (ref 3.5–5.3)
POTASSIUM SERPL-SCNC: 4.5 MMOL/L — SIGNIFICANT CHANGE UP (ref 3.5–5.3)
PROT SERPL-MCNC: 6.9 G/DL — SIGNIFICANT CHANGE UP (ref 6.6–8.7)
RBC # BLD: 3.5 M/UL — LOW (ref 4.4–5.2)
RBC # FLD: 17.6 % — HIGH (ref 11–15.6)
SODIUM SERPL-SCNC: 146 MMOL/L — HIGH (ref 135–145)
WBC # BLD: 23.9 K/UL — HIGH (ref 4.8–10.8)
WBC # FLD AUTO: 23.9 K/UL — HIGH (ref 4.8–10.8)

## 2017-09-28 PROCEDURE — 71010: CPT | Mod: 26

## 2017-09-28 PROCEDURE — 99233 SBSQ HOSP IP/OBS HIGH 50: CPT | Mod: GC

## 2017-09-28 PROCEDURE — 99232 SBSQ HOSP IP/OBS MODERATE 35: CPT

## 2017-09-28 RX ORDER — ROBINUL 0.2 MG/ML
0.2 INJECTION INTRAMUSCULAR; INTRAVENOUS EVERY 8 HOURS
Qty: 0 | Refills: 0 | Status: COMPLETED | OUTPATIENT
Start: 2017-09-28 | End: 2017-09-29

## 2017-09-28 RX ORDER — MORPHINE SULFATE 50 MG/1
2 CAPSULE, EXTENDED RELEASE ORAL EVERY 6 HOURS
Qty: 0 | Refills: 0 | Status: DISCONTINUED | OUTPATIENT
Start: 2017-09-28 | End: 2017-09-28

## 2017-09-28 RX ORDER — IPRATROPIUM/ALBUTEROL SULFATE 18-103MCG
3 AEROSOL WITH ADAPTER (GRAM) INHALATION
Qty: 0 | Refills: 0 | Status: DISCONTINUED | OUTPATIENT
Start: 2017-09-28 | End: 2017-09-29

## 2017-09-28 RX ORDER — MORPHINE SULFATE 50 MG/1
1 CAPSULE, EXTENDED RELEASE ORAL EVERY 6 HOURS
Qty: 0 | Refills: 0 | Status: DISCONTINUED | OUTPATIENT
Start: 2017-09-28 | End: 2017-09-29

## 2017-09-28 RX ORDER — FUROSEMIDE 40 MG
20 TABLET ORAL ONCE
Qty: 0 | Refills: 0 | Status: COMPLETED | OUTPATIENT
Start: 2017-09-28 | End: 2017-09-28

## 2017-09-28 RX ORDER — HEPARIN SODIUM 5000 [USP'U]/ML
5000 INJECTION INTRAVENOUS; SUBCUTANEOUS EVERY 12 HOURS
Qty: 0 | Refills: 0 | Status: DISCONTINUED | OUTPATIENT
Start: 2017-09-28 | End: 2017-09-29

## 2017-09-28 RX ORDER — FUROSEMIDE 40 MG
20 TABLET ORAL DAILY
Qty: 0 | Refills: 0 | Status: DISCONTINUED | OUTPATIENT
Start: 2017-09-28 | End: 2017-09-30

## 2017-09-28 RX ORDER — MORPHINE SULFATE 50 MG/1
2 CAPSULE, EXTENDED RELEASE ORAL ONCE
Qty: 0 | Refills: 0 | Status: DISCONTINUED | OUTPATIENT
Start: 2017-09-28 | End: 2017-09-28

## 2017-09-28 RX ADMIN — Medication 50 MILLIGRAM(S): at 18:07

## 2017-09-28 RX ADMIN — ROBINUL 0.2 MILLIGRAM(S): 0.2 INJECTION INTRAMUSCULAR; INTRAVENOUS at 23:35

## 2017-09-28 RX ADMIN — Medication 3 MILLILITER(S): at 21:32

## 2017-09-28 RX ADMIN — Medication 125 MICROGRAM(S): at 05:42

## 2017-09-28 RX ADMIN — MORPHINE SULFATE 2 MILLIGRAM(S): 50 CAPSULE, EXTENDED RELEASE ORAL at 00:10

## 2017-09-28 RX ADMIN — Medication 50 MILLIGRAM(S): at 05:43

## 2017-09-28 RX ADMIN — MORPHINE SULFATE 1 MILLIGRAM(S): 50 CAPSULE, EXTENDED RELEASE ORAL at 21:11

## 2017-09-28 RX ADMIN — MORPHINE SULFATE 1 MILLIGRAM(S): 50 CAPSULE, EXTENDED RELEASE ORAL at 11:15

## 2017-09-28 RX ADMIN — MORPHINE SULFATE 1 MILLIGRAM(S): 50 CAPSULE, EXTENDED RELEASE ORAL at 10:59

## 2017-09-28 RX ADMIN — Medication 250 MILLIGRAM(S): at 18:06

## 2017-09-28 RX ADMIN — SODIUM CHLORIDE 75 MILLILITER(S): 9 INJECTION, SOLUTION INTRAVENOUS at 05:41

## 2017-09-28 RX ADMIN — ERTAPENEM SODIUM 120 MILLIGRAM(S): 1 INJECTION, POWDER, LYOPHILIZED, FOR SOLUTION INTRAMUSCULAR; INTRAVENOUS at 23:11

## 2017-09-28 RX ADMIN — HEPARIN SODIUM 5000 UNIT(S): 5000 INJECTION INTRAVENOUS; SUBCUTANEOUS at 18:04

## 2017-09-28 RX ADMIN — PANTOPRAZOLE SODIUM 40 MILLIGRAM(S): 20 TABLET, DELAYED RELEASE ORAL at 05:44

## 2017-09-28 RX ADMIN — MORPHINE SULFATE 2 MILLIGRAM(S): 50 CAPSULE, EXTENDED RELEASE ORAL at 23:26

## 2017-09-28 RX ADMIN — Medication 1 DROP(S): at 05:42

## 2017-09-28 RX ADMIN — Medication 20 MILLIGRAM(S): at 16:56

## 2017-09-28 RX ADMIN — Medication 3 MILLILITER(S): at 16:16

## 2017-09-28 RX ADMIN — Medication 1 DROP(S): at 18:06

## 2017-09-28 RX ADMIN — MORPHINE SULFATE 1 MILLIGRAM(S): 50 CAPSULE, EXTENDED RELEASE ORAL at 22:00

## 2017-09-28 RX ADMIN — Medication 250 MILLIGRAM(S): at 05:42

## 2017-09-28 RX ADMIN — Medication 100 MILLIGRAM(S): at 17:00

## 2017-09-28 RX ADMIN — Medication 20 MILLIGRAM(S): at 20:09

## 2017-09-28 NOTE — PROGRESS NOTE ADULT - PROBLEM SELECTOR PLAN 8
patient current BP is 133/69  Is on home medications:   - metoprolol XL- 100mg once daily. - THS 2.38,  - continue home medication  - levothyroxine 125 mg  once daily, adjusted

## 2017-09-28 NOTE — PROGRESS NOTE ADULT - SUBJECTIVE AND OBJECTIVE BOX
Pt seen and examined f/u CBD stone and gallstones  This morning she denies any upper abdominal pain but does note some occasional vague lower abdominal pain. Tolerating clear liquids without nausea or vomiting. She is s/p ERCP with removal of CBD stones. Repeat LFTs pending.    REVIEW OF SYSTEMS:    CONSTITUTIONAL: No fever, weight loss, or fatigue  EYES: No eye pain, visual disturbances, or discharge  ENMT:  No difficulty hearing, tinnitus, vertigo; No sinus or throat pain  RESPIRATORY: No cough, wheezing, chills or hemoptysis; No shortness of breath  CARDIOVASCULAR: No chest pain, palpitations, dizziness, or leg swelling  GASTROINTESTINAL: as above    MEDICATIONS:  MEDICATIONS  (STANDING):  dextrose 5% + sodium chloride 0.9%. 1000 milliLiter(s) (75 mL/Hr) IV Continuous <Continuous>  ertapenem  IVPB      ertapenem  IVPB 1000 milliGRAM(s) IV Intermittent every 24 hours  levothyroxine 125 MICROGram(s) Oral daily  artificial  tears Solution 1 Drop(s) Both EYES two times a day  pantoprazole    Tablet 40 milliGRAM(s) Oral before breakfast  allopurinol 100 milliGRAM(s) Oral daily  saccharomyces boulardii 250 milliGRAM(s) Oral two times a day  metoprolol 50 milliGRAM(s) Oral two times a day  indomethacin Suppository 50 milliGRAM(s) Rectal once    MEDICATIONS  (PRN):      Allergies    penicillins (Unknown)    Intolerances        Vital Signs Last 24 Hrs  T(C): 36.6 (28 Sep 2017 00:45), Max: 36.6 (28 Sep 2017 00:45)  T(F): 97.8 (28 Sep 2017 00:45), Max: 97.8 (28 Sep 2017 00:45)  HR: 110 (28 Sep 2017 05:40) (110 - 118)  BP: 138/90 (28 Sep 2017 05:40) (133/69 - 142/81)  BP(mean): --  RR: 18 (28 Sep 2017 00:45) (18 - 18)  SpO2: 91% (28 Sep 2017 00:45) (91% - 97%)      PHYSICAL EXAM:    General: elderly white female in no acute distress but slightly tachypneic  HEENT: MMM, conjunctiva and sclera clear  Gastrointestinal:Abdomen: Soft non-tender non-distended; Normal bowel sounds; No hepatosplenomegaly  Extremities: no cyanosis, clubbing or edema.  Skin: Warm and dry. No obvious rash    LABS:      CBC Full  -  ( 27 Sep 2017 06:50 )  WBC Count : 13.4 K/uL  Hemoglobin : 10.9 g/dL  Hematocrit : 32.9 %  Platelet Count - Automated : 405 K/uL  Mean Cell Volume : 98.2 fl  Mean Cell Hemoglobin : 32.5 pg  Mean Cell Hemoglobin Concentration : 33.1 g/dL  Auto Neutrophil # : x  Auto Lymphocyte # : x  Auto Monocyte # : x  Auto Eosinophil # : x  Auto Basophil # : x  Auto Neutrophil % : 73.0 %  Auto Lymphocyte % : 13.0 %  Auto Monocyte % : 11.0 %  Auto Eosinophil % : 2.0 %  Auto Basophil % : x    09-27    146<H>  |  111<H>  |  22.0<H>  ----------------------------<  154<H>  4.8   |  24.0  |  0.63    Ca    8.6      27 Sep 2017 06:50  Phos  2.4     09-27  Mg     1.7     09-27    TPro  6.5<L>  /  Alb  3.1<L>  /  TBili  3.4<H>  /  DBili  x   /  AST  23  /  ALT  18  /  AlkPhos  270<H>  09-27

## 2017-09-28 NOTE — PROGRESS NOTE ADULT - PROBLEM SELECTOR PLAN 1
ERCP done today, waiting for pathology result  -pain control as needed  -Cardiac monitor and O2 sat after surgery  -Continue aspirin when GI reccommends  -Invanz- 1g daily with florastor for leucocytosis which is likely reactive. no fevers noted (day #4) ERCP done today, waiting for pathology result. with leucocytosis increasing. likely reactive post procedure - pain scale not increased   -morphine prn   -blood cx negative to date   -pain control as needed  -Cardiac monitor and O2 sat after surgery  -Continue aspirin when GI reccommends  -Invanz- 1g daily with florastor for leucocytosis which is likely reactive. no fevers noted (day #4) ERCP done today, waiting for pathology result. with leucocytosis increasing. likely reactive post procedure - pain scale not increased   -morphine prn   -blood cx negative to date   -pain control as needed  -Cardiac monitor and O2 sat after surgery  -Continue aspirin when GI reccommends  -Invanz- 1g daily with florastor for ERCP prophylaxys. no fevers noted (day #4)

## 2017-09-28 NOTE — PROGRESS NOTE ADULT - PROBLEM SELECTOR PLAN 7
- continue home medication  -d/c sertraline patient current BP is 133/69  Is on home medications:   - metoprolol XL- 100mg once daily.

## 2017-09-28 NOTE — PROGRESS NOTE ADULT - ASSESSMENT
89 year old woman past medical history of , Afib(not on anticoagulation)  gout, depression, CVA hyperlipidemia, HTN, Parkinson, hypothyroidism presented to the ED with her daughter of complaints of yellowing of the skin. As per patient states this is the first time this has occurred. In the past the patient has had a bout of gallstone pancreatitis, during that admission the option of cholecystectomy was given and the patient refused it. She states the jaundice has been present for 5 days now. Did admit to having abdominal pain in the past couple of days, but currently does not have abdominal pain. Denies any recent travel nor sick contacts. Denies any pruritis  fever, nausea, vomiting, chest pain nor shortness of breathe, dysuria. Jaundice 2/2 to Choledocholithiasis with an extrahepatic CBD obstructive stone    09/28/2017: GI did ERCP yesterday after cardiology clearance. Still pending pathology report. Pt is shot of breath today, is lready on O2 canula and on cardiac monitor. 89 year old woman past medical history of , Afib(not on anticoagulation)  gout, depression, CVA hyperlipidemia, HTN, Parkinson, hypothyroidism presented to the ED with her daughter of complaints of yellowing of the skin. As per patient states this is the first time this has occurred. In the past the patient has had a bout of gallstone pancreatitis, during that admission the option of cholecystectomy was given and the patient refused it. She states the jaundice has been present for 5 days now. Did admit to having abdominal pain in the past couple of days, but currently does not have abdominal pain. Denies any recent travel nor sick contacts. Denies any pruritis  fever, nausea, vomiting, chest pain nor shortness of breathe, dysuria. Jaundice 2/2 to Choledocholithiasis with an extrahepatic CBD obstructive stone    09/28/2017: GI did ERCP yesterday after cardiology clearance. Still pending pathology report. Pt is shot of breath today, is lready on O2 canula and on cardiac monitor, which shows A-fib, with some PVCs, ocasional o2 desaturations. Chest X-ray was done: showed CHF signs. Pt placed on o2 already, IV fluids D/C ed, bed head rised 89 year old woman past medical history of , Afib(not on anticoagulation)  gout, depression, CVA hyperlipidemia, HTN, Parkinson, hypothyroidism presented to the ED with her daughter of complaints of yellowing of the skin. As per patient states this is the first time this has occurred. In the past the patient has had a bout of gallstone pancreatitis, during that admission the option of cholecystectomy was given and the patient refused it. She states the jaundice has been present for 5 days now. Did admit to having abdominal pain in the past couple of days, but currently does not have abdominal pain. Denies any recent travel nor sick contacts. Denies any pruritis  fever, nausea, vomiting, chest pain nor shortness of breathe, dysuria. Jaundice 2/2 to Choledocholithiasis with an extrahepatic CBD obstructive stone    09/28/2017: GI did ERCP yesterday after cardiology clearance. Still pending pathology report. Pt is short of breath today, is lready on O2 canula, 4L and on cardiac monitor, which shows A-fib, with some PVCs, occasional o2 desaturations. Chest X-ray was done: showed CHF signs. Pt placed on o2 already, IV fluids D/C ed, bed head raised

## 2017-09-28 NOTE — CHART NOTE - NSCHARTNOTEFT_GEN_A_CORE
PgY-3 note    Called to patients bedside by nurse.  Patient with increase work of breathing and anxiety.  Examined at bedside with and chart reviewed.  Will add 2mg IVP morphine x1 dose now for increase work of breathing and anxiety.  Patient noted to have oral secretions.  Will give gentle oral suction and add Robinul.  Patients grad-daughter at bedside and she was updated on the patients status.      Leonard WALTERS

## 2017-09-28 NOTE — PROGRESS NOTE ADULT - SUBJECTIVE AND OBJECTIVE BOX
Patient is a 89y old  Female who presents with a chief complaint of Jaundice (25 Sep 2017 20:47)    Patient seen and examined at bedside. No acute distress and no acute overnight events. She states that her abdomen still bothers her intermittently, but she is now short of breath. She is hungry/thirsty. No pain at this time.    ROS: No chest pain, palpitations, sob, light headedness/dizziness, difficulty breathing/cough, fevers/chills,n/v, diarrhea/constipation, dysuria or increased urinary frequency.,    Vital Signs Last 24 Hrs  T(C): 36.4 (28 Sep 2017 07:55), Max: 36.6 (28 Sep 2017 00:45)  T(F): 97.6 (28 Sep 2017 07:55), Max: 97.8 (28 Sep 2017 00:45)  HR: 101 (28 Sep 2017 07:55) (101 - 118)  BP: 143/90 (28 Sep 2017 07:55) (133/69 - 143/90)  RR: 18 (28 Sep 2017 07:55) (18 - 18)  SpO2: 95% (28 Sep 2017 07:55) (91% - 95%)        Physical exam:     General: Elderly lady laying bed side, in non acute distress. Resting tremor noted  Neurology: A&Ox3, nonfocal, CHILEL x 4  HEENT: sclera mildly icteric, less than yesterday  ENT:  Mucosa moist, no ulcerations-  Neck:  Supple, no sinuses or palpable masses  Abd: soft, non distended, NT and no guarding.  b/l all quad with BS +  Lymphatic:  No palpable cervical, supraclavicular, axillary or inguinal adenopathy  Respiratory: crackles noted bi-laterally base of the lungs and wheezing on Left upper lobe   Skin: jaundice noted. Stage 1 on sacral, eccymosis on left arm                          10.9   13.4  )-----------( 405      ( 27 Sep 2017 06:50 )             32.9     09-27    146<H>  |  111<H>  |  22.0<H>  ----------------------------<  154<H>  4.8   |  24.0  |  0.63    Ca    8.6      27 Sep 2017 06:50  Phos  2.4     09-27  Mg     1.7         TPro  6.5<L>  /  Alb  3.1<L>  /  TBili  3.4<H>  /  DBili  x   /  AST  23  /  ALT  18  /  AlkPhos  270<H>          LIVER FUNCTIONS - ( 25 Sep 2017 15:17 )  Alb: 3.7 g/dL / Pro: 7.3 g/dL / ALK PHOS: 310 U/L / ALT: 21 U/L / AST: 31 U/L / GGT: x           PT/INR - ( 25 Sep 2017 15:17 )   PT: 14.4 sec;   INR: 1.30 ratio         PTT - ( 25 Sep 2017 15:17 )  PTT:33.6 sec      Urinalysis Basic - ( 25 Sep 2017 17:00 )    Color: Yellow / Appearance: Clear / S.005 / pH: x  Gluc: x / Ketone: Negative  / Bili: Negative / Urobili: 4 mg/dL   Blood: x / Protein: Negative mg/dL / Nitrite: Positive   Leuk Esterase: Trace / RBC: 0-2 /HPF / WBC 3-5   Sq Epi: x / Non Sq Epi: x / Bacteria: Few      < from: CT Abdomen and Pelvis w/ Oral Cont and w/ IV Cont (17 @ 17:58) >    IMPRESSION:   Extrahepatic biliary duct obstruction caused by distal common bile duct   stonemeasuring 2-3 mm.  Moderate right effusion with right lower lobe atelectasis. A  Left renal upper lobe pole calculus with a cortical atrophy   nonobstructing as described.   Chronic T11 vertebral fracture.    < end of copied text >        MEDICATIONS  (STANDING):  dextrose 5% + sodium chloride 0.9%. 1000 milliLiter(s) (75 mL/Hr) IV Continuous <Continuous>  ertapenem  IVPB      ertapenem  IVPB 1000 milliGRAM(s) IV Intermittent every 24 hours  levothyroxine 125 MICROGram(s) Oral daily  artificial  tears Solution 1 Drop(s) Both EYES two times a day  sertraline 50 milliGRAM(s) Oral daily  pantoprazole    Tablet 40 milliGRAM(s) Oral before breakfast  allopurinol 100 milliGRAM(s) Oral daily  saccharomyces boulardii 250 milliGRAM(s) Oral two times a day  metoprolol 100 milliGRAM(s) Oral daily    MEDICATIONS  (PRN): Patient is a 89y old  Female who presents with a chief complaint of Jaundice (25 Sep 2017 20:47)    Patient seen and examined at bedside. No acute distress and no acute overnight events. She states that her abdomen still bothers her intermittently, but she is now short of breath. She is hungry/thirsty. No pain at this time.    ROS: No chest pain, palpitations, sob, light headedness/dizziness, difficulty breathing/cough, fevers/chills,n/v, diarrhea/constipation, dysuria or increased urinary frequency.,    TELE: afib with some pvcs    Vital Signs Last 24 Hrs  T(C): 36.4 (28 Sep 2017 07:55), Max: 36.6 (28 Sep 2017 00:45)  T(F): 97.6 (28 Sep 2017 07:55), Max: 97.8 (28 Sep 2017 00:45)  HR: 101 (28 Sep 2017 07:55) (101 - 118)  BP: 143/90 (28 Sep 2017 07:55) (133/69 - 143/90)  RR: 18 (28 Sep 2017 07:55) (18 - 18)  SpO2: 95% (28 Sep 2017 07:55) (91% - 95%)    Physical exam:     General: Elderly lady laying bed side, in non acute distress. Resting tremor noted  Neurology: A&Ox3, nonfocal, CHILEL x 4  HEENT: sclera mildly icteric, less than yesterday  Abd: soft, non distended, NT and no guarding.  b/l all quad with BS +  Lymphatic:  No palpable cervical, supraclavicular, axillary or inguinal adenopathy  Respiratory: crackles noted bi-laterally base of the lungs and wheezing on Left upper lobe   Skin: jaundice noted. Stage 1 on sacral, eccymosis on left arm                          10.9   13.4  )-----------( 405      ( 27 Sep 2017 06:50 )             32.9     09-27    146<H>  |  111<H>  |  22.0<H>  ----------------------------<  154<H>  4.8   |  24.0  |  0.63    Ca    8.6      27 Sep 2017 06:50  Phos  2.4     -27  Mg     1.7         TPro  6.5<L>  /  Alb  3.1<L>  /  TBili  3.4<H>  /  DBili  x   /  AST  23  /  ALT  18  /  AlkPhos  270<H>          LIVER FUNCTIONS - ( 25 Sep 2017 15:17 )  Alb: 3.7 g/dL / Pro: 7.3 g/dL / ALK PHOS: 310 U/L / ALT: 21 U/L / AST: 31 U/L / GGT: x           PT/INR - ( 25 Sep 2017 15:17 )   PT: 14.4 sec;   INR: 1.30 ratio         PTT - ( 25 Sep 2017 15:17 )  PTT:33.6 sec      Urinalysis Basic - ( 25 Sep 2017 17:00 )    Color: Yellow / Appearance: Clear / S.005 / pH: x  Gluc: x / Ketone: Negative  / Bili: Negative / Urobili: 4 mg/dL   Blood: x / Protein: Negative mg/dL / Nitrite: Positive   Leuk Esterase: Trace / RBC: 0-2 /HPF / WBC 3-5   Sq Epi: x / Non Sq Epi: x / Bacteria: Few      < from: CT Abdomen and Pelvis w/ Oral Cont and w/ IV Cont (17 @ 17:58) >    IMPRESSION:   Extrahepatic biliary duct obstruction caused by distal common bile duct   stonemeasuring 2-3 mm.  Moderate right effusion with right lower lobe atelectasis. A  Left renal upper lobe pole calculus with a cortical atrophy   nonobstructing as described.   Chronic T11 vertebral fracture.    < end of copied text >    Blood cx negative to date       MEDICATIONS  (STANDING):  dextrose 5% + sodium chloride 0.9%. 1000 milliLiter(s) (75 mL/Hr) IV Continuous <Continuous>  ertapenem  IVPB      ertapenem  IVPB 1000 milliGRAM(s) IV Intermittent every 24 hours  levothyroxine 125 MICROGram(s) Oral daily  artificial  tears Solution 1 Drop(s) Both EYES two times a day  sertraline 50 milliGRAM(s) Oral daily  pantoprazole    Tablet 40 milliGRAM(s) Oral before breakfast  allopurinol 100 milliGRAM(s) Oral daily  saccharomyces boulardii 250 milliGRAM(s) Oral two times a day  metoprolol 100 milliGRAM(s) Oral daily    MEDICATIONS  (PRN): Patient is a 89y old  Female who presents with a chief complaint of Jaundice (25 Sep 2017 20:47)    Patient seen and examined at bedside. No acute distress and no acute overnight events. She states that her abdomen still bothers her intermittently, but she is now short of breath. She is hungry/thirsty. No pain at this time.    ROS: No chest pain, palpitations, sob, light headedness/dizziness, difficulty breathing/cough, fevers/chills,n/v, diarrhea/constipation, dysuria or increased urinary frequency.,    TELE: afib with some pvcs    Vital Signs Last 24 Hrs  T(C): 36.4 (28 Sep 2017 07:55), Max: 36.6 (28 Sep 2017 00:45)  T(F): 97.6 (28 Sep 2017 07:55), Max: 97.8 (28 Sep 2017 00:45)  HR: 101 (28 Sep 2017 07:55) (101 - 118)  BP: 143/90 (28 Sep 2017 07:55) (133/69 - 143/90)  RR: 18 (28 Sep 2017 07:55) (18 - 18)  SpO2: 95% (28 Sep 2017 07:55) (91% - 95%)    Physical exam:     General: Elderly lady laying bed side, in non acute distress. Resting tremor noted  Neurology: A&Ox3, nonfocal, CHILEL x 4  HEENT: sclera mildly icteric, less than yesterday  Abd: soft, non distended, NT and no guarding.  b/l all quad with BS +  Lymphatic:  No palpable cervical, supraclavicular, axillary or inguinal adenopathy  Respiratory: crackles noted bi-laterally base of the lungs and wheezing on Left upper lobe   Skin: jaundice noted. Stage 1 on sacral, eccymosis on left arm                          10.9   13.4  )-----------( 405      ( 27 Sep 2017 06:50 )             32.9     09-27    146<H>  |  111<H>  |  22.0<H>  ----------------------------<  154<H>  4.8   |  24.0  |  0.63    Ca    8.6      27 Sep 2017 06:50  Phos  2.4     -27  Mg     1.7         TPro  6.5<L>  /  Alb  3.1<L>  /  TBili  3.4<H>  /  DBili  x   /  AST  23  /  ALT  18  /  AlkPhos  270<H>          LIVER FUNCTIONS - ( 25 Sep 2017 15:17 )  Alb: 3.7 g/dL / Pro: 7.3 g/dL / ALK PHOS: 310 U/L / ALT: 21 U/L / AST: 31 U/L / GGT: x           PT/INR - ( 25 Sep 2017 15:17 )   PT: 14.4 sec;   INR: 1.30 ratio         PTT - ( 25 Sep 2017 15:17 )  PTT:33.6 sec      Urinalysis Basic - ( 25 Sep 2017 17:00 )    Color: Yellow / Appearance: Clear / S.005 / pH: x  Gluc: x / Ketone: Negative  / Bili: Negative / Urobili: 4 mg/dL   Blood: x / Protein: Negative mg/dL / Nitrite: Positive   Leuk Esterase: Trace / RBC: 0-2 /HPF / WBC 3-5   Sq Epi: x / Non Sq Epi: x / Bacteria: Few      < from: CT Abdomen and Pelvis w/ Oral Cont and w/ IV Cont (17 @ 17:58) >    IMPRESSION:   Extrahepatic biliary duct obstruction caused by distal common bile duct   stonemeasuring 2-3 mm.  Moderate right effusion with right lower lobe atelectasis. A  Left renal upper lobe pole calculus with a cortical atrophy   nonobstructing as described.   Chronic T11 vertebral fracture.    < end of copied text >    Blood cx negative to date         EXAM:  CHEST SINGLE VIEW FRONTAL                        PROCEDURE DATE:  2017    INTERPRETATION:  Portable chest radiograph      CLINICAL INFORMATION:   Short of breath.  TECHNIQUE:  Portable  AP view of the chest was obtained.  COMPARISON: 2017 available for review.  FINDINGS:   There is cardiomegaly, vascular congestion, perihilar interstitial   infiltrates and bilateral moderate pleural effusion. Constellation of   findings suggestive of CHF.  Cardiac device wire leads are within right atrium and right ventricle.   Trachea midline. No hilar mass.   Visualized osseous structures are intact.  IMPRESSION:   Constellation of findings suggestive of CHF..        OLEG ALEJANDRO M.D., ATTENDING RADIOLOGIST  This document has been electronically signed. Sep 28 2017 12:02PM            MEDICATIONS  (STANDING):  dextrose 5% + sodium chloride 0.9%. 1000 milliLiter(s) (75 mL/Hr) IV Continuous <Continuous>  ertapenem  IVPB      ertapenem  IVPB 1000 milliGRAM(s) IV Intermittent every 24 hours  levothyroxine 125 MICROGram(s) Oral daily  artificial  tears Solution 1 Drop(s) Both EYES two times a day  sertraline 50 milliGRAM(s) Oral daily  pantoprazole    Tablet 40 milliGRAM(s) Oral before breakfast  allopurinol 100 milliGRAM(s) Oral daily  saccharomyces boulardii 250 milliGRAM(s) Oral two times a day  metoprolol 100 milliGRAM(s) Oral daily    MEDICATIONS  (PRN):

## 2017-09-28 NOTE — PROGRESS NOTE ADULT - PROBLEM SELECTOR PLAN 6
On ct noted patient has Left renal upper lobe pole calculus with a cortical atrophy- Currently patient denies any urinary symptoms - continue home medication  -d/c sertraline - continue home medication  -d/saúl sertraline

## 2017-09-28 NOTE — PROGRESS NOTE ADULT - PROBLEM SELECTOR PLAN 4
-Moderate right effusion with right lower lobe atelectasis noted on CT. Consult is placed for CTS- patient is currently in non-distress.   -no intervention likely -Moderate right effusion with right lower lobe atelectasis noted on CT. Consult is   -no intervention likely  -Pt is today SOB, chest X-ray was done: showed CHF signs. Pt placed on o2 already, IV fluids D/C ed, bed head rised potassium repleted 2 days ago, today 4.5  - hypomagnesemia repleted and f/up labs in am   f/up electrolytes this evening to maximize patient prior to planned egd in am potassium repleted 2 days ago, today 4.5  - hypomagnesemia repleted and f/up labs in am

## 2017-09-28 NOTE — PROGRESS NOTE ADULT - PROBLEM SELECTOR PLAN 2
EKG-  A-fib. Cardio consult has been placed. Patient has pacemaker in place.  Patient- not on anticoagulation due to prior history of of bleeding.  CHADsVAsc- 6.- points were given for Age, sex, history of HTN and CVA  has-bled- 4- points were given for HTN, age, prior history of bleeding and stroke. patient is high risk of bleeding  -d/c echo- had one 4 months ago   -Cardiac cleared for ERCP.  -Cardiac monitor and O2 sat EKG-  A-fib. Cardio consult has been placed. Patient has pacemaker in place. with fluctuating rhythm and now e/o fluid overload. Echo done in may 2017 is 55-60%. -lasix 20mg IVP stat and daily based on cxr  Patient- not on anticoagulation due to prior history of of bleeding.  CHADsVAsc- 6.- points were given for Age, sex, history of HTN and CVA  has-bled- 4- points were given for HTN, age, prior history of bleeding and stroke. patient is high risk of bleeding  -d/c echo- had one 4 months ago

## 2017-09-28 NOTE — PROGRESS NOTE ADULT - ATTENDING COMMENTS
Note addended where needed. Plan discussed with patient. Spoke to daughter at bedside yesterday evening. DNR/DNI. Prognosis guarded

## 2017-09-28 NOTE — PROGRESS NOTE ADULT - PROBLEM SELECTOR PLAN 5
potassium repleted 2 days ago, today 4.5  - hypomagnesemia repleted and f/up labs in am   f/up electrolytes this evening to maximize patient prior to planned egd in am On ct noted patient has Left renal upper lobe pole calculus with a cortical atrophy- Currently patient denies any urinary symptoms

## 2017-09-28 NOTE — PROGRESS NOTE ADULT - PROBLEM SELECTOR PLAN 10
VCD boots -Today is 23.9, from 13.4 likely reactive after ERCP  -Pt already on ABX  -Will continue to monitor

## 2017-09-28 NOTE — CHART NOTE - NSCHARTNOTEFT_GEN_A_CORE
Nurse called reporting that pt was short of breath.  Pt reassessed, she continues with some generalized crackles and wheezing, is already on O2 4L by canula and continuous cardiac monitor and oxigen sat.  Duonebs added  Pt is already on heparin for DVT prophylaxis Nurse called reporting that pt was short of breath.  Pt reassessed, she continues with some generalized crackles and wheezing, is already on O2 4L by canula and continuous cardiac monitor and oxigen sat.  Tello added, furosemide 20mg given  Pt is already on heparin for DVT prophylaxis Nurse called reporting that pt was short of breath.  Pt reassessed, she continues with some generalized crackles and wheezing, is already on O2 4L by canula and continuous cardiac monitor and oxigen sat.  Tello added, furosemide 20mg given  Pt is already on heparin for DVT prophylaxis  Will continue to monitor

## 2017-09-28 NOTE — PROGRESS NOTE ADULT - PROBLEM SELECTOR PLAN 3
current WBC is 13.4,  The current wbc could be 2/2 stress reaction.  Will monitor and patient is already on invanz  -Will F/U -Moderate right effusion with right lower lobe atelectasis noted on CT. Consult is   -no intervention likely  xray noted as above with lasix to start with at a lower dose and d/c fluids -Moderate right effusion with right lower lobe atelectasis noted on CT.   -no intervention likely  xray noted as above with lasix to start with at a lower dose and d/c fluids

## 2017-09-28 NOTE — PROGRESS NOTE ADULT - PROBLEM SELECTOR PLAN 1
now s/p removal f CBD stones with no evidence of any sequelae and appears stable. Would suggest surgical consult for cxholecystectomy but would probably require improvement in pulmonary status. Will check LFTs. now s/p removal f CBD stones with no evidence of any sequelae and appears stable. Would suggest surgical consult for cxholecystectomy but would probably require improvement in pulmonary status. Will check LFTs and advance diet to soft, low faqt and low salt.

## 2017-09-29 DIAGNOSIS — F41.8 OTHER SPECIFIED ANXIETY DISORDERS: ICD-10-CM

## 2017-09-29 DIAGNOSIS — Z51.5 ENCOUNTER FOR PALLIATIVE CARE: ICD-10-CM

## 2017-09-29 DIAGNOSIS — J96.01 ACUTE RESPIRATORY FAILURE WITH HYPOXIA: ICD-10-CM

## 2017-09-29 LAB
ALBUMIN SERPL ELPH-MCNC: 3.2 G/DL — LOW (ref 3.3–5.2)
ALP SERPL-CCNC: 237 U/L — HIGH (ref 40–120)
ALT FLD-CCNC: 15 U/L — SIGNIFICANT CHANGE UP
ANION GAP SERPL CALC-SCNC: 14 MMOL/L — SIGNIFICANT CHANGE UP (ref 5–17)
AST SERPL-CCNC: 17 U/L — SIGNIFICANT CHANGE UP
BILIRUB DIRECT SERPL-MCNC: 1.4 MG/DL — HIGH (ref 0–0.3)
BILIRUB INDIRECT FLD-MCNC: 0.8 MG/DL — SIGNIFICANT CHANGE UP (ref 0.2–1)
BILIRUB SERPL-MCNC: 2.2 MG/DL — HIGH (ref 0.4–2)
BUN SERPL-MCNC: 37 MG/DL — HIGH (ref 8–20)
CALCIUM SERPL-MCNC: 9.2 MG/DL — SIGNIFICANT CHANGE UP (ref 8.6–10.2)
CHLORIDE SERPL-SCNC: 109 MMOL/L — HIGH (ref 98–107)
CO2 SERPL-SCNC: 24 MMOL/L — SIGNIFICANT CHANGE UP (ref 22–29)
CREAT SERPL-MCNC: 1.52 MG/DL — HIGH (ref 0.5–1.3)
GLUCOSE SERPL-MCNC: 137 MG/DL — HIGH (ref 70–115)
HCT VFR BLD CALC: 33.1 % — LOW (ref 37–47)
HGB BLD-MCNC: 10.4 G/DL — LOW (ref 12–16)
MAGNESIUM SERPL-MCNC: 2.3 MG/DL — SIGNIFICANT CHANGE UP (ref 1.6–2.6)
MCHC RBC-ENTMCNC: 31.4 G/DL — LOW (ref 32–36)
MCHC RBC-ENTMCNC: 33.1 PG — HIGH (ref 27–31)
MCV RBC AUTO: 105.4 FL — HIGH (ref 81–99)
PHOSPHATE SERPL-MCNC: 6.3 MG/DL — HIGH (ref 2.4–4.7)
PLATELET # BLD AUTO: 375 K/UL — SIGNIFICANT CHANGE UP (ref 150–400)
POTASSIUM SERPL-MCNC: 5.1 MMOL/L — SIGNIFICANT CHANGE UP (ref 3.5–5.3)
POTASSIUM SERPL-SCNC: 5.1 MMOL/L — SIGNIFICANT CHANGE UP (ref 3.5–5.3)
PROT SERPL-MCNC: 6.8 G/DL — SIGNIFICANT CHANGE UP (ref 6.6–8.7)
RBC # BLD: 3.14 M/UL — LOW (ref 4.4–5.2)
RBC # FLD: 18.2 % — HIGH (ref 11–15.6)
SODIUM SERPL-SCNC: 147 MMOL/L — HIGH (ref 135–145)
WBC # BLD: 23.3 K/UL — HIGH (ref 4.8–10.8)
WBC # FLD AUTO: 23.3 K/UL — HIGH (ref 4.8–10.8)

## 2017-09-29 PROCEDURE — 99232 SBSQ HOSP IP/OBS MODERATE 35: CPT

## 2017-09-29 PROCEDURE — 99223 1ST HOSP IP/OBS HIGH 75: CPT

## 2017-09-29 PROCEDURE — 99233 SBSQ HOSP IP/OBS HIGH 50: CPT | Mod: GC

## 2017-09-29 RX ORDER — ERTAPENEM SODIUM 1 G/1
500 INJECTION, POWDER, LYOPHILIZED, FOR SOLUTION INTRAMUSCULAR; INTRAVENOUS EVERY 24 HOURS
Qty: 0 | Refills: 0 | Status: DISCONTINUED | OUTPATIENT
Start: 2017-09-29 | End: 2017-09-30

## 2017-09-29 RX ORDER — IPRATROPIUM/ALBUTEROL SULFATE 18-103MCG
3 AEROSOL WITH ADAPTER (GRAM) INHALATION EVERY 4 HOURS
Qty: 0 | Refills: 0 | Status: DISCONTINUED | OUTPATIENT
Start: 2017-09-29 | End: 2017-09-30

## 2017-09-29 RX ORDER — MORPHINE SULFATE 50 MG/1
2 CAPSULE, EXTENDED RELEASE ORAL
Qty: 100 | Refills: 0 | Status: DISCONTINUED | OUTPATIENT
Start: 2017-09-29 | End: 2017-09-30

## 2017-09-29 RX ORDER — MORPHINE SULFATE 50 MG/1
2 CAPSULE, EXTENDED RELEASE ORAL EVERY 4 HOURS
Qty: 0 | Refills: 0 | Status: DISCONTINUED | OUTPATIENT
Start: 2017-09-29 | End: 2017-09-30

## 2017-09-29 RX ORDER — ERTAPENEM SODIUM 1 G/1
1000 INJECTION, POWDER, LYOPHILIZED, FOR SOLUTION INTRAMUSCULAR; INTRAVENOUS EVERY 24 HOURS
Qty: 0 | Refills: 0 | Status: DISCONTINUED | OUTPATIENT
Start: 2017-09-29 | End: 2017-09-29

## 2017-09-29 RX ORDER — MORPHINE SULFATE 50 MG/1
2 CAPSULE, EXTENDED RELEASE ORAL EVERY 4 HOURS
Qty: 0 | Refills: 0 | Status: DISCONTINUED | OUTPATIENT
Start: 2017-09-29 | End: 2017-09-29

## 2017-09-29 RX ORDER — ATROPINE SULFATE 1 %
2 DROPS OPHTHALMIC (EYE) EVERY 4 HOURS
Qty: 0 | Refills: 0 | Status: DISCONTINUED | OUTPATIENT
Start: 2017-09-29 | End: 2017-09-29

## 2017-09-29 RX ORDER — MORPHINE SULFATE 50 MG/1
2 CAPSULE, EXTENDED RELEASE ORAL ONCE
Qty: 0 | Refills: 0 | Status: DISCONTINUED | OUTPATIENT
Start: 2017-09-29 | End: 2017-09-29

## 2017-09-29 RX ADMIN — Medication 0.5 MILLIGRAM(S): at 11:01

## 2017-09-29 RX ADMIN — HEPARIN SODIUM 5000 UNIT(S): 5000 INJECTION INTRAVENOUS; SUBCUTANEOUS at 05:58

## 2017-09-29 RX ADMIN — Medication 20 MILLIGRAM(S): at 05:38

## 2017-09-29 RX ADMIN — MORPHINE SULFATE 1 MILLIGRAM(S): 50 CAPSULE, EXTENDED RELEASE ORAL at 10:10

## 2017-09-29 RX ADMIN — Medication 3 MILLILITER(S): at 15:24

## 2017-09-29 RX ADMIN — Medication 3 MILLILITER(S): at 10:46

## 2017-09-29 RX ADMIN — Medication 0.25 MILLIGRAM(S): at 23:21

## 2017-09-29 RX ADMIN — ROBINUL 0.2 MILLIGRAM(S): 0.2 INJECTION INTRAMUSCULAR; INTRAVENOUS at 14:44

## 2017-09-29 RX ADMIN — Medication 1 DROP(S): at 05:58

## 2017-09-29 RX ADMIN — MORPHINE SULFATE 2 MILLIGRAM(S): 50 CAPSULE, EXTENDED RELEASE ORAL at 14:44

## 2017-09-29 RX ADMIN — ROBINUL 0.2 MILLIGRAM(S): 0.2 INJECTION INTRAMUSCULAR; INTRAVENOUS at 05:38

## 2017-09-29 RX ADMIN — MORPHINE SULFATE 1 MILLIGRAM(S): 50 CAPSULE, EXTENDED RELEASE ORAL at 09:55

## 2017-09-29 RX ADMIN — MORPHINE SULFATE 2 MG/HR: 50 CAPSULE, EXTENDED RELEASE ORAL at 16:51

## 2017-09-29 RX ADMIN — ERTAPENEM SODIUM 100 MILLIGRAM(S): 1 INJECTION, POWDER, LYOPHILIZED, FOR SOLUTION INTRAMUSCULAR; INTRAVENOUS at 22:59

## 2017-09-29 RX ADMIN — MORPHINE SULFATE 2 MILLIGRAM(S): 50 CAPSULE, EXTENDED RELEASE ORAL at 15:00

## 2017-09-29 RX ADMIN — MORPHINE SULFATE 2 MILLIGRAM(S): 50 CAPSULE, EXTENDED RELEASE ORAL at 15:45

## 2017-09-29 RX ADMIN — Medication 0.25 MILLIGRAM(S): at 15:57

## 2017-09-29 RX ADMIN — MORPHINE SULFATE 2 MILLIGRAM(S): 50 CAPSULE, EXTENDED RELEASE ORAL at 15:54

## 2017-09-29 RX ADMIN — MORPHINE SULFATE 2 MG/HR: 50 CAPSULE, EXTENDED RELEASE ORAL at 17:31

## 2017-09-29 RX ADMIN — ROBINUL 0.2 MILLIGRAM(S): 0.2 INJECTION INTRAMUSCULAR; INTRAVENOUS at 21:33

## 2017-09-29 NOTE — PROGRESS NOTE ADULT - PROBLEM SELECTOR PLAN 7
patient current BP is 118/69  Is on home medications:   - metoprolol XL- 100mg once daily. patient current BP is 118/69  d/c'd metoprolol

## 2017-09-29 NOTE — PROGRESS NOTE ADULT - PROBLEM SELECTOR PLAN 4
potassium repleted 3 days ago, today 5.1  - hypomagnesemia repleted and f/up labs in am resolved and now no more hypokalemia   Hyperphosphatemia noted - likely 2/2 renal dysfunction  RENAL DYSFUNCTION with BHUMI likely 2/2 diuretic use from 9/28 vs declining overall functional status

## 2017-09-29 NOTE — CONSULT NOTE ADULT - PROBLEM SELECTOR RECOMMENDATION 6
Aggressive symptom management  Rapid deterioration, Respiratory Failure; patient is actively dying.  Planed to meet daughter who was coming in a couple of hours  Discuss Goals and making her comfortable

## 2017-09-29 NOTE — GOALS OF CARE CONVERSATION - PERSONAL ADVANCE DIRECTIVE - CONVERSATION DETAILS
Daughter and Partner at bedside, called Palliative Team to revisit patient to speak about Goals of Care.  Patient is rapidly deteriorating, on Non-rebreather mask, restless and tachypneic-needing aggressive symptom management.    CT of Brain was scheduled to R/O Stroke, she is in Respiratory Failure Tachypneic and restless.  After discussing her mother's rapid deterioration, WBC 23,000 not responding to Antibiotics-nonverbal.  She agreed to cancel CT scan and trip to Radiology, instead we will concentrate on keeping her comfortable.

## 2017-09-29 NOTE — PROGRESS NOTE ADULT - ASSESSMENT
89 year old woman past medical history of , Afib(not on anticoagulation)  gout, depression, CVA hyperlipidemia, HTN, Parkinson, hypothyroidism presented to the ED with her daughter of complaints of yellowing of the skin. As per patient states this is the first time this has occurred. In the past the patient has had a bout of gallstone pancreatitis, during that admission the option of cholecystectomy was given and the patient refused it. She states the jaundice has been present for 5 days now. Did admit to having abdominal pain in the past couple of days, but currently does not have abdominal pain. Denies any recent travel nor sick contacts. Denies any pruritis  fever, nausea, vomiting, chest pain nor shortness of breathe, dysuria. Jaundice 2/2 to Choledocholithiasis with an extrahepatic CBD obstructive stone.  GI did ERCP done on 09/27/2017 after cardiology clearance. Still pending pathology report. Pt complained of SOB the day after the procedure, she was placed on O2 canula, 4L and on cardiac monitor, which showed A-fib, with some PVCs, occasional o2 desaturations. Chest X-ray was done: showed CHF signs. Pt placed on o2 already, IV fluids D/C ed, bed head raised    09/29/2017: Patient seen and examined at bedside. Pt was in respiratory distress since the ERCP yesterday, with signs of fluid overload. She was placed in O2, increased to non-rebreather mask 14L  overnight due to poor response on nasal canula and another dose of furosemide was given (total 2x20mg). She continued in respiratory distress, but this morning her lungs sound with less crackles. On the monitor she was intermittently on A-fib/ sinus rhythm. Will continue to monitor and speak to the family. Palliative consult requested 89 year old woman past medical history of , Afib(not on anticoagulation)  gout, depression, CVA hyperlipidemia, HTN, Parkinson, hypothyroidism presented to the ED with her daughter of complaints of yellowing of the skin. As per patient states this is the first time this has occurred. In the past the patient has had a bout of gallstone pancreatitis, during that admission the option of cholecystectomy was given and the patient refused it. She states the jaundice has been present for 5 days now. Did admit to having abdominal pain in the past couple of days, but currently does not have abdominal pain. Denies any recent travel nor sick contacts. Denies any pruritis  fever, nausea, vomiting, chest pain nor shortness of breathe, dysuria. Admitted for Jaundice 2/2 to Choledocholithiasis with an extrahepatic CBD obstructive stone. During the hospital course on day #3, GI did ERCP done on 09/27/2017 after cardiology clearance. Pt complained of SOB the day after the procedure, she was placed on O2 canula, 4L and on cardiac monitor, which showed A-fib, with some PVCs, occasional o2 desaturations. Chest X-ray was done: showed CHF signs. Pt placed on o2 already, IV fluids D/C ed, bed head raised and diuresis given on 9/28 via lasix IVP x 2 and to continue. On the morning of 9/29, she seemed to have periods of waxing and waning and was more lethargic. [facility] care saw patient, spoke to family (by primary team also) and likely patient is actively passing as she has e/o leucocytosis/no fevers, elevated cr and overall decline in her status

## 2017-09-29 NOTE — PROGRESS NOTE ADULT - PROBLEM SELECTOR PLAN 3
-Moderate right effusion with right lower lobe atelectasis noted on CT.   -no intervention likely  -xray noted as above with lasix 40mg total given yesterday and d/saúl fluids EKG-  A-fib. Seen by cardio and cleared initially - no further intervention at this time  will c/w lasix for now, d/c monitor as it is not going to change our overall management at this time   -Patient has pacemaker in place   -CHADsVAsc- 6.; hasbled 4 - no further change to management

## 2017-09-29 NOTE — DISCHARGE NOTE ADULT - CARE PLAN
Principal Discharge DX:	Acute respiratory failure with hypoxemia  Secondary Diagnosis:	Afib  Secondary Diagnosis:	Common bile duct (CBD) obstruction  Secondary Diagnosis:	Electrolyte abnormality  Secondary Diagnosis:	Hypertension  Secondary Diagnosis:	Hypothyroidism  Secondary Diagnosis:	Pleural effusion

## 2017-09-29 NOTE — PROGRESS NOTE ADULT - PROBLEM SELECTOR PLAN 1
ERCP done today, waiting for pathology result. with leucocytosis, mildly trending down (23.3 from 23.9) likely reactive post procedure   She is on morphine prn for pain, pain control as needed  -blood cx negative to date   -On cardiac monitor and O2 sat after surgery. Will d/c cardiac monitor since no acute cardiac event were reported  -Continue aspirin when GI recommends  -Invanz- 1g daily with florastor for ERCP prophylaxis. no fevers noted (day #5) ERCP done today, with actively increasing leucocytosis and no further increase in her abdominal pain to indicate a post procedural complication   She is on morphine prn for pain, pain control as needed  -blood cx negative to date   -On cardiac monitor and O2 sat after surgery. Will d/c cardiac monitor given likely trend to hospice direction at this time   -hold asa   -Invanz- 1g daily with florastor for ERCP prophylaxis. no fevers noted (day #5)

## 2017-09-29 NOTE — PROGRESS NOTE ADULT - ATTENDING COMMENTS
Note addended where needed. Plan discussed with patients team. Both resident and pall team spoke to daughter at bedside. Everyone in agreement for hospice care. Seen by palliative care and medications adjusted as she does not take anything orally at this time. Hospice eval to be called. Unclear if patients declining status is eventual or immediate and for that reason, she should be seen by hospice in the event that there is an availability over the weekend for a bed to transition her to an inpatient hospice setting which is ultimately the best setting for the patient/family Note addended where needed. Plan discussed with patients team. Both resident and pall team spoke to daughter at bedside. Everyone in agreement for hospice care. Seen by palliative care and medications adjusted as she does not take anything orally at this time. Hospice eval to be called. Unclear if patients declining status is eventual or immediate and for that reason, she should be seen by hospice in the event that there is an availability over the weekend for a bed to transition her to an inpatient hospice setting which is ultimately the best setting for the patient/family    ADDENDUM: spoke to Dr Herron at [phone]: patient is hospice approved by doc to doc approval. Paperwork pending to come to us.

## 2017-09-29 NOTE — DISCHARGE NOTE ADULT - SECONDARY DIAGNOSIS.
Pleural effusion Afib Common bile duct (CBD) obstruction Electrolyte abnormality Hypertension Hypothyroidism

## 2017-09-29 NOTE — PROGRESS NOTE ADULT - PROBLEM SELECTOR PLAN 9
VCD boots  -Heparin given yesterday after ERCP for DVT prohylaxys VCD boots  -Heparin given yesterday after ERCP for DVT prophylaxis

## 2017-09-29 NOTE — PROGRESS NOTE ADULT - PROVIDER SPECIALTY LIST ADULT
Family Medicine
Family Medicine
Gastroenterology
Anesthesia
Gastroenterology
Family Medicine
Family Medicine

## 2017-09-29 NOTE — PROGRESS NOTE ADULT - PROBLEM SELECTOR PROBLEM 1
Common bile duct (CBD) obstruction
Calculus of bile duct without cholecystitis with obstruction
Common bile duct (CBD) obstruction

## 2017-09-29 NOTE — CONSULT NOTE ADULT - ATTENDING COMMENTS
COUNSELING:    Face to face meeting to discuss Advanced Care Planning - Time Spent ______ Minutes.  See goals of care note.    More than 50% time spent in counseling and coordinating care. _60_____ Minutes.     Thank you for the opportunity to assist with the care of this patient.   [facility] [phone].

## 2017-09-29 NOTE — PROGRESS NOTE ADULT - PROBLEM SELECTOR PLAN 5
On ct noted patient has Left renal upper lobe pole calculus with a cortical atrophy- Currently patient denies any urinary symptoms On ct noted patient has Left renal upper lobe pole calculus with a cortical atrophy- Currently patient denies any urinary symptoms. No intervention at this time

## 2017-09-29 NOTE — PROGRESS NOTE ADULT - SUBJECTIVE AND OBJECTIVE BOX
INTERVAL HPI/OVERNIGHT EVENTS: F/u after ercp for choledocholithiasis. Patient is non rebreather 100 % face mask. Her LFTs are trending down. No abdominal pain. Her wbc count had trended up yesterday. Afebrile.     MEDICATIONS  (STANDING):  ertapenem  IVPB      ertapenem  IVPB 1000 milliGRAM(s) IV Intermittent every 24 hours  levothyroxine 125 MICROGram(s) Oral daily  artificial  tears Solution 1 Drop(s) Both EYES two times a day  pantoprazole    Tablet 40 milliGRAM(s) Oral before breakfast  allopurinol 100 milliGRAM(s) Oral daily  saccharomyces boulardii 250 milliGRAM(s) Oral two times a day  metoprolol 50 milliGRAM(s) Oral two times a day  indomethacin Suppository 50 milliGRAM(s) Rectal once  furosemide   Injectable 20 milliGRAM(s) IV Push daily  heparin  Injectable 5000 Unit(s) SubCutaneous every 12 hours  glycopyrrolate Injectable 0.2 milliGRAM(s) IV Push every 8 hours    MEDICATIONS  (PRN):  morphine  - Injectable 1 milliGRAM(s) IV Push every 6 hours PRN Severe Pain (7 - 10)  ALBUTerol/ipratropium for Nebulization 3 milliLiter(s) Nebulizer every 2 hours PRN Wheezing      Allergies    penicillins (Unknown)    Intolerances        Vital Signs Last 24 Hrs  T(C): 36.6 (28 Sep 2017 23:46), Max: 36.6 (28 Sep 2017 23:46)  T(F): 97.9 (28 Sep 2017 23:46), Max: 97.9 (28 Sep 2017 23:46)  HR: 126 (28 Sep 2017 23:46) (97 - 126)  BP: 99/59 (28 Sep 2017 23:46) (99/59 - 140/70)  BP(mean): --  RR: 18 (28 Sep 2017 23:46) (18 - 20)  SpO2: 92% (28 Sep 2017 23:46) (82% - 94%)    LABS:                        10.4   23.3  )-----------( 375      ( 29 Sep 2017 07:14 )             33.1     09-29    147<H>  |  109<H>  |  37.0<H>  ----------------------------<  137<H>  5.1   |  24.0  |  1.52<H>    Ca    9.2      29 Sep 2017 07:14  Phos  6.3     09-29  Mg     2.3     09-29    TPro  6.8  /  Alb  3.2<L>  /  TBili  2.2<H>  /  DBili  1.4<H>  /  AST  17  /  ALT  15  /  AlkPhos  237<H>  09-29          RADIOLOGY & ADDITIONAL TESTS:  < from: Xray Chest 1 View AP/PA. (09.28.17 @ 12:00) >    IMPRESSION:   Constellation of findings suggestive of CHF..

## 2017-09-29 NOTE — PROGRESS NOTE ADULT - PROBLEM SELECTOR PLAN 2
EKG-  A-fib. Cardio consult has been placed. Patient has pacemaker in place. with fluctuating rhythm and now e/o fluid overload. Echo done in may 2017 is 55-60%. -lasix 20mg IVP stat and daily based on cxr  CHADsVAsc- 6.- points were given for Age, sex, history of HTN and CVA  has-bled- 4- points were given for HTN, age, prior history of bleeding and stroke. patient is high risk of bleeding  -Heparin given yesterday after ERCP for DVT prohylaxys -Moderate right effusion with right lower lobe atelectasis noted on CT. with e/o fluid overload on xray chest. She does not have a hx of heart failure, so will call this fluid overload 2/2 increased intake of IVF   -no intervention likely; will c/w lasix and likely d/c pending functionality

## 2017-09-29 NOTE — CHART NOTE - NSCHARTNOTEFT_GEN_A_CORE
Discussed case with family. Daughter and son and law at bedside. Family was informed that pt has been sob and uncomfortable.  family states that the patients wishes are for comfort care and not about prolonging life. Family made aware that there is a poor prognoses. Palliative carre is on board. Families' main concern is that the patient is no t in pain. The had not questions. Family has realistic expectations.

## 2017-09-29 NOTE — PROGRESS NOTE ADULT - SUBJECTIVE AND OBJECTIVE BOX
Patient is a 89y old  Female who presents with a chief complaint of Jaundice (25 Sep 2017 20:47)    Patient seen and examined at bedside. No acute distress and no acute overnight events. She states that her abdomen still bothers her intermittently, but she is now short of breath. She is hungry/thirsty. No pain at this time.    ROS: No chest pain, palpitations, sob, light headedness/dizziness, difficulty breathing/cough, fevers/chills,n/v, diarrhea/constipation, dysuria or increased urinary frequency.,    TELE: afib with some pvcs    Vital Signs Last 24 Hrs  T(C): 36.4 (28 Sep 2017 07:55), Max: 36.6 (28 Sep 2017 00:45)  T(F): 97.6 (28 Sep 2017 07:55), Max: 97.8 (28 Sep 2017 00:45)  HR: 101 (28 Sep 2017 07:55) (101 - 118)  BP: 143/90 (28 Sep 2017 07:55) (133/69 - 143/90)  RR: 18 (28 Sep 2017 07:55) (18 - 18)  SpO2: 95% (28 Sep 2017 07:55) (91% - 95%)    Physical exam:     General: Elderly lady laying bed side, in non acute distress. Resting tremor noted  Neurology: A&Ox3, nonfocal, CHILEL x 4  HEENT: sclera mildly icteric, less than yesterday  Abd: soft, non distended, NT and no guarding.  b/l all quad with BS +  Lymphatic:  No palpable cervical, supraclavicular, axillary or inguinal adenopathy  Respiratory: crackles noted bi-laterally base of the lungs and wheezing on Left upper lobe   Skin: jaundice noted. Stage 1 on sacral, eccymosis on left arm                   LIVER FUNCTIONS - ( 25 Sep 2017 15:17 )  Alb: 3.7 g/dL / Pro: 7.3 g/dL / ALK PHOS: 310 U/L / ALT: 21 U/L / AST: 31 U/L / GGT: x           PT/INR - ( 25 Sep 2017 15:17 )   PT: 14.4 sec;   INR: 1.30 ratio         PTT - ( 25 Sep 2017 15:17 )  PTT:33.6 sec      Urinalysis Basic - ( 25 Sep 2017 17:00 )    Color: Yellow / Appearance: Clear / S.005 / pH: x  Gluc: x / Ketone: Negative  / Bili: Negative / Urobili: 4 mg/dL   Blood: x / Protein: Negative mg/dL / Nitrite: Positive   Leuk Esterase: Trace / RBC: 0-2 /HPF / WBC 3-5   Sq Epi: x / Non Sq Epi: x / Bacteria: Few      < from: CT Abdomen and Pelvis w/ Oral Cont and w/ IV Cont (17 @ 17:58) >    IMPRESSION:   Extrahepatic biliary duct obstruction caused by distal common bile duct   stonemeasuring 2-3 mm.  Moderate right effusion with right lower lobe atelectasis. A  Left renal upper lobe pole calculus with a cortical atrophy   nonobstructing as described.   Chronic T11 vertebral fracture.    < end of copied text >    Blood cx negative to date         EXAM:  CHEST SINGLE VIEW FRONTAL                        PROCEDURE DATE:  2017    INTERPRETATION:  Portable chest radiograph      CLINICAL INFORMATION:   Short of breath.  TECHNIQUE:  Portable  AP view of the chest was obtained.  COMPARISON: 2017 available for review.  FINDINGS:   There is cardiomegaly, vascular congestion, perihilar interstitial   infiltrates and bilateral moderate pleural effusion. Constellation of   findings suggestive of CHF.  Cardiac device wire leads are within right atrium and right ventricle.   Trachea midline. No hilar mass.   Visualized osseous structures are intact.  IMPRESSION:   Constellation of findings suggestive of CHF..        OLEG ALEJANDRO M.D., ATTENDING RADIOLOGIST  This document has been electronically signed. Sep 28 2017 12:02PM            MEDICATIONS  (STANDING):  dextrose 5% + sodium chloride 0.9%. 1000 milliLiter(s) (75 mL/Hr) IV Continuous <Continuous>  ertapenem  IVPB      ertapenem  IVPB 1000 milliGRAM(s) IV Intermittent every 24 hours  levothyroxine 125 MICROGram(s) Oral daily  artificial  tears Solution 1 Drop(s) Both EYES two times a day  sertraline 50 milliGRAM(s) Oral daily  pantoprazole    Tablet 40 milliGRAM(s) Oral before breakfast  allopurinol 100 milliGRAM(s) Oral daily  saccharomyces boulardii 250 milliGRAM(s) Oral two times a day  metoprolol 100 milliGRAM(s) Oral daily    MEDICATIONS  (PRN): Patient is a 89y old  Female who presents with a chief complaint of Jaundice (25 Sep 2017 20:47)    Patient seen and examined at bedside. Pt was in respiratory distress since the ERCP yesterday, with signs of fluid overload. She was placed in O2 and furosemide was given. Overnight she continued in distress, but this morning her lungs sound with less crackles. On the monitor she was intermittently on A-fib/ sinus rhythm.     ROS: No chest pain, palpitations, light headedness/dizziness, difficulty breathing/cough, fevers/chills,n/v, diarrhea/constipation, dysuria or increased urinary frequency.,    TELE: intermittent afib sinus rhythm with some pvcs,     Vital Signs Last 24 Hrs  T(C): 36.4 (29 Sep 2017 08:05), Max: 36.6 (28 Sep 2017 23:46)  T(F): 97.6 (29 Sep 2017 08:05), Max: 97.9 (28 Sep 2017 23:46)  HR: 98 (29 Sep 2017 08:05) (97 - 126)  BP: 118/62 (29 Sep 2017 08:05) (99/59 - 140/70)  RR: 18 (29 Sep 2017 08:05) (18 - 20)  SpO2: 97% (29 Sep 2017 08:05) (82% - 97%)      I&O's Detail    28 Sep 2017 07:01  -  29 Sep 2017 07:00  --------------------------------------------------------  IN:  Total IN: 0 mL    OUT:    Voided: 375 mL  Total OUT: 375 mL    Total NET: -375 mL      Physical exam:     General: Elderly lady laying bed side, in moderate acute distress due to shortness of breath. Resting tremor noted  Neurology: oriented in person but not in time and place, nonfocal, CHILEL x 4  HEENT: sclera non icteric  Abd: soft, non distended, NT and no guarding.  b/l all quad with BS +  Respiratory: mild crackles noted bi-laterally base of the lungs, milder than yesterday  Skin: jaundice noted. Stage 1 on sacral, eccymosis on left arm                           10.4   23.3  )-----------( 375      ( 29 Sep 2017 07:14 )             33.1       09-29    147<H>  |  109<H>  |  37.0<H>  ----------------------------<  137<H>  5.1   |  24.0  |  1.52<H>    Ca    9.2      29 Sep 2017 07:14  Phos  6.3     09-29  Mg     2.3     09-29    TPro  6.8  /  Alb  3.2<L>  /  TBili  2.2<H>  /  DBili  1.4<H>  /  AST  17  /  ALT  15  /  AlkPhos  237<H>  09-29        LIVER FUNCTIONS - ( 25 Sep 2017 15:17 )  Alb: 3.7 g/dL / Pro: 7.3 g/dL / ALK PHOS: 310 U/L / ALT: 21 U/L / AST: 31 U/L / GGT: x           PT/INR - ( 25 Sep 2017 15:17 )   PT: 14.4 sec;   INR: 1.30 ratio         PTT - ( 25 Sep 2017 15:17 )  PTT:33.6 sec      Urinalysis Basic - ( 25 Sep 2017 17:00 )          < from: CT Abdomen and Pelvis w/ Oral Cont and w/ IV Cont (09.25.17 @ 17:58) >    IMPRESSION:   Extrahepatic biliary duct obstruction caused by distal common bile duct                         10.4   23.3  )-----------( 375      ( 29 Sep 2017 07:14 )             33.1                         10.4   23.3  )-----------( 375      ( 29 Sep 2017 07:14 )             33.1   stonemeasuring 2-3 mm.  Moderate right effusion with right lower lobe atelectasis. A  Left renal upper lobe pole calculus with a cortical atrophy   nonobstructing as described.   Chronic T11 vertebral fracture.    < end of copied text >    Blood cx negative to date         EXAM:  CHEST SINGLE VIEW FRONTAL                        PROCEDURE DATE:  09/28/2017    INTERPRETATION:  Portable chest radiograph      CLINICAL INFORMATION:   Short of breath.  TECHNIQUE:  Portable  AP view of the chest was obtained.  COMPARISON: 9/25/2017 available for review.  FINDINGS:   There is cardiomegaly, vascular congestion, perihilar interstitial   infiltrates and bilateral moderate pleural effusion. Constellation of   findings suggestive of CHF.  Cardiac device wire leads are within right atrium and right ventricle.   Trachea midline. No hilar mass.   Visualized osseous structures are intact.  IMPRESSION:   Constellation of findings suggestive of CHF..        OLEG ALEJANDRO M.D., ATTENDING RADIOLOGIST  This document has been electronically signed. Sep 28 2017 12:02PM            MEDICATIONS  (STANDING):  dextrose 5% + sodium chloride 0.9%. 1000 milliLiter(s) (75 mL/Hr) IV Continuous <Continuous>  ertapenem  IVPB      ertapenem  IVPB 1000 milliGRAM(s) IV Intermittent every 24 hours  levothyroxine 125 MICROGram(s) Oral daily  artificial  tears Solution 1 Drop(s) Both EYES two times a day  sertraline 50 milliGRAM(s) Oral daily  pantoprazole    Tablet 40 milliGRAM(s) Oral before breakfast  allopurinol 100 milliGRAM(s) Oral daily  saccharomyces boulardii 250 milliGRAM(s) Oral two times a day  metoprolol 100 milliGRAM(s) Oral daily    MEDICATIONS  (PRN): Patient is a 89y old  Female who presents with a chief complaint of Jaundice (25 Sep 2017 20:47)    Patient seen and examined at bedside. Pt was in respiratory distress since the ERCP  on 9/27, with signs of fluid overload. She was placed in O2 and furosemide was given. Overnight she continued to be in distress, but this morning her lungs sounded mildly improved and she was more alert and awake. On the monitor she was intermittently on A-fib/ sinus rhythm.     ROS: No chest pain, palpitations, light headedness/dizziness,  fevers/chills,n/v, diarrhea/constipation, dysuria or increased urinary frequency.,    TELE: intermittent afib sinus rhythm with some pvcs,     Vital Signs Last 24 Hrs  T(C): 36.4 (29 Sep 2017 08:05), Max: 36.6 (28 Sep 2017 23:46)  T(F): 97.6 (29 Sep 2017 08:05), Max: 97.9 (28 Sep 2017 23:46)  HR: 98 (29 Sep 2017 08:05) (97 - 126)  BP: 118/62 (29 Sep 2017 08:05) (99/59 - 140/70)  RR: 18 (29 Sep 2017 08:05) (18 - 20)  SpO2: 97% (29 Sep 2017 08:05) (82% - 97%)      i/O is negative 375      Physical exam:     General: Elderly lady laying bed side, in mild distress due to shortness of breath. Resting tremor noted. she has her nasal mask on for O2   Neurology: oriented in person but not in time and place, nonfocal  HEENT: sclera non icteric, right pupil 3 to 2mm, left pupil the same, however, right pupil has irregular borders  Abd: soft, non distended, NT and no guarding.  b/l all quad with BS +  Respiratory: mild crackles noted bi-laterally base of the lungs  Skin: jaundice noted. Stage 1 on sacral, eccymosis on left arm                           10.4   23.3  )-----------( 375      ( 29 Sep 2017 07:14 )             33.1       09-29    147<H>  |  109<H>  |  37.0<H>  ----------------------------<  137<H>  5.1   |  24.0  |  1.52<H>    Ca    9.2      29 Sep 2017 07:14  Phos  6.3     09-29  Mg     2.3     09-29    TPro  6.8  /  Alb  3.2<L>  /  TBili  2.2<H>  /  DBili  1.4<H>  /  AST  17  /  ALT  15  /  AlkPhos  237<H>  09-29        LIVER FUNCTIONS - ( 25 Sep 2017 15:17 )  Alb: 3.7 g/dL / Pro: 7.3 g/dL / ALK PHOS: 310 U/L / ALT: 21 U/L / AST: 31 U/L / GGT: x           PT/INR - ( 25 Sep 2017 15:17 )   PT: 14.4 sec;   INR: 1.30 ratio         PTT - ( 25 Sep 2017 15:17 )  PTT:33.6 sec      Urinalysis Basic - ( 25 Sep 2017 17:00 )          < from: CT Abdomen and Pelvis w/ Oral Cont and w/ IV Cont (09.25.17 @ 17:58) >    IMPRESSION:   Extrahepatic biliary duct obstruction caused by distal common bile duct                         10.4   23.3  )-----------( 375      ( 29 Sep 2017 07:14 )             33.1                         10.4   23.3  )-----------( 375      ( 29 Sep 2017 07:14 )             33.1   stonemeasuring 2-3 mm.  Moderate right effusion with right lower lobe atelectasis. A  Left renal upper lobe pole calculus with a cortical atrophy   nonobstructing as described.   Chronic T11 vertebral fracture.    < end of copied text >    Blood cx negative to date         EXAM:  CHEST SINGLE VIEW FRONTAL                        PROCEDURE DATE:  09/28/2017    INTERPRETATION:  Portable chest radiograph      CLINICAL INFORMATION:   Short of breath.  TECHNIQUE:  Portable  AP view of the chest was obtained.  COMPARISON: 9/25/2017 available for review.  FINDINGS:   There is cardiomegaly, vascular congestion, perihilar interstitial   infiltrates and bilateral moderate pleural effusion. Constellation of   findings suggestive of CHF.  Cardiac device wire leads are within right atrium and right ventricle.   Trachea midline. No hilar mass.   Visualized osseous structures are intact.  IMPRESSION:   Constellation of findings suggestive of CHF..        OLEG ALEJANDRO M.D., ATTENDING RADIOLOGIST  This document has been electronically signed. Sep 28 2017 12:02PM        MEDICATIONS  (STANDING):  artificial  tears Solution 1 Drop(s) Both EYES two times a day  furosemide   Injectable 20 milliGRAM(s) IV Push daily  heparin  Injectable 5000 Unit(s) SubCutaneous every 12 hours  glycopyrrolate Injectable 0.2 milliGRAM(s) IV Push every 8 hours  ALBUTerol/ipratropium for Nebulization 3 milliLiter(s) Nebulizer every 4 hours  ertapenem  IVPB 1000 milliGRAM(s) IV Intermittent every 24 hours    MEDICATIONS  (PRN):  LORazepam   Injectable 0.5 milliGRAM(s) IV Push every 6 hours PRN anxiety, restlessness  atropine 1% Ophthalmic Solution for SubLingual Use 2 Drop(s) SubLingual every 4 hours PRN secretions  morphine  - Injectable 2 milliGRAM(s) IV Push every 4 hours PRN tachypnea or sever pain of 7-10

## 2017-09-29 NOTE — CONSULT NOTE ADULT - PROBLEM SELECTOR RECOMMENDATION 9
On non-rebreather mask Saturating low (0%'s  Stat Duoneb treatment  and Q4 ATC  Morphine 2mg IV stat and Q4 prn   order increased to Morhine 100/100ml infusion at 2mg/hr

## 2017-09-29 NOTE — CONSULT NOTE ADULT - SUBJECTIVE AND OBJECTIVE BOX
HPI: This is s 89yo F awake alert in Respiratory Distress anxious and Tachypneic. When off venti mask O2 sat < to high 60's  on Non rebreather O2 mask RR > 24/min shallow. Wheezing R mid to lower lobe.  at 90 degrees in bed, no edema,  airway clear. Having difficulty swallowing-coughing this morning with anything by mouth  89 year old woman past medical history of , Afib(not on anticoagulation)  gout, depression, CVA hyperlipidemia, HTN, Parkinson, hypothyroidism presented to the ED with her daughter of complaints of yellowing of the skin. As per patient states this is the first time this has occurred. In the past the patient has had a bout of gallstone pancreatitis, during that admission the option of cholecystectomy was given and the patient refused it. She states the jaundice has been present for 4 days now. Did admit to having abdominal pain in the past couple of days, but currently does not have abdominal pain. Denies any recent travel nor sick contacts. Denies any pruritis  fever, nausea, vomiting, chest pain nor shortness of breathe, dysuria (25 Sep 2017 20:47)      PERTINENT PMH REVIEWED: Yes     PAST MEDICAL & SURGICAL HISTORY:  Afib  Gout  Parkinsons  Pacemaker  High cholesterol  CVA (cerebral infarction)  Hypertension  Adjustment and management of cardiac pacemaker      SOCIAL HISTORY:  EtOH Yes   No                                    Drugs  Yes  No                                    smoker  nonsmoker                                    Admitted from: home  SNF _________ [facility] ________Surrogate/HCP/Guardian: Phone#:    FAMILY HISTORY:  No pertinent family history in first degree relatives      Allergies    penicillins (Unknown)    Intolerances        Baseline ADLs (prior to admission):  Independent/ Dependent      Present Symptoms:     Dyspnea: 0 1 2 3   Nausea/Vomiting: Yes No  Anxiety:  Yes No  Depression: Yes No  Fatigue: Yes No  Loss of appetite: Yes No    Pain:             Character-            Duration-            Effect-            Factors-            Frequency-            Location-            Severity-    Review of Systems: Reviewed                     Negative:                     Positive:  Unable to obtain due to poor mentation   All others negative    MEDICATIONS  (STANDING):  ertapenem  IVPB      ertapenem  IVPB 1000 milliGRAM(s) IV Intermittent every 24 hours  artificial  tears Solution 1 Drop(s) Both EYES two times a day  indomethacin Suppository 50 milliGRAM(s) Rectal once  furosemide   Injectable 20 milliGRAM(s) IV Push daily  heparin  Injectable 5000 Unit(s) SubCutaneous every 12 hours  glycopyrrolate Injectable 0.2 milliGRAM(s) IV Push every 8 hours  morphine  - Injectable 2 milliGRAM(s) IV Push every 4 hours  ALBUTerol/ipratropium for Nebulization 3 milliLiter(s) Nebulizer every 4 hours    MEDICATIONS  (PRN):  LORazepam   Injectable 0.5 milliGRAM(s) IV Push every 6 hours PRN anxiety, restlessness  atropine 1% Ophthalmic Solution for SubLingual Use 2 Drop(s) SubLingual every 4 hours PRN secretions      PHYSICAL EXAM:    Vital Signs Last 24 Hrs  T(C): 36.4 (29 Sep 2017 08:05), Max: 36.6 (28 Sep 2017 23:46)  T(F): 97.6 (29 Sep 2017 08:05), Max: 97.9 (28 Sep 2017 23:46)  HR: 98 (29 Sep 2017 08:05) (97 - 126)  BP: 118/62 (29 Sep 2017 08:05) (99/59 - 140/70)  BP(mean): --  RR: 18 (29 Sep 2017 08:05) (18 - 20)  SpO2: 97% (29 Sep 2017 08:05) (82% - 97%)    General: alert  oriented x ____ lethargic agitated                  cachexia  nonverbal  coma    Karnofsky:  %    HEENT: normal  dry mouth  ET tube/trach    Lungs: comfortable tachypnea/labored breathing  excessive secretions    CV: normal  tachycardia    GI: normal  distended  tender  no BS               PEG/NG/OG tube  constipation  last BM:     : normal  incontinent  oliguria/anuria  ridley    MSK: normal  weakness  edema             ambulatory  bedbound/wheelchair bound    Skin: normal  pressure ulcers- Stage_____  no rash    LABS:                        10.4   23.3  )-----------( 375      ( 29 Sep 2017 07:14 )             33.1     09-29    147<H>  |  109<H>  |  37.0<H>  ----------------------------<  137<H>  5.1   |  24.0  |  1.52<H>    Ca    9.2      29 Sep 2017 07:14  Phos  6.3     09-29  Mg     2.3     09-29    TPro  6.8  /  Alb  3.2<L>  /  TBili  2.2<H>  /  DBili  1.4<H>  /  AST  17  /  ALT  15  /  AlkPhos  237<H>  09-29        I&O's Summary    28 Sep 2017 07:01  -  29 Sep 2017 07:00  --------------------------------------------------------  IN: 0 mL / OUT: 375 mL / NET: -375 mL        RADIOLOGY & ADDITIONAL STUDIES:    ADVANCE DIRECTIVES:   DNR YES NO  Completed on:                     MOLST  YES NO   Completed on:  Living Will  YES NO   Completed on:      COUNSELING:    Face to face meeting to discuss Advanced Care Planning - Time Spent ______ Minutes.  See goals of care note.    More than 50% time spent in counseling and coordinating care. ______ Minutes.     Thank you for the opportunity to assist with the care of this patient.   [facility] [phone]. HPI: This is s 89yo F awake alert in Respiratory Distress anxious and Tachypneic. When off venti mask O2 sat < to high 60's  on Non rebreather O2 mask RR > 24/min shallow. Wheezing R mid to lower lobe.  at 90 degrees in bed, no edema,  airway clear. Having difficulty swallowing-coughing this morning with anything by mouth  89 year old woman past medical history of , Afib(not on anticoagulation)  gout, depression, CVA hyperlipidemia, HTN, Parkinson, hypothyroidism presented to the ED with her daughter of complaints of yellowing of the skin. As per patient states this is the first time this has occurred. In the past the patient has had a bout of gallstone pancreatitis, during that admission the option of cholecystectomy was given and the patient refused it. She states the jaundice has been present for 4 days now. Did admit to having abdominal pain in the past couple of days, but currently does not have abdominal pain. Denies any recent travel nor sick contacts. Denies any pruritis  fever, nausea, vomiting, chest pain nor shortness of breathe, dysuria (25 Sep 2017 20:47)      PERTINENT PMH REVIEWED: Yes     PAST MEDICAL & SURGICAL HISTORY:  Afib  Gout  Parkinsons  Pacemaker  High cholesterol  CVA (cerebral infarction)  Hypertension  Adjustment and management of cardiac pacemaker      SOCIAL HISTORY:  EtOH    No                                    Drugs    No                                     nonsmoker                                    Admitted from: home  Daughter  Katiana Malone  FAMILY HISTORY:  No pertinent family history in first degree relatives    Allergies  penicillins (Unknown)      Baseline ADLs (prior to admission):  Independent/      Present Symptoms:     Dyspnea: 3   Nausea/Vomiting:  No  Anxiety:  Yes   Depression: No  Fatigue: Yes   Loss of appetite: Yes     Pain:  Denies  Nasal septum breaking down from O2 faCIAL MASK            Character-            Duration-            Effect-            Factors-            Frequency-            Location-            Severity-    Review of Systems: Reviewed                                        Unable to obtain due to poor mentation     MEDICATIONS  (STANDING):  ertapenem  IVPB      ertapenem  IVPB 1000 milliGRAM(s) IV Intermittent every 24 hours  artificial  tears Solution 1 Drop(s) Both EYES two times a day  indomethacin Suppository 50 milliGRAM(s) Rectal once  furosemide   Injectable 20 milliGRAM(s) IV Push daily  heparin  Injectable 5000 Unit(s) SubCutaneous every 12 hours  glycopyrrolate Injectable 0.2 milliGRAM(s) IV Push every 8 hours  morphine  - Injectable 2 milliGRAM(s) IV Push every 4 hours  ALBUTerol/ipratropium for Nebulization 3 milliLiter(s) Nebulizer every 4 hours    MEDICATIONS  (PRN):  LORazepam   Injectable 0.5 milliGRAM(s) IV Push every 6 hours PRN anxiety, restlessness  atropine 1% Ophthalmic Solution for SubLingual Use 2 Drop(s) SubLingual every 4 hours PRN secretions      PHYSICAL EXAM:    Vital Signs Last 24 Hrs  T(C): 36.4 (29 Sep 2017 08:05), Max: 36.6 (28 Sep 2017 23:46)  T(F): 97.6 (29 Sep 2017 08:05), Max: 97.9 (28 Sep 2017 23:46)  HR: 98 (29 Sep 2017 08:05) (97 - 126)  BP: 118/62 (29 Sep 2017 08:05) (99/59 - 140/70)  BP(mean): --  RR: 18 (29 Sep 2017 08:05) (18 - 20)  SpO2: 97% (29 Sep 2017 08:05) (82% - 97%)    General: alert   lethargic ANXIOUS                  cachexia  nonverbal  nodding her head    HEENT:   dry mouth      Lungs: tachypnea/labored breathing  excessive secretions    CV:  tachycardia    GI:   distended                constipation  last BM:     : normal  incontinent     MSK: weakness              bedbound/    Skin: n Stage_____  no rash    LABS:                        10.4   23.3  )-----------( 375      ( 29 Sep 2017 07:14 )             33.1     09-29    147<H>  |  109<H>  |  37.0<H>  ----------------------------<  137<H>  5.1   |  24.0  |  1.52<H>    Ca    9.2      29 Sep 2017 07:14  Phos  6.3     09-29  Mg     2.3     09-29    TPro  6.8  /  Alb  3.2<L>  /  TBili  2.2<H>  /  DBili  1.4<H>  /  AST  17  /  ALT  15  /  AlkPhos  237<H>  09-29        I&O's Summary    28 Sep 2017 07:01  -  29 Sep 2017 07:00  --------------------------------------------------------  IN: 0 mL / OUT: 375 mL / NET: -375 mL        RADIOLOGY & ADDITIONAL STUDIES:    ADVANCE DIRECTIVES:   DNR YES   Completed on:                     DAWSON NO   Completed on:  Living Will NO   Completed on:

## 2017-09-29 NOTE — CONSULT NOTE ADULT - CONSULT REASON
preop evaluation
Jaundice  Choledocholithiasis
Respiratory Failure, AFIB, CVA S/P ERCP  Resp. Distress

## 2017-09-29 NOTE — DISCHARGE NOTE ADULT - HOSPITAL COURSE
89 year old woman past medical history of , Afib(not on anticoagulation)  gout, depression, CVA hyperlipidemia, HTN, Parkinson, hypothyroidism presented to the ED with her daughter of complaints of yellowing of the skin. As per patient states this is the first time this has occurred. In the past the patient has had a bout of gallstone pancreatitis, during that admission the option of cholecystectomy was given and the patient refused it. She states the jaundice has been present for 5 days now. Did admit to having abdominal pain in the past couple of days, but currently does not have abdominal pain. Denies any recent travel nor sick contacts. Denies any pruritis  fever, nausea, vomiting, chest pain nor shortness of breathe, dysuria. Admitted for Jaundice 2/2 to Choledocholithiasis with an extrahepatic CBD obstructive stone. During the hospital course on day #3, GI did ERCP done on 09/27/2017 after cardiology clearance. Pt complained of SOB the day after the procedure, she was placed on O2 canula, 4L and on cardiac monitor, which showed A-fib, with some PVCs, occasional o2 desaturations. Chest X-ray was done: showed CHF signs. Pt placed on o2 already, IV fluids D/C ed, bed head raised and diuresis given on 9/28 via lasix IVP x 2 and to continue. On the morning of 9/29, she seemed to have periods of waxing and waning and was more lethargic. [facility] care saw patient, spoke to family (by primary team also) and likely patient is actively passing as she has e/o leucocytosis/no fevers, elevated cr and overall decline in her status 89 year old woman past medical history of , Afib(not on anticoagulation)  gout, depression, CVA hyperlipidemia, HTN, Parkinson, hypothyroidism presented to the ED with her daughter of complaints of yellowing of the skin. As per patient states this is the first time this has occurred. In the past the patient has had a bout of gallstone pancreatitis, during that admission the option of cholecystectomy was given and the patient refused it. She states the jaundice has been present for 5 days now. Did admit to having abdominal pain in the past couple of days, but currently does not have abdominal pain. Denies any recent travel nor sick contacts. Denies any pruritis  fever, nausea, vomiting, chest pain nor shortness of breathe, dysuria. Admitted for Jaundice 2/2 to Choledocholithiasis with an extrahepatic CBD obstructive stone. During the hospital course on day #3, GI did ERCP done on 09/27/2017 after cardiology clearance. Pt complained of SOB the day after the procedure, she was placed on O2 canula, 4L and on cardiac monitor, which showed A-fib, with some PVCs, occasional o2 desaturations. Chest X-ray was done: showed CHF signs. Pt placed on o2 already, IV fluids D/C ed, bed head raised and diuresis given on 9/28 via lasix IVP x 2 and to continue. On the morning of 9/29, she seemed to have periods of waxing and waning and was more lethargic. [facility] care saw patient, spoke to family (by primary team also) and likely patient is actively passing as she has e/o leucocytosis/no fevers, elevated cr and overall decline in her status. [facility] agreed to transfer the pt to hospice care.

## 2017-09-29 NOTE — PROGRESS NOTE ADULT - ASSESSMENT
Patient s/p ERCP with stone extraction , now in respiratory distress and with leukocytosis.    1. No GI complaints  2. ? CHF vs Hospital acquired pneumonia. May need to change antibiotic coverage  3. Monitor CBC

## 2017-09-29 NOTE — CONSULT NOTE ADULT - ASSESSMENT
89yo F  in Acute Respiratory Failure  Dyspnea Wheezing may be Aspirating  WBC elevated 23,000 not responding to antibotics      Plan: Will speak to Daughter about Hospice support.  Ativan 0.5mg IV STat  Morphine 2mg IV 2nd dose 45min after Ativan  Stat Duoneb treatment  NPO Aspiration precautions  DC'd all oral medications
Elderly woman with A fibrillation not on anticoagulation presented with cholelithiasis, choledocholithiasis and biliary colic presented with obstructive jaundice. She is on IV antibiotics at this time. She is appearing hemodynamically stable. Her morning labs are pending. She is aggreable for the ERCP procedure.     1. Continue antibiotics  2. Cardiology evaluation pending at this time  3. Will tentatively schedule for ERCP today depending upon the endoscopy lab logistics, otherwise will perform it tomorrow  4. Will rescind the DNR/DNI for the procedure  5. called her daughter Katiana Malone at [phone] and discussed the recommendations. Discussed ERCP procedure in detail including risks, benefits of the procedure  6. Maintain NPO at this time  7. Follow labs
89 yo woman with SSS, dual chamber PPM, recent generator replacement and persistent afib presents with common bile duct stone. She does not have structural or valvular heart disease.  PPM recently assessed and working normally. Not PPM dependent or candidate for chronic AC.  She is referred for ERCP. From a cardiac viewpoint she is in an elevated risk category for periprocedural complications just based on her advanced age.  However there is no absolute contraindication to the procedure.  Post procedure pt should be on cardiac monitor and pulse oximeter for 24 hours.  Aspirin should be resumed when OK with GI. She has chronic small atelectasis/pleural effusion on the right on CXR.  Post procedure pulmonary toilet is encouraged.  D/W pt and daughter

## 2017-09-29 NOTE — PROGRESS NOTE ADULT - PROBLEM SELECTOR PLAN 8
- THS 2.38,  - continue home medication  - levothyroxine 125 mg  once daily, adjusted d/c synthroid as she is not able to take oral medications anymore

## 2017-09-29 NOTE — GOALS OF CARE CONVERSATION - PERSONAL ADVANCE DIRECTIVE - TREATMENT GUIDELINE COMMENT
Treat dyspnea, restlessness pain  No DR FITZPATRICK AND MARY MADRIGAL DO  HOSPICE TO COME AND ASSESS PATIENT IF HOSPICE CAN SUPPORT    30 min meeting and Stat Respiratory treatment

## 2017-09-30 VITALS — RESPIRATION RATE: 18 BRPM

## 2017-09-30 PROBLEM — Z00.00 ENCOUNTER FOR PREVENTIVE HEALTH EXAMINATION: Status: ACTIVE | Noted: 2017-09-30

## 2017-09-30 PROCEDURE — 99239 HOSP IP/OBS DSCHRG MGMT >30: CPT

## 2017-09-30 RX ADMIN — Medication 0.25 MILLIGRAM(S): at 02:53

## 2017-09-30 NOTE — DISCHARGE NOTE FOR THE EXPIRED PATIENT - SECONDARY DIAGNOSIS.
Afib High cholesterol Chronic gout, unspecified cause, unspecified site Calculus of bile duct without cholecystitis with obstruction Anxiety associated with dying process Common bile duct (CBD) obstruction Essential hypertension

## 2017-09-30 NOTE — DISCHARGE NOTE FOR THE EXPIRED PATIENT - HOSPITAL COURSE
89 year old woman past medical history of, Afib (not on anticoagulation)  gout, depression, CVA hyperlipidemia, HTN, Parkinson, hypothyroidism presented to the ED with her daughter of complaints of yellowing of the skin. As per patient this was the first time this has occurred. In the past the patient has had a bout of gallstone pancreatitis, during that admission the option of cholecystectomy was given and the patient refused it. She stated the jaundice had been present for 5 days now, history of abdominal pain for the past days, but not present at the time of admission She denied any recent travel nor sick contacts. Denied any pruritis  fever, nausea, vomiting, chest pain nor shortness of breathe, dysuria. Admitted for Jaundice 2/2 to Choledocholithiasis with an extrahepatic CBD obstructive stone. During the hospital course on day #3, GI did ERCP on 09/27/2017 after cardiology clearance. Pt complained of SOB the day after the procedure, she was placed on O2 canula, 4L and on cardiac monitor, which showed A-fib, with some PVCs, occasional o2 desaturations. Chest X-ray was done: showed CHF signs. Pt placed on o2 already, IV fluids D/C ed, bed head raised and diuresis given on 9/28 via lasix IVP x 2 and to continue. On the morning of 9/29, she seemed to have periods of waxing and waning and was more lethargic. Palliative care saw patient, spoke to family (by primary team also) and likely patient was actively passing as she has e/o leucocytosis/no fevers, elevated cr and overall decline in her status despite being already on antibiotics (Invanz) since 09/26/2017. They agreed to transfer the pt to hospice care, the patient was waiting to be assessed by this service. and was DNR/DNI Order: Comfort measures only; No blood draws; No artificial nutrition. Family was informed at all time and agreed with the plan. Today we were informed by the nurse that the patient had not spontaneous respiration and did not response to painful stimulus. She was then assessed at bedside and during physical examination the pupils were fixed, no spontaneous  respiration was noted and no pulse was felt. The family was informed.

## 2017-10-01 LAB
CULTURE RESULTS: SIGNIFICANT CHANGE UP
CULTURE RESULTS: SIGNIFICANT CHANGE UP
SPECIMEN SOURCE: SIGNIFICANT CHANGE UP
SPECIMEN SOURCE: SIGNIFICANT CHANGE UP

## 2017-10-11 PROCEDURE — 94760 N-INVAS EAR/PLS OXIMETRY 1: CPT

## 2017-10-11 PROCEDURE — 84480 ASSAY TRIIODOTHYRONINE (T3): CPT

## 2017-10-11 PROCEDURE — 84100 ASSAY OF PHOSPHORUS: CPT

## 2017-10-11 PROCEDURE — 82330 ASSAY OF CALCIUM: CPT

## 2017-10-11 PROCEDURE — 84443 ASSAY THYROID STIM HORMONE: CPT

## 2017-10-11 PROCEDURE — 81001 URINALYSIS AUTO W/SCOPE: CPT

## 2017-10-11 PROCEDURE — 82803 BLOOD GASES ANY COMBINATION: CPT

## 2017-10-11 PROCEDURE — 71045 X-RAY EXAM CHEST 1 VIEW: CPT

## 2017-10-11 PROCEDURE — 84295 ASSAY OF SERUM SODIUM: CPT

## 2017-10-11 PROCEDURE — 82435 ASSAY OF BLOOD CHLORIDE: CPT

## 2017-10-11 PROCEDURE — 83690 ASSAY OF LIPASE: CPT

## 2017-10-11 PROCEDURE — 84132 ASSAY OF SERUM POTASSIUM: CPT

## 2017-10-11 PROCEDURE — 87086 URINE CULTURE/COLONY COUNT: CPT

## 2017-10-11 PROCEDURE — 80048 BASIC METABOLIC PNL TOTAL CA: CPT

## 2017-10-11 PROCEDURE — 85027 COMPLETE CBC AUTOMATED: CPT

## 2017-10-11 PROCEDURE — 85014 HEMATOCRIT: CPT

## 2017-10-11 PROCEDURE — 84484 ASSAY OF TROPONIN QUANT: CPT

## 2017-10-11 PROCEDURE — 87186 SC STD MICRODIL/AGAR DIL: CPT

## 2017-10-11 PROCEDURE — 70450 CT HEAD/BRAIN W/O DYE: CPT

## 2017-10-11 PROCEDURE — 76705 ECHO EXAM OF ABDOMEN: CPT

## 2017-10-11 PROCEDURE — 85610 PROTHROMBIN TIME: CPT

## 2017-10-11 PROCEDURE — 74177 CT ABD & PELVIS W/CONTRAST: CPT

## 2017-10-11 PROCEDURE — 99285 EMERGENCY DEPT VISIT HI MDM: CPT | Mod: 25

## 2017-10-11 PROCEDURE — 97163 PT EVAL HIGH COMPLEX 45 MIN: CPT

## 2017-10-11 PROCEDURE — 83735 ASSAY OF MAGNESIUM: CPT

## 2017-10-11 PROCEDURE — 87040 BLOOD CULTURE FOR BACTERIA: CPT

## 2017-10-11 PROCEDURE — 96374 THER/PROPH/DIAG INJ IV PUSH: CPT | Mod: XU

## 2017-10-11 PROCEDURE — 80076 HEPATIC FUNCTION PANEL: CPT

## 2017-10-11 PROCEDURE — 93005 ELECTROCARDIOGRAM TRACING: CPT

## 2017-10-11 PROCEDURE — 72125 CT NECK SPINE W/O DYE: CPT

## 2017-10-11 PROCEDURE — 85730 THROMBOPLASTIN TIME PARTIAL: CPT

## 2017-10-11 PROCEDURE — 82150 ASSAY OF AMYLASE: CPT

## 2017-10-11 PROCEDURE — 96375 TX/PRO/DX INJ NEW DRUG ADDON: CPT | Mod: XU

## 2017-10-11 PROCEDURE — 82947 ASSAY GLUCOSE BLOOD QUANT: CPT

## 2017-10-11 PROCEDURE — 83605 ASSAY OF LACTIC ACID: CPT

## 2017-10-11 PROCEDURE — 94640 AIRWAY INHALATION TREATMENT: CPT

## 2017-10-11 PROCEDURE — 84436 ASSAY OF TOTAL THYROXINE: CPT

## 2017-10-11 PROCEDURE — 82550 ASSAY OF CK (CPK): CPT

## 2017-10-11 PROCEDURE — 80053 COMPREHEN METABOLIC PANEL: CPT

## 2017-10-11 PROCEDURE — 36415 COLL VENOUS BLD VENIPUNCTURE: CPT

## 2017-10-11 PROCEDURE — 93306 TTE W/DOPPLER COMPLETE: CPT

## 2017-10-19 PROCEDURE — 94760 N-INVAS EAR/PLS OXIMETRY 1: CPT

## 2017-10-19 PROCEDURE — 80053 COMPREHEN METABOLIC PANEL: CPT

## 2017-10-19 PROCEDURE — 84443 ASSAY THYROID STIM HORMONE: CPT

## 2017-10-19 PROCEDURE — 94640 AIRWAY INHALATION TREATMENT: CPT

## 2017-10-19 PROCEDURE — 85730 THROMBOPLASTIN TIME PARTIAL: CPT

## 2017-10-19 PROCEDURE — 84145 PROCALCITONIN (PCT): CPT

## 2017-10-19 PROCEDURE — 83735 ASSAY OF MAGNESIUM: CPT

## 2017-10-19 PROCEDURE — 87040 BLOOD CULTURE FOR BACTERIA: CPT

## 2017-10-19 PROCEDURE — 74177 CT ABD & PELVIS W/CONTRAST: CPT

## 2017-10-19 PROCEDURE — C1769: CPT

## 2017-10-19 PROCEDURE — 36415 COLL VENOUS BLD VENIPUNCTURE: CPT

## 2017-10-19 PROCEDURE — 84100 ASSAY OF PHOSPHORUS: CPT

## 2017-10-19 PROCEDURE — 80307 DRUG TEST PRSMV CHEM ANLYZR: CPT

## 2017-10-19 PROCEDURE — 80048 BASIC METABOLIC PNL TOTAL CA: CPT

## 2017-10-19 PROCEDURE — 71045 X-RAY EXAM CHEST 1 VIEW: CPT

## 2017-10-19 PROCEDURE — 99285 EMERGENCY DEPT VISIT HI MDM: CPT | Mod: 25

## 2017-10-19 PROCEDURE — 85027 COMPLETE CBC AUTOMATED: CPT

## 2017-10-19 PROCEDURE — 80076 HEPATIC FUNCTION PANEL: CPT

## 2017-10-19 PROCEDURE — 93005 ELECTROCARDIOGRAM TRACING: CPT

## 2017-10-19 PROCEDURE — 85610 PROTHROMBIN TIME: CPT

## 2017-10-19 PROCEDURE — 83690 ASSAY OF LIPASE: CPT

## 2017-10-19 PROCEDURE — 76705 ECHO EXAM OF ABDOMEN: CPT

## 2017-10-19 PROCEDURE — C1889: CPT

## 2017-10-19 PROCEDURE — 74330 X-RAY BILE/PANC ENDOSCOPY: CPT

## 2017-10-19 PROCEDURE — 81001 URINALYSIS AUTO W/SCOPE: CPT

## 2018-11-08 NOTE — DISCHARGE NOTE ADULT - HAS THE PATIENT RECEIVED THE INFLUENZA VACCINE DURING THIS VISIT
Outpatient Pediatric Speech Therapy Daily Note    Date: 11/8/2018  Time In: 4:00 PM  Time Out: 4:45 PM    Patient Name: Soren Hines  MRN: [id number]  Therapy Diagnosis:   Encounter Diagnosis   Name Primary?    Speech/language delay       Physician: Kylee Berman MD   Medical Diagnosis: Developmental delay   Age: 6  y.o. 2  m.o.    Visit # 3 out of 40 authorization ending on 12/31/18  Date of Evaluation: 10/16/18  Plan of Care Expiration Date: 4/16/18   Extended POC: N/A    Precautions: Standard       Subjective:   Soren came to his 3rd speech therapy session with current clinician today accompanied by his great grandparents.  He participated in his 45 minute speech therapy session addressing his language skills with parent education following session.  He was alert, cooperative, and attentive to therapist and therapy tasks with moderate prompting required to stay on task. Soren was fairly easily redirected when he did become off task.     Pain: Soren was unable to rate pain on a numeric scale, but no pain behaviors were noted in today's session.  Objective:   UNTIMED  Procedure Min.   Speech- Language- Voice Therapy    45        Total Minutes: 45  Total Untimed Units: 1  Charges Billed/# of units: 1    The following language goals were targeted in today's session. Results revealed:  Short Term Objectives (3 mths):  Soren will:  1. Identify advanced body parts on self with 80% accuracy and minimal verbal cues over 3 consecutive therapy sessions.  11/8- 60% accuracy today  11/1- pt identified advanced body parts with 70% accuracy today  2. Make an inference while listening to a story read-aloud 5x with moderate verbal cues over 3 consecutive therapy sessions.  11/8- not targeted today  11/1- pt made an inference while listening to a story 4x  3. Follow 2-3 step directions without cueing with 80% accuracy over three consecutive therapy sessions.   11/8- 2-step directions with 60% accuracy  11/1- not targeted  "today  4. Maintain topic of conversation and demonstrate appropriate conversational turn-taking for 8 turns with visual cues 3x over three consecutive sessions.  11/8- mod cues for 6 turns today 3x  11/1- pt required moderate verbal cues to maintain a topic of conversation for 6 turns today 2x  5. Display appropriate stress patterns in short sentences (4-8 syllables) with model 5x over three consecutive sessions.   11/8- 4x with model today with 2 repetitions  11/1- not targeted today  6. Display appropriate rate of speech in short sentences (4-8 syllables) with model 5x over three consecutive session.  11/8- 3x today, pt needed cues to use appropriate volume as well  11/1- not targeted today  7. Generate rhyming words in response to a stimulus 5x over three consecutive sessions.   11/8- pt generated rhyming words 12x  11/1- pt generated rhyming words today 10x  8. Monitor volume of speech during structured conversation with verbal and/or visual cues 10x over three sessions. (GOAL ADDED 11/8)  11/8- 3x with verbal cues    Articulation goals: (GOALS ADDED 11/8)  1. Produce /l/ in the initial, medial, and final position of words at the word level with 90% accuracy given models across 3 consecutive sessions.   2. Produce /l/ blends in the initial position of words at the word level with 90% accuracy given models across 3 consecutive sessions.   3. Produce "j" in the initial, medial, and final position of words at the word level with 90% accuracy given models across 3 consecutive sessions.     The Colon-Fristoe Test of Articulation - 3 was administered to assess Emrys production of speech sounds in single words.  Testing revealed 16 errors with a Standard score of  83, ranking at the 13th percentile, and an age equivalent of 4 years, 4 months.  This score was in the below average range for Emrys chronological age level. Errors noted were:    Sounds-in-words Phonetic Error Analysis     Sound Initial Medial Final   p  "        b         t         d         k         kw         g         m         n         ng         f       v       Th (voiceless) /f/   /f/   Th (voiced) /d/ /v/     s      z      sh      ch      dg /d/      l /w/ /w/     r       w         j         h         bl bw       br bw      Dr        fr        gl gw       gr        kr        kw         Nt         pl pw       pr pw       sl sw       st        sw        sp        tr             Patient Education/Response:   Therapist discussed patient's goals and evaluation results with his great-grandparents. Different strategies were introduced to work on expanding Soren's language skills.  These strategies will help facilitate carry over of targeted goals outside of therapy sessions. They verbalized understanding of all discussed.    Written Home Exercises Provided: Patient instructed to cont prior HEP.  Strategies / Exercises were reviewed and Soren was able to demonstrate them prior to the end of the session.  Soren demonstrated good  understanding of the education provided.     See EMR under Patient Instructions for exercises provided prior visit.      Assessment:     Today was Soren's first speech therapy session.  Current goals remain appropriate.  Goals will be added and re-assessed as needed.      Pt prognosis is Good. Pt will continue to benefit from skilled outpatient speech and language therapy to address the deficits listed in the problem list on initial evaluation, provide pt/family education and to maximize pt's level of independence in the home and community environment.     Medical necessity is demonstrated by the following IMPAIRMENTS:  Developmental delay  Autism  Barriers to Therapy: None  Pt's spiritual, cultural and educational needs considered and pt agreeable to plan of care and goals.  Plan:     Continue speech therapy 1/wk for 45-60 minutes as planned. Continue implementation of a home program to facilitate carryover of targeted language  skills.    Ila Jacobsen M.A., CCC-SLP   No

## 2019-05-08 NOTE — PROGRESS NOTE ADULT - SUBJECTIVE AND OBJECTIVE BOX
INTERVAL HPI/OVERNIGHT EVENTS/SUBJECTIVE:  Pt admitted with gallstone pancreatitis.  Given total 7L total IVF during the day.  MAP's soft and no longer fluid responsive.  SVO2 64.   Increasing pressor requirements, Vaso started.  Solumedrol given.  Able to ½ Levo dose, UOP improved.      ICU Vital Signs Last 24 Hrs  T(C): 36.1, Max: 37.3 (05-20 @ 20:00)  T(F): 97, Max: 99.1 (05-20 @ 20:00)  HR: 68 (65 - 73)  BP: 118/58 (62/37 - 132/60)  BP(mean): 83 (44 - 86)  ABP: 113/31 (90/37 - 123/37)  ABP(mean): 63 (56 - 70)  RR: 24 (18 - 43)  SpO2: 94% (91% - 97%)      I&O's Detail    I & Os for current day (as of 21 May 2017 06:16)  =============================================  IN:    multiple electrolytes Injection Type 1: 2700 ml    0.9% NaCl: 750 ml    norepinephrine Infusion: 136.6 ml    IV PiggyBack: 100 ml    Solution: 100 ml    Solution: 50 ml    vasopressin Infusion: 7.2 ml    Total IN: 3843.8 ml  ---------------------------------------------  OUT:    Indwelling Catheter - Urethral: 622 ml    Total OUT: 622 ml  ---------------------------------------------  Total NET: 3221.8 ml        ABG - ( 21 May 2017 05:46 )  pH: 7.31  /  pCO2: 47    /  pO2: 73    / HCO3: 22    / Base Excess: -2.6  /  SaO2: 93                  MEDICATIONS  (STANDING):  multiple electrolytes Injection Type 1 1000milliLiter(s) IV Continuous <Continuous>  heparin  Injectable 5000Unit(s) SubCutaneous every 8 hours  norepinephrine Infusion 0.05MICROgram(s)/kG/Min IV Continuous <Continuous>  ertapenem  IVPB  IV Intermittent   ertapenem  IVPB 500milliGRAM(s) IV Intermittent every 24 hours  vasopressin Infusion 0.04Unit(s)/Min IV Continuous <Continuous>  methylPREDNISolone sodium succinate Injectable 50milliGRAM(s) IV Push every 6 hours    MEDICATIONS  (PRN):  HYDROmorphone  Injectable 0.5milliGRAM(s) IV Push every 4 hours PRN Severe Pain (7 - 10)      NUTRITION/IVF: NPO/ Plasmalyte @ 100cc/hr    CENTRAL LINE:  RT SC TLC     MOONEY:   YES    A-LINE:   Lt axillary    PHYSICAL EXAM:    Gen:  Sleepy, arouses to voice.  NAD    Eyes:  EOMI's BL    Neurological:  A&O x3, [place]    ENMT:  Dry mouth    Neck:  Supple    Pulmonary:  CTAB, paradoxical breathing noted when asleep.  Slight increased WOB noted    Cardiovascular:  NSR    Gastrointestinal:  Soft, NT/ND    Genitourinary:  Mooney    Extremities:  No pedal edema BL    Skin:  Pink, warm dry    Musculoskeletal:  AFROM x4          LABS:  CBC Full  -  ( 21 May 2017 02:16 )  WBC Count : 46.7 K/uL  Hemoglobin : 9.4 g/dL  Hematocrit : 27.5 %  Platelet Count - Automated : 310 K/uL  Mean Cell Volume : 97.5 fl  Mean Cell Hemoglobin : 33.3 pg  Mean Cell Hemoglobin Concentration : 34.2 g/dL  Auto Neutrophil # : x  Auto Lymphocyte # : x  Auto Monocyte # : x  Auto Eosinophil # : x  Auto Basophil # : x  Auto Neutrophil % : 89.0 %  Auto Lymphocyte % : x  Auto Monocyte % : 3.0 %  Auto Eosinophil % : 1.0 %  Auto Basophil % : x    05-    140  |  98  |  35.0<H>  ----------------------------<  128<H>  3.7   |  21.0<L>  |  1.30    Ca    7.1<L>      21 May 2017 02:16  Phos  2.4     05-20  Mg     3.6     05-21    TPro  5.4<L>  /  Alb  2.9<L>  /  TBili  3.7<H>  /  DBili  3.3<H>  /  AST  110<H>  /  ALT  36<H>  /  AlkPhos  144<H>      PT/INR - ( 20 May 2017 19:35 )   PT: 15.6 sec;   INR: 1.41 ratio         PTT - ( 20 May 2017 19:35 )  PTT:35.5 sec  Urinalysis Basic - ( 20 May 2017 11:12 )    Color: Annie / Appearance: Slightly Turbid / S.005 / pH: x  Gluc: x / Ketone: Negative  / Bili: Negative / Urobili: 12 mg/dL   Blood: x / Protein: 30 mg/dL / Nitrite: Negative   Leuk Esterase: Small / RBC: x / WBC 11-25   Sq Epi: x / Non Sq Epi: Occasional / Bacteria: TNTC      RECENT CULTURES:   .Blood Blood XXXX XXXX   Growth in aerobic and anaerobic bottles: Gram Negative Rods  Aerobic Bottle: 10 Hours to positivity  Anaerobic Bottle: 9:34 Hours to positivity  .    TYPE: (C=Critical, N=Notification, A=Abnormal) C  TESTS:  _ Gram stain  DATE/TIME CALLED: _  .Blood Blood XXXX XXXX   Growth in aerobic and anaerobic bottles: Gram Negative Rods  Aerobic Bottle: 9:34 Hours to positivity  Anaerobic Bottle: 9:44 Hours to positivity  .  TYPE: (C=Critical, N=Notification, A=Abnormal) C  TESTS:  _ Gram stain  DATE/TIME CALLED: _         LIVER FUNCTIONS - ( 20 May 2017 16:01 )  Alb: 2.9 g/dL / Pro: 5.4 g/dL / ALK PHOS: 144 U/L / ALT: 36 U/L / AST: 110 U/L / GGT: x           CARDIAC MARKERS ( 20 May 2017 07:30 )  x     / 0.03 ng/mL / 27 U/L / x     / x          CAPILLARY BLOOD GLUCOSE  131 (21 May 2017 01:00)  64 (21 May 2017 00:00)  72 (20 May 2017 16:00)      RADIOLOGY & ADDITIONAL STUDIES:    ASSESSMENT/PLAN:  89yFemale presenting with:  Acute gallstone pancreatitis, Septic shock, BHUMI    Neuro:  Dilaudid for pain, monitor for delerium    CV:  Levophed gtt, SVO2 62, SVV 12, CI 3.1.  Cleared LA.  Vasopressin off most of night.  ECHO ordered, pending    Pulm:  Low threshold to intubate.  Increased pulm effusion BL.  On 3L NC    GI/Nutrition:  NPO/Plasmalyte @ 100cc/hr.  US RUQ showed distended gallbladder with pericholecystic fluid.  Dr Garcia spoke with IR, no biliary duct dilation, no procedure warranted at this time.  Pt needs ERCP however GI unwilling to perform at this time 2/2 to pt unstable    /Renal:  Mooney, strict I/O's    ID:  Invanz ()  Leukocytosis worsening.  BCX x4 bottles + Gm neg rods    Lines/Tubes:  Mooney, Rt SC TLC, Lt ax juaquin    Endo:  Started on Solumedrol for increasing pressor requirement, monitor FS    Skin:  Intact    Proph:  Lovenox    Dispo:  Continue to monitor endpoints.  F/U UCx.  Rediscuss w/ GI need for ERCP      CRITICAL CARE TIME SPENT: none

## 2020-01-01 NOTE — CONSULT NOTE ADULT - SUBJECTIVE AND OBJECTIVE BOX
Patient is a 89y old  Female who presents with a chief complaint of Abdominal pain (20 May 2017 11:47)    HPI:  Pt presents to [facility] ER s/p fall at home. She went out in her back yard and fell and pushed a life alert and daughter came and found her sitting up on grass and pt c/o abdominal pain pt says she cant remember when it started whether it was before or after she fellShe was unable to get up and utilized her life alert. Daughter came to the patient's aid and brought her to [facility]. She admits to a history of abdominal pain but cannot recall when it began. Found to have transaminitis and elevated lipase so thought to have pancreatitis, related to gallstones and cholangitis. Required pressors and fluid resuscitation, started on IV antibiotics. Transferred from ICU to floors on 5/20. No surgical intervention per family and patient's request. Seen by palliative care. Found to have E. coli bacteremia - antibiotics adjusted. Hospital course complicated with acute delirium, respiratory difficulty secondary to fluid overload requiring Lasix, and epistaxis.    REVIEW OF SYSTEMS  Constitutional - No fever, No weight loss, No fatigue  HEENT - No eye pain, No visual disturbances, No difficulty hearing, No tinnitus, No vertigo, No neck pain  Respiratory - No cough, No wheezing, No shortness of breath  Cardiovascular - No chest pain, No palpitations  Gastrointestinal - No abdominal pain, No nausea, No vomiting, No diarrhea, No constipation  Genitourinary - No dysuria, No frequency, No hematuria, No incontinence  Neurological - No headaches, No memory loss, No loss of strength, No numbness, No tremors  Skin - No itching, No rashes, No lesions   Endocrine - No temperature intolerance  Musculoskeletal - No joint pain, No joint swelling, No muscle pain  Psychiatric - No depression, No anxiety    PAST MEDICAL & SURGICAL HISTORY  s/p Pacemaker  Chronic pancreatitis  High cholesterol  CVA (cerebral infarction)  Parkinson's  Hypertension    SOCIAL HISTORY  Smoking - Denied  EtOH - Denied   Drugs - Denied    FUNCTIONAL HISTORY  Lives with daughter in a split level house with 2 NICOLLE, 5 STI with b/L HR  Independent    CURRENT FUNCTIONAL STATUS  Bed mobility - min A/mod A  Transfer - mod A  Gait -  1 step with RW requiring max A    FAMILY HISTORY   Reviewed and non-contributory    ALLERGIES  penicillins (Unknown)    VITALS  T(C): 36.7  T(F): 98, Max: 98.7 (05-30 @ 20:55)  HR: 76 (70 - 80)  BP: 128/62 (112/58 - 144/67)  RR:  (18 - 20)  SpO2:  (96% - 98%)  Wt(kg): --    PHYSICAL EXAM  Constitutional - NAD, Comfortable  HEENT - NCAT, EOMI  Neck - Supple, No limited ROM  Chest - CTA bilaterally, No wheeze, No rhonchi, No crackles  Cardiovascular - RRR, S1S2, No murmurs  Abdomen - BS+, Soft, NTND  Extremities - No C/C/E, No calf tenderness   Neurologic Exam -                    Cognitive - Awake, Alert, AAO to self, place, date, year, situation     Communication - Fluent, No dysarthria     Cranial Nerves - CN 2-12 intact     Motor - No focal deficits                    LEFT    UE - ShAB 5/5, EF 5/5, EE 5/5, WE 5/5,  5/5                    RIGHT UE - ShAB 5/5, EF 5/5, EE 5/5, WE 5/5,  5/5                    LEFT    LE - HF 5/5, KE 5/5, DF 5/5, PF 5/5                    RIGHT LE - HF 5/5, KE 5/5, DF 5/5, PF 5/5        Sensory - Intact to LT     Reflexes - DTR Intact, No primitive reflexive     Coordination - FTN intact     OculoVestibular - No saccades, No nystagmus, VOR         Balance - WNL Static  Psychiatric - Mood stable, Affect WNL    RECENT LABS/IMAGING             11.6   19.2  )-----------( 508      ( 31 May 2017 07:24 )             35.0     140  |  101  |  32.0<H>  ----------------------------<  106    ( 31 May 2017 )  3.7   |  27.0  |  0.78    Ca    9.0      31 May 2017 07:24  Phos  3.0     05-30  Mg     2.4     05-31    MEDICATIONS   MEDICATIONS  (STANDING):  heparin  Injectable 5000Unit(s) SubCutaneous every 8 hours  ertapenem  IVPB  IV Intermittent   ertapenem  IVPB 500milliGRAM(s) IV Intermittent every 24 hours  metoprolol 50milliGRAM(s) Oral two times a day  allopurinol 100milliGRAM(s) Oral daily  sertraline 50milliGRAM(s) Oral daily  ALBUTerol/ipratropium for Nebulization 3milliLiter(s) Nebulizer every 6 hours  ALBUTerol    90 MICROgram(s) HFA Inhaler 1Puff(s) Inhalation every 4 hours  furosemide    Tablet 20milliGRAM(s) Oral every other day  levothyroxine 125MICROGram(s) Oral daily  haloperidol    Injectable 2.5milliGRAM(s) IntraMuscular once  acetaminophen  IVPB. 500milliGRAM(s) IV Intermittent once  potassium chloride  10 mEq/100 mL IVPB 10milliEquivalent(s) IV Intermittent once    MEDICATIONS  (PRN):  ALBUTerol/ipratropium for Nebulization 3milliLiter(s) Nebulizer every 4 hours PRN additional wheezing  oxyCODONE IR 2.5milliGRAM(s) Oral four times a day PRN Moderate Pain (4 - 6)    ASSESSMENT/PLAN  89 year old female with abdominal pain found to have acute on chronic pancreatitis, thought to be related to gallstones and cholangitis. Course complicated with E. coli bacteremia. Now with functional, gait, and ADL impairment.    - Recommend ACUTE inpatient rehabilitation for the functional deficits consisting of 3 hours of therapy/day & 24 hour RN/daily PMR physician for comorbid medical management. Will continue to follow for ongoing rehab needs and recommendations.  - Recommend AZIZA, patient DOES NOT meet acute inpatient rehabilitation criteria  - Recommend DC HOME with outpatient  - Recommend DC HOME with homecare  - Follow up with [facility] - Call [phone] for an appointment Patient is a 89y old  Female who presents with a chief complaint of Abdominal pain (20 May 2017 11:47)    HPI:  Pt presents to [facility] ER s/p fall at home. She went out in her back yard and fell and pushed a life alert and daughter came and found her sitting up on grass and pt c/o abdominal pain pt says she cant remember when it started whether it was before or after she fellShe was unable to get up and utilized her life alert. Daughter came to the patient's aid and brought her to [facility]. She admits to a history of abdominal pain but cannot recall when it began. Found to have transaminitis and elevated lipase so thought to have pancreatitis, related to gallstones and cholangitis. Required pressors and fluid resuscitation, started on IV antibiotics. Transferred from ICU to floors on 5/20. No surgical intervention per family and patient's request. Seen by palliative care. Found to have E. coli bacteremia - antibiotics adjusted. Hospital course complicated with acute delirium, respiratory difficulty secondary to fluid overload requiring Lasix, and epistaxis.    REVIEW OF SYSTEMS  Constitutional - No fever, No fatigue  HEENT - No visual disturbances, No difficulty hearing, No tinnitus, No vertigo, No neck pain  Respiratory - No cough, No wheezing, +Intermittent shortness of breath  Cardiovascular - No chest pain, No palpitations  Gastrointestinal - No abdominal pain, No nausea, No vomiting, No diarrhea, No constipation, +Poor appetite  Genitourinary - No dysuria, No frequency, No hematuria, No incontinence  Neurological - No headaches, No memory loss, No loss of strength, No numbness, No tremors  Skin - No itching, No rashes, No lesions   Endocrine - No temperature intolerance  Musculoskeletal - No joint pain, No joint swelling, No muscle pain, +Tremors  Psychiatric - No depression, No anxiety    PAST MEDICAL & SURGICAL HISTORY  H/O CHF s/p Pacemaker  Chronic pancreatitis  High cholesterol  CVA (cerebral infarction) after cardiac procedure, unsure of residual deficits  Parkinson's  Hypertension    SOCIAL HISTORY  Smoking - Denied  EtOH - Denied   Drugs - Denied    FUNCTIONAL HISTORY  Lives with daughter in a split level house with 3 NICOLLE, 5 STI with b/L HR  Utilized RW for assistance with ambulation most of the time, independent with ADL's and transfers    CURRENT FUNCTIONAL STATUS  Bed mobility - min A/mod A  Transfer - mod A  Gait -  1 step with RW requiring max A    FAMILY HISTORY   Reviewed and non-contributory    ALLERGIES  penicillins (Unknown)    VITALS  T(C): 36.7  T(F): 98, Max: 98.7 (05-30 @ 20:55)  HR: 76 (70 - 80)  BP: 128/62 (112/58 - 144/67)  RR:  (18 - 20)  SpO2:  (96% - 98%)  Wt(kg): --    PHYSICAL EXAM  Constitutional - NAD, Comfortable  HEENT - NCAT, EOMI  Neck - Supple, No limited ROM  Chest - CTA bilaterally, No wheeze, No rhonchi, No crackles  Cardiovascular - RRR, S1S2, No murmurs  Abdomen - BS+, Soft, NTND  Extremities - No C/C/E, No calf tenderness  Neurologic Exam -                    Cognitive - Awake, Alert, AAO to self, hospital (not exact place), month, year, situation     Communication - Fluent, No dysarthria     Cranial Nerves - CN 2-12 intact     Attention - Easily distractible     Memory - Requires cueing to recall 3/3 items after 5 minutes     Motor - +Resting tremors, right > left extremities                    LEFT    UE - ShAB 5/5, EF 5/5, EE 5/5, WE 5/5,  5/5                    RIGHT UE - ShAB 5/5, EF 5/5, EE 5/5, WE 5/5,  5/5                    LEFT    LE - HF 5/5, KE 5/5, DF 4/5, PF 5/5                    RIGHT LE - HF 5/5, KE 5/5, DF 4/5, PF 5/5        Sensory - Intact to LT     Reflexes - DTR Intact     Coordination - FTN impaired on right > left upper extremities  Psychiatric - Mood stable, Affect WNL    RECENT LABS/IMAGING             11.6   19.2  )-----------( 508      ( 31 May 2017 07:24 )             35.0     140  |  101  |  32.0<H>  ----------------------------<  106    ( 31 May 2017 )  3.7   |  27.0  |  0.78    Ca    9.0      31 May 2017 07:24  Phos  3.0     05-30  Mg     2.4     05-31    MEDICATIONS   MEDICATIONS  (STANDING):  heparin  Injectable 5000Unit(s) SubCutaneous every 8 hours  ertapenem  IVPB  IV Intermittent   ertapenem  IVPB 500milliGRAM(s) IV Intermittent every 24 hours  metoprolol 50milliGRAM(s) Oral two times a day  allopurinol 100milliGRAM(s) Oral daily  sertraline 50milliGRAM(s) Oral daily  ALBUTerol/ipratropium for Nebulization 3milliLiter(s) Nebulizer every 6 hours  ALBUTerol    90 MICROgram(s) HFA Inhaler 1Puff(s) Inhalation every 4 hours  furosemide    Tablet 20milliGRAM(s) Oral every other day  levothyroxine 125MICROGram(s) Oral daily  haloperidol    Injectable 2.5milliGRAM(s) IntraMuscular once  acetaminophen  IVPB. 500milliGRAM(s) IV Intermittent once  potassium chloride  10 mEq/100 mL IVPB 10milliEquivalent(s) IV Intermittent once    MEDICATIONS  (PRN):  ALBUTerol/ipratropium for Nebulization 3milliLiter(s) Nebulizer every 4 hours PRN additional wheezing  oxyCODONE IR 2.5milliGRAM(s) Oral four times a day PRN Moderate Pain (4 - 6)    ASSESSMENT/PLAN  89 year old female with abdominal pain found to have acute on chronic pancreatitis, thought to be related to gallstones and cholangitis. Course complicated with E. coli bacteremia. Now with functional, gait, and ADL impairment.    - Recommend ACUTE inpatient rehabilitation for the functional deficits consisting of 3 hours of therapy/day & 24 hour RN/daily PMR physician for comorbid medical management. Will continue to follow for ongoing rehab needs and recommendations.  - Avoid Haldol for mood/impulsivity, use conservative measures of re-direction, providing calm/quiet/low stimuli environment.  - Recommend discontinuing oxycodone for pain relief, recommend using Tylenol PRN. Patient is a 89y old  Female who presents with a chief complaint of Abdominal pain (20 May 2017 11:47)    HPI:  Pt presents to [facility] ER s/p fall at home. She went out in her back yard and fell and pushed a life alert and daughter came and found her sitting up on grass and pt c/o abdominal pain pt says she cant remember when it started whether it was before or after she fellShe was unable to get up and utilized her life alert. Daughter came to the patient's aid and brought her to [facility]. She admits to a history of abdominal pain but cannot recall when it began. Found to have transaminitis and elevated lipase so thought to have pancreatitis, related to gallstones and cholangitis. Required pressors and fluid resuscitation, started on IV antibiotics. Transferred from ICU to floors on 5/20. No surgical intervention per family and patient's request. Seen by palliative care. Found to have E. coli bacteremia - antibiotics adjusted. Hospital course complicated with acute delirium, respiratory difficulty secondary to fluid overload requiring Lasix, and epistaxis.    REVIEW OF SYSTEMS  Constitutional - No fever, No fatigue  HEENT - No visual disturbances, No difficulty hearing, No tinnitus, No vertigo, No neck pain  Respiratory - No cough, No wheezing, +Intermittent shortness of breath  Cardiovascular - No chest pain, No palpitations  Gastrointestinal - No abdominal pain, No nausea, No vomiting, No diarrhea, No constipation, +Poor appetite  Genitourinary - No dysuria, No frequency, No hematuria, No incontinence  Neurological - No headaches, No memory loss, No loss of strength, No numbness, No tremors  Skin - No itching, No rashes, No lesions   Endocrine - No temperature intolerance  Musculoskeletal - No joint pain, No joint swelling, No muscle pain, +Tremors  Psychiatric - No depression, No anxiety    PAST MEDICAL & SURGICAL HISTORY  H/O CHF s/p Pacemaker  Chronic pancreatitis  High cholesterol  CVA (cerebral infarction) after cardiac procedure, unsure of residual deficits  Parkinson's  Hypertension    SOCIAL HISTORY  Smoking - Denied  EtOH - Denied   Drugs - Denied    FUNCTIONAL HISTORY  Lives with daughter in a split level house with 3 NICOLLE, 5 STI with b/L HR  Utilized RW for assistance with ambulation most of the time, independent with ADL's and transfers    CURRENT FUNCTIONAL STATUS  Bed mobility - min A/mod A  Transfer - mod A  Gait -  1 step with RW requiring max A    FAMILY HISTORY   Reviewed and non-contributory    ALLERGIES  penicillins (Unknown)    VITALS  T(C): 36.7  T(F): 98, Max: 98.7 (05-30 @ 20:55)  HR: 76 (70 - 80)  BP: 128/62 (112/58 - 144/67)  RR:  (18 - 20)  SpO2:  (96% - 98%)  Wt(kg): --    PHYSICAL EXAM  Constitutional - NAD, Comfortable  HEENT - NCAT, EOMI  Neck - Supple, No limited ROM  Chest - CTA bilaterally, No wheeze, No rhonchi, No crackles  Cardiovascular - RRR, S1S2, No murmurs  Abdomen - BS+, Soft, NTND  Extremities - No C/C/E, No calf tenderness  Neurologic Exam -                    Cognitive - Awake, Alert, AAO to self, hospital (not exact place), month, year, situation     Communication - Fluent, No dysarthria     Cranial Nerves - CN 2-12 intact     Attention - Easily distractible     Memory - Requires cueing to recall 3/3 items after 5 minutes     Motor - +Resting tremors, right > left extremities                    LEFT    UE - ShAB 5/5, EF 5/5, EE 5/5, WE 5/5,  5/5                    RIGHT UE - ShAB 5/5, EF 5/5, EE 5/5, WE 5/5,  5/5                    LEFT    LE - HF 5/5, KE 5/5, DF 4/5, PF 5/5                    RIGHT LE - HF 5/5, KE 5/5, DF 4/5, PF 5/5        Sensory - Intact to LT     Reflexes - DTR Intact     Coordination - FTN impaired on right > left upper extremities  Psychiatric - Mood stable, Affect WNL    RECENT LABS/IMAGING             11.6   19.2  )-----------( 508      ( 31 May 2017 07:24 )             35.0     140  |  101  |  32.0<H>  ----------------------------<  106    ( 31 May 2017 )  3.7   |  27.0  |  0.78    Ca    9.0      31 May 2017 07:24  Phos  3.0     05-30  Mg     2.4     05-31    MEDICATIONS   MEDICATIONS  (STANDING):  heparin  Injectable 5000Unit(s) SubCutaneous every 8 hours  ertapenem  IVPB  IV Intermittent   ertapenem  IVPB 500milliGRAM(s) IV Intermittent every 24 hours  metoprolol 50milliGRAM(s) Oral two times a day  allopurinol 100milliGRAM(s) Oral daily  sertraline 50milliGRAM(s) Oral daily  ALBUTerol/ipratropium for Nebulization 3milliLiter(s) Nebulizer every 6 hours  ALBUTerol    90 MICROgram(s) HFA Inhaler 1Puff(s) Inhalation every 4 hours  furosemide    Tablet 20milliGRAM(s) Oral every other day  levothyroxine 125MICROGram(s) Oral daily  haloperidol    Injectable 2.5milliGRAM(s) IntraMuscular once  acetaminophen  IVPB. 500milliGRAM(s) IV Intermittent once  potassium chloride  10 mEq/100 mL IVPB 10milliEquivalent(s) IV Intermittent once    MEDICATIONS  (PRN):  ALBUTerol/ipratropium for Nebulization 3milliLiter(s) Nebulizer every 4 hours PRN additional wheezing  oxyCODONE IR 2.5milliGRAM(s) Oral four times a day PRN Moderate Pain (4 - 6)    ASSESSMENT/PLAN  89 year old female with abdominal pain found to have acute on chronic pancreatitis, thought to be related to gallstones and cholangitis. Course complicated with E. coli bacteremia. Now with functional, gait, and ADL impairment.    - Recommend AZIZA, patient DOES NOT meet acute inpatient rehabilitation criteria  - Avoid Haldol for mood/impulsivity, use conservative measures of re-direction, providing calm/quiet/low stimuli environment.  - Recommend discontinuing oxycodone for pain relief, recommend using Tylenol PRN. developmental considerations

## 2020-10-20 NOTE — ED PROVIDER NOTE - PROGRESS NOTE DETAILS
Pt placed in sling. She has difficulty abducting at the shoulder. Xray with arthritis at AC joint but an occult fracture cant be excluded. Spoke to family and pt about this. She will f/u with orthopedics. see below

## 2021-03-22 NOTE — PATIENT PROFILE ADULT. - PATIENT REPRESENTATIVE: ( YOU CAN CHOOSE ANY PERSON THAT CAN ASSIST YOU WITH YOUR HEALTH CARE PREFERENCES, DOES NOT HAVE TO BE A SPOUSE, IMMEDIATE FAMILY OR SIGNIFICANT OTHER/PARTNER)
[facility]
                                    [address] [place], [place]  [address]
_________________________________________________________________________________________
                                                                 Signed   
 
 
 
SPECIMEN(S): A CECUM COLON BIOPSY
SPECIMEN(S): B HEPATIC FLEXURE COLON BIOPSY
SPECIMEN(S): C DESCENDING COLON BIOPSY
SPECIMEN(S): D SIGMOID COLON BIOPSY
SPECIMEN(S): E RECTOSIGMOID BIOPSY
SPECIMEN(S): F RECTUM
SPECIMEN(S): G RECTAL POLYP
 
SPECIMEN SOURCE:
A. CECUM COLON BIOPSY
B. HEPATIC FLEXURE COLON BIOPSY
C. DESCENDING COLON BIOPSY
D. SIGMOID COLON BIOPSY
E. RECTOSIGMOID BIOPSY
F. RECTUM
G. RECTAL POLYP
 
CLINICAL HISTORY:
Pre: Screening, history of diarrhea.  Post: Colon inflammation and rectal 
polyp. 
MICROSCOPIC DESCRIPTION:
Histologic sections of all submitted blocks are examined by light microscopy. 
These findings, together with the gross examination, support the pathologic 
diagnosis. 
 
FINAL PATHOLOGIC DIAGNOSIS:
A.  Cecum, biopsy:
-  Colonic mucosa with no significant pathologic changes.
B.  Colon, hepatic flexure, biopsy:
-  Colonic mucosa with no significant pathologic changes.
C.  Colon, descending, biopsy:
-  Colonic mucosa with no significant pathologic changes.
D.  Colon, sigmoid, biopsy:
-  Colonic mucosa with no significant pathologic changes.
E.  Colon, rectosigmoid, biopsy:
-  Colonic mucosa with no significant pathologic changes.
F.  Rectum, biopsy:
-  Colonic mucosa with no significant pathologic changes.
G.  Rectum, polypectomy:
-  Hyperplastic polyp.
BRP:bg:C2NR
 
                                                                                    
_________________________________________________________________________________________
PATIENT NAME:     EDMUND RIVERO                    
MEDICAL RECORD #: [id number]            PATHOLOGY                     
          ACCT #: [id number]       ACCESSION #: [id number]     
DATE OF BIRTH:   [birth year]            REPORT #: [id number]       
PHYSICIAN:        MONSERRAT STAHL              
PCP:              ROBBY JACKSON PA-C         
REPORT IS CONFIDENTIAL AND NOT TO BE RELEASED WITHOUT AUTHORIZATION
 
 
                                  [facility]
                                    [address], [place]  [address]
_________________________________________________________________________________________
                                                                 Signed   
 
 
 
GROSS DESCRIPTION:
Seven specimens are received in seven containers, labeled "TI."
A. The specimen, labeled "TI, cecum biopsy," is received in formalin and 
consists of two tan soft tissue fragment(s) that measure 0.1-0.2 cm in greatest 
dimension. The specimen is entirely submitted 
in cassette (A1).
B.  The specimen, labeled "TI, hepatic flexure biopsy," is received in formalin 
and consists of three tan soft tissue fragment(s) that measure 0.1-0.2 cm in 
greatest dimension. The specimen is 
entirely submitted in cassette (B1).
C.  The specimen, labeled "TI, descending colon biopsy," is received in 
formalin and consists of four tan soft tissue fragment(s) that measure 0.1-0.3 
cm in greatest dimension. The specimen is 
entirely submitted in cassette (C1).
D.  The specimen, labeled "TI, sigmoid colon biopsy," is received in formalin 
and consists of two tan soft tissue fragment(s) that measure 0.3 cm in greatest 
dimension. The specimen is entirely 
submitted in cassette (D1).
 
E.  The specimen, labeled "TI, rectosigmoid colon biopsy," is received in 
formalin and consists of one tan soft tissue fragment that measures 0.3 cm in 
greatest dimension. The specimen is entirely 
submitted in cassette (E1).
F.   The specimen, labeled "TI, rectum biopsy," is received in formalin and 
consists of one tan soft tissue fragment that measures 0.3 cm in greatest 
dimension. The specimen is entirely submitted in 
cassette (F1).
G.   The specimen, labeled "TI, rectal polyp," is received in formalin and 
consists of four tan soft tissue fragment(s) that measure 0.2-0.4 cm in 
greatest dimension. The specimen is entirely 
submitted in cassette (G1).
JS (under the direct supervision of a pathologist)
The Gross Description was prepared using a voice recognition system. The report 
was reviewed for accuracy; however, sound-alike word errors, addition and/or 
deletions may occur. If there is any 
question about this report, please contact Client Services.
 
PERFORMING LABORATORY:
The technical component was performed by [facility] [place] [address] (Medical Director: Ramona Patel MD; CLIA# [id number]). 
Professional interpretation was performed by 
 
                                                                                    
_________________________________________________________________________________________
PATIENT NAME:     EDMUND RIVERO                    
MEDICAL RECORD #: [id number]            PATHOLOGY                     
          ACCT #: [id number]       ACCESSION #: [id number]     
DATE OF BIRTH:   [birth year]            REPORT #: [id number]       
PHYSICIAN:        MONSERRAT STAHL              
PCP:              ROBBY JACKSON PA-C         
REPORT IS CONFIDENTIAL AND NOT TO BE RELEASED WITHOUT AUTHORIZATION
 
 
                                  [facility]
                                    [address] [place] [address]  [address]
_________________________________________________________________________________________
                                                                 Signed   
 
 
[facility] [address] (Medical Director: Ramona Patel MD; CLIA# [id number]). 
 
Diagnostician:  Brenden Griffith MD
Pathologist
Electronically Signed 03/22/2021
 
 
Copies:                                
~
 
 
 
 
 
 
 
 
 
 
 
 
 
 
 
 
 
 
 
 
 
 
 
 
 
 
 
 
 
 
 
 
 
                                                                                    
_________________________________________________________________________________________
PATIENT NAME:     EDMUND RIVERO                    
MEDICAL RECORD #: [id number]            PATHOLOGY                     
          ACCT #: [id number]       ACCESSION #: [id number]     
DATE OF BIRTH:   [birth year]            REPORT #: [id number]       
PHYSICIAN:        MONSERRAT PATHOLOGY              
PCP:              ROBBY JACKSON PA-C         
REPORT IS CONFIDENTIAL AND NOT TO BE RELEASED WITHOUT AUTHORIZATION
Yes

## 2021-07-22 NOTE — ED ADULT NURSE NOTE - PMH
RX PROGRESS NOTE: Vancomycin Therapeutic Drug Monitoring    Day of therapy: 4    Indication and target trough: Pneumonia (10-15 mcg/mL)    Current vancomycin dosing regimen: 1250 mg IVPB every 12 hours    Most recent height and weight information:  Weight: 99 kg (07/22/21 0245)  Height: 5' 4\" (162.6 cm) (07/19/21 1512)    The Following are the Calculated  Current Weights for Amira Grigsby            Adjusted Ideal    72.4 kg 54.7 kg          Labs:  Serum Creatinine and Creatinine Clearance:  Serum creatinine: 1.33 mg/dL (H) 07/22/21 0643  Estimated creatinine clearance: 46.9 mL/min (A)    Vancomycin Serum Concentrations:  Vancomycin, Trough (mcg/mL)   Date/Time Value   07/22/2021 0643 26.4 (HH)       Assessment:  Serum concentration of 26.4 mcg/mL prior to the 6th dose.   Based on the serum concentration, will adjust regimen to vancomycin 1250 mg IVPB every 24 hours.    Additional serum concentrations may be necessary depending on pathogen identified, risk factors for adverse events, and/or duration of therapy.  Pharmacy will continue to follow and adjust as needed.    Thank you,    Evelia Gabriel [facility]  7/22/2021 8:03 AM  Contact # [phone]     CVA (cerebral infarction)    High cholesterol    Hypertension    Pacemaker

## 2022-03-28 NOTE — GOALS OF CARE CONVERSATION - PERSONAL ADVANCE DIRECTIVE - PHONE #
[phone] Fluconazole Pregnancy And Lactation Text: This medication is Pregnancy Category C and it isn't know if it is safe during pregnancy. It is also excreted in breast milk.

## 2022-05-11 NOTE — ED ADULT NURSE NOTE - NS ED NOTE  TALK SOMEONE YN
Spoke with Alec gómez and advised the [facility] paperwork is ready to be picked up. Alec Giron requested the forms to be emailed to her at [email or web address]. These have been emailed and sent to the scanning hub. No

## 2022-07-20 NOTE — ED ADULT NURSE NOTE - NS PRO AD NO ADVANCE DIRECTIVE
[TextBox_4] : Shortness of breath is much better now.  Has seen Dr. Ja Tam for cardiology follow-up for CHF exacerbation, he is currently on Lasix.
No

## 2022-11-15 NOTE — PATIENT PROFILE ADULT. - FUNCTIONAL SCREEN CURRENT LEVEL: TOILETING, MLM
Outpatient Physical Therapy  [address] [facility], [place] [address]                            Physical Therapy Daily Treatment Note    Patient: Ramón Mauro   : 1966  Diagnosis/ICD-10 Code:  Status post total replacement of right hip [Z96.641]  Referring practitioner: KRISHNA Salazar  Date of Initial Visit: Type: THERAPY  Noted: 10/31/2022  Today's Date: 11/15/2022  Patient seen for 5 sessions             Subjective   Ramón Mauro reports: hip doing better and better, states that he isn't taking anything for pain. Reports being able to put on his right sock this morning and got in and out of his car without wincing in pain. States that he has been able to sleep on his right side for the past week.    Objective   No complaints of increased pain or discomfort with increased weight.    See Exercise, Manual, and Modality Logs for complete treatment.     Assessment/Plan  Ramón progressing as evident by decreased overall hip pain. Pt tolerated exercises well, no complaints of increased pain or discomfort. Pt would benefit from skilled PT to address Range of Motion  and Strength deficits, pain management and any concerns with ADLs.         Progress per Plan of Care         Timed:  Manual Therapy:         mins  83402;  Therapeutic Exercise:    14     mins  82313;     Neuromuscular Sourav:    8   mins  20891;    Therapeutic Activity:     8     mins  83090;     Gait Training:           mins  30986;    Aquatic Therapy:          mins  92805;       Untimed:  Electrical Stimulation:         mins  04883 ( );  Mechanical Traction:         mins  77098;       Timed Treatment:   30   mins   Total Treatment:     30   mins      Electronically signed:   Yasmeen Glynn PTA  Physical Therapist Assistant  [facility] License #: [id number]   (3) assistive equipment and person

## 2022-12-19 NOTE — PROGRESS NOTE ADULT - PROBLEM/PLAN-3
DISPLAY PLAN FREE TEXT
Oral Minoxidil Counseling- I discussed with the patient the risks of oral minoxidil including but not limited to shortness of breath, swelling of the feet or ankles, dizziness, lightheadedness, unwanted hair growth and allergic reaction.  The patient verbalized understanding of the proper use and possible adverse effects of oral minoxidil.  All of the patient's questions and concerns were addressed.

## 2023-01-23 NOTE — DISCHARGE NOTE ADULT - PATIENT PORTAL LINK FT
“You can access the FollowHealth Patient Portal, offered by [facility], by registering with the following website: [email or web address]”
08:30

## 2024-06-17 NOTE — PATIENT PROFILE ADULT. - ANESTHESIA, PREVIOUS REACTION, PROFILE
Called pt to review neg MATT21 results with assistance of international services  reviewed with pt .   none

## 2024-09-14 NOTE — ED PROVIDER NOTE - NS ED NOTE AC HIGH RISK COUNTRIES
[facility]  MEDICAL ICU PROGRESS NOTE      Patient Name: Kaykay Guevara    : 1938  MRN: [id number]  Admit Date: 2024   Length of Stay: 5 Days    Interval/Overnight Events   : Patient drowsy, still with LUE weakness but able to deny pain at this time. Spoke with daughter and discussed overnight events and TTE results. Family still does not want to pursue repeating CTA for further CVA workup. Home hospice consulted yesterday, will follow up to ensure they speak with family today.   : Patient drowsy but denies complaints this morning, reporting she feels tired. Initiated on SNB yesterday but only made approximately 625cc urine over 24h. This morning ESMER is worsening, renal US unremarkable. POCUS still showing plethoric IVC with less than 50% respirophasic variation, monophasic PV, RIJ remains dilated. Remains on lasix gtt, likely needs further diuresis today. Nephrology consulted this morning.   Addendum: RN called ICU team to the room as she noted LUE weaker than RUE. Pt wakes up and answers orientation questions , following commands/withdrawing to pain on LUE. STAT CT head pending, CTA pending prep for contrast. Neurology notified and will assess at bedside.   : Patient remains off of vasopressors, normotensive. XAVIER done yesterday () without evidence of vegetation, TAVR in stable position, mild MR and RD, no LA clot. Patient without complaints this morning with the exception of asking to go home and \"sleep in my own bed.\" POCUS with dilated IVC at 2.21cm with blunted respirophasic variation, +portal vein pulsatility and worsening ESMER, likely from congestion. Only 350cc UOP overnight. Plan to diurese with goal NN-2L today. Remains tachycardic with mild tachypnea.    : Weaned off levo overnight, BP remains stable. Required straight cath x1 after dose of IV lasix, urine culture came back positive for E. Faecium. Bedside POCUS still showing dilated IVC with minimal respirophasic  variation, monophasic PV, no B-lines. This morning patient reports being very tired and says she does not want surgery and \"wants to go home\". Discussed with patient at bedside and and daughter over the phone- she is agreeable to XAVIER this morning but will further discuss plan and goals of care this afternoon. Labs showing worsening ESMER, hyponatremia and hypochloremia, Hgb 7.1- will repeat  9/10: Patient mental status improved overnight, remains A&O x2, conversational. Remains on low dose Levo, will wean as able. Bedside POCUS appears to have moderately reduced LV function, IVC ~1.8 with minimal respirophasic variation, no B-lines noted bilaterally. Continue antibiotics per ID. Troponin still elevated, denies pain this morning, continue to trend to peak.     Zhen Guevara is a 86 year old female with a past medical history of hypertension, diabetes, CAD, aortic stenosis s/p TAVR 4/24, atrial fibrillation on Coumadin, CVA s/p thrombectomy 7/16 this year, recent inguinal wound secondary to pseudoaneurysm and abscess from recent right femoral thrombectomy, recent known Pseudomonas bacteremia treated outpatient with Zosyn end date 8/26/24 admitted to MICU on 9/9 for hypotension, encephalopathy.  Patient initially presented to the hospital from local nursing rehab facility last evening for altered mental status and hypotension.  Initially in the ED, patient was found to be hypotensive and in A-fib RVR.  Patient was resuscitated with crystalloids given antiarrhythmics and rate slowing medications for which her heart rate improved.  CT head was negative for any acute intracranial abnormality.  Patient's mental status also improved therefore, was admitted to the floor for further workup admit management.  Overnight, rapid response was called for again A-fib RVR and hypotension however, improved with magnesium sulfate, amiodarone bolus, and p.o. metoprolol.    This morning, another rapid response was called due to  change in mental status.  Patient was also found to be hypotensive with systolic blood pressure in the 60s.  Per bedside report, patient was in her normal mental state this a.m. eating breakfast and nursing starting amiodarone infusion which patient subsequently became more encephalopathic.  BP was checked and was found to be hypertensive as stated above.  Rapid response was called and patient was initially given a 250 cc bolus of crystalloid.  Shortly thereafter, norepinephrine was started.  ICU was called to evaluate.  Upon arrival, patient was on 10 mcg of norepinephrine however, remained nearly obtunded.  Patient does awake and move all extremities to noxious stimuli however, is nonverbal.  No overt focal deficit.  Pupils equal and reactive.  Patient with warm extremities however, delayed cap refill.  Lactic acid normal.  Patient's BP normalized on norepinephrine.  Due to patient's norepinephrine to ED, will transfer to the ICU for further workup and management.  Will obtain additional CT head noncontrast on the way down to the ICU.    Physical Exam  General: NAD, A&O x 2. Lethargic,   Neuro: GCS 14. PERRL. Following commands, weakly on LUE, minimal effort against gravity.   HEENT: Normocephalic  Neck: trachea midline, atraumatic; elevated JVP  Lungs: Coarse breath sounds bilaterally  Cardio: Irregularly, irregular, tachycardic.  Abdomen: BS present, NT, ND.   Skin: Warm. No rashes.  Right groin surgical incision- dress in place with no drainage noted.   Extremities:  2+ radial/pedal pulses equal bilaterally      Assessment and Plan:  86-year-old female with PMH as above admitted to the medical ICU on 9/9 for hypotension, altered mental status    Neuro/Psych  Acute toxic encephalopathy- improved, multifactorial in the setting of bacteremia, UTI, and hypotension.  CT head neg acute intracranial abnormality, CTA without occlusion or significant stenosis   Optimize MAP, infectious workup and treatment as below    Supportive care  EEG negative  Repeat CTH 9/13 negative, family did not want to proceed with scheduled CTA   Neurology on consult, appreciate recs  Recent acute CVA, M1 occlusion s/p aspiration thrombectomy on 7/16 at outside hospital- likely septic cardioembolic infarct secondary to Pseudomonas bacteremia --> did have negative blood cultures after the initiation of 6 weeks of Zosyn, completed on 8/26/24.  Supportive care  Sepsis treatment as below  Pulm  Possible COPD - no PFTs in chart  Continue DuoNeb as needed  Acute on chronic hypoxic respiratory failure, secondary to bilateral lower lobe opacities, concern for consolidation vs. atelectasis  IS q2h, OOB  Antibiotics as below   Supplemental O2 as needed, wean to maintain oxygen greater than 92%   CV  Hypotension - resolved: multifactorial in the setting of vasodilatory and cardiogenic  Optimize fluid status based on POCUS  MAP goal greater than 65  ID on consult, antibiotics as below   AC for PE as below  Cardiology, ID on consult   HFrEF, 9/9: TTE showing LVEF 42%, bioprosthetic aortic valve with mildly thickened leaflets, mod TV regurg, RV systolic pressure moderately increased, 9/13- LVEF 50-55%  Optimize fluid status based on bedside POCUS  Monitor I/O, daily weights  Left main pulmonary artery PE  Continue warfarin  Cardiology consult, appreciate recs  Atrial fibrillation   Continue metoprolol  Continue PTA warfarin  Cardiology on consult, appreciate recs  NSTEMI type II, suspect demand   Troponin trending down  Cardiology on consult, appreciate recs  HTN, history   Continue with metoprolol  Right femoral pseudoaneurysm, S/p right external iliac to SFA bypass on 7/17 complicated by right groin wound dehiscence/abscess - s/p incisional debridement 8/22/2024  RLE duplex 9/11 - no evidence of pseudoaneurysm, AVF or hematoma  General surgery consult, appreciate recs  Vascular surgery on consult, appreciate recs   History of aortic stenosis s/p TAVR  3/2024  Prosthetic valve endocarditis post TAVR; XAVIER on 7/22/2024 with an independently mobile echodensity on the aortic valve  XAVIER without vegetations, no LA clot, mild TR/MR  Continue linezolid, zosyn    Cardiology, ID following  GI  GERD, history of  PPI  Renal/Metabolic  ESMER, suspect in the setting of hypoperfusion and hypervolemia  Continue to trend daily   Renal US 9/13 unremarkable  Optimize MAP, fluid status - status post diuresis   Patient family did not   Hourly I/O's   Continue bladder scan q4h   Nephrology consult, appreciate recs   Heme/Onc  Anemia chronic disease  No overt bleeding noted  Trend daily indices  Transfuse for hgb <7 or hemodynamic instability  Infectious Disease  Pseudomonas bacteremia  E. Facecium UTI   Continue linezolid x 5d, Zosyn x 14d per ID   Blood cultures positive for Pseudomonas aeruginosa 9/8   Follow up repeat blood cultures, NGTD   Urine culture + E. Facealis with MDRO, but vanc susceptible   MRSA negative  ID consult, appreciate recs   XAVIER as above   Integument/MSK/Miscellaneous  No other active ICU issues at this time    Sedation/analgesia: None  DVT prophylaxis: Heparin gtt -> bridge to coumadin, SCDs  Electrolytes: unless specified above replace as needed  Lines/drains/tubes: PIV  Bowel Regimen: colace, senna as needed  Ulcer prophylaxis: PPI  Nutrition: Cardiac  Glucose control: Glucose checks as needed  Therapies: PT/OT/ST when appropriate  Code Status: Do Not Resuscitate    I have spent greater than 45 min performing face-to-face patient care, including rendering the following critical care: Reviewed all vitals, medications, new orders, I/O, labs, micro, radiology, nurses notes, pertinent consultant notes which are reflected in assessment and plan. Patient is critically ill as documented above and requires high complexity medical decision making and active titration of therapies to preserve life.     Lynnette Hamilton, [facility]   Critical Care   No

## 2024-09-19 NOTE — PATIENT PROFILE ADULT. - NS TRANSFER PATIENT BELONGINGS
Other belongings Quality 130: Documentation Of Current Medications In The Medical Record: Current Medications Documented Quality 431: Preventive Care And Screening: Unhealthy Alcohol Use - Screening: Patient not identified as an unhealthy alcohol user when screened for unhealthy alcohol use using a systematic screening method Quality 226: Preventive Care And Screening: Tobacco Use: Screening And Cessation Intervention: Patient screened for tobacco use and is an ex/non-smoker Detail Level: Detailed

## 2024-10-11 NOTE — ED ADULT NURSE REASSESSMENT NOTE - REASSESS COMMUNICATION
ED physician notified/MD Penaloza

## 2024-10-23 NOTE — ED PROVIDER NOTE - PROGRESS NOTE DETAILS
[FreeTextEntry1] : see gyn  obtain pelvic sono rule out fibroids ekg ok refused flu vaccine follow up prn Dr. Bravo returned call and states that he will see the patient in the am.

## 2024-11-13 NOTE — ED ADULT NURSE NOTE - NS ED NURSE REPORT GIVEN DT
VSS. Discharge instructions reviewed with patient, patient verbalizes understanding. No complaints of pain or discomfort.  PIV removed without difficulty and catheter intact. Patient discharged home safely to family via wheelchair.  
25-Sep-2017 22:31

## 2025-01-13 NOTE — INPATIENT CERTIFICATION FOR MEDICARE PATIENTS - RISKS OF ADVERSE EVENTS
Pt able to tolerate ice chips with no nausea or vomiting.   Pt reports no more pain at this time.    Concern for delay in diagnosis and treatment

## 2025-03-03 NOTE — ED ADULT NURSE NOTE - NS ED NURSE RECORD ANOTHER HT AND WT
If a colonoscopy was performed you might notice a few drops of blood on your underwear or you might see blood on the toilet paper after you use the bathroom. This is caused by irritation to the bowel during the procedure and is not a problem. However if you have any more heavy bleeding (more than 2 tablespoons of bright red blood) contact your doctor right away. Yes